# Patient Record
Sex: FEMALE | Race: WHITE | NOT HISPANIC OR LATINO | Employment: FULL TIME | URBAN - METROPOLITAN AREA
[De-identification: names, ages, dates, MRNs, and addresses within clinical notes are randomized per-mention and may not be internally consistent; named-entity substitution may affect disease eponyms.]

---

## 2023-10-03 ENCOUNTER — LAB (OUTPATIENT)
Dept: LAB | Facility: LAB | Age: 38
End: 2023-10-03
Payer: COMMERCIAL

## 2023-10-03 DIAGNOSIS — R63.5 ABNORMAL WEIGHT GAIN: Primary | ICD-10-CM

## 2023-10-03 LAB — TSH SERPL-ACNC: 0.63 MIU/L (ref 0.44–3.98)

## 2023-10-03 PROCEDURE — 36415 COLL VENOUS BLD VENIPUNCTURE: CPT

## 2023-10-12 ENCOUNTER — APPOINTMENT (OUTPATIENT)
Dept: PRIMARY CARE | Facility: CLINIC | Age: 38
End: 2023-10-12
Payer: COMMERCIAL

## 2023-10-19 ENCOUNTER — LAB REQUISITION (OUTPATIENT)
Dept: LAB | Facility: HOSPITAL | Age: 38
End: 2023-10-19
Payer: COMMERCIAL

## 2023-10-19 DIAGNOSIS — N89.8 OTHER SPECIFIED NONINFLAMMATORY DISORDERS OF VAGINA: ICD-10-CM

## 2023-10-19 DIAGNOSIS — R10.2 PELVIC AND PERINEAL PAIN: ICD-10-CM

## 2023-10-19 DIAGNOSIS — N93.0 POSTCOITAL AND CONTACT BLEEDING: ICD-10-CM

## 2023-10-19 PROCEDURE — 88305 TISSUE EXAM BY PATHOLOGIST: CPT | Performed by: PATHOLOGY

## 2023-10-19 PROCEDURE — 88305 TISSUE EXAM BY PATHOLOGIST: CPT

## 2023-10-20 LAB
LABORATORY COMMENT REPORT: NORMAL
PATH REPORT.FINAL DX SPEC: NORMAL
PATH REPORT.GROSS SPEC: NORMAL
PATH REPORT.RELEVANT HX SPEC: NORMAL
PATH REPORT.TOTAL CANCER: NORMAL

## 2024-03-01 ENCOUNTER — OFFICE VISIT (OUTPATIENT)
Dept: PRIMARY CARE | Facility: CLINIC | Age: 39
End: 2024-03-01
Payer: COMMERCIAL

## 2024-03-01 DIAGNOSIS — R41.3 MEMORY CHANGES: ICD-10-CM

## 2024-03-01 DIAGNOSIS — F90.2 ATTENTION DEFICIT HYPERACTIVITY DISORDER (ADHD), COMBINED TYPE: ICD-10-CM

## 2024-03-01 DIAGNOSIS — R53.83 FATIGUE, UNSPECIFIED TYPE: ICD-10-CM

## 2024-03-01 DIAGNOSIS — M25.59 PAIN IN OTHER JOINT: ICD-10-CM

## 2024-03-01 DIAGNOSIS — J30.1 NON-SEASONAL ALLERGIC RHINITIS DUE TO POLLEN: Primary | ICD-10-CM

## 2024-03-01 DIAGNOSIS — E03.9 ACQUIRED HYPOTHYROIDISM: ICD-10-CM

## 2024-03-01 DIAGNOSIS — M25.50 ARTHRALGIA, UNSPECIFIED JOINT: ICD-10-CM

## 2024-03-01 LAB
CRP SERPL-MCNC: 5.1 MG/DL (ref 0–2)
ERYTHROCYTE [SEDIMENTATION RATE] IN BLOOD BY WESTERGREN METHOD: 61 MM/H (ref 0–20)
RHEUMATOID FACT SER NEPH-ACNC: <10 IU/ML (ref 0–15)

## 2024-03-01 PROCEDURE — 86431 RHEUMATOID FACTOR QUANT: CPT | Mod: WESLAB | Performed by: NURSE PRACTITIONER

## 2024-03-01 PROCEDURE — 99214 OFFICE O/P EST MOD 30 MIN: CPT | Performed by: NURSE PRACTITIONER

## 2024-03-01 PROCEDURE — 36415 COLL VENOUS BLD VENIPUNCTURE: CPT | Performed by: NURSE PRACTITIONER

## 2024-03-01 PROCEDURE — 86140 C-REACTIVE PROTEIN: CPT | Performed by: NURSE PRACTITIONER

## 2024-03-01 PROCEDURE — 85652 RBC SED RATE AUTOMATED: CPT | Performed by: NURSE PRACTITIONER

## 2024-03-01 PROCEDURE — 1036F TOBACCO NON-USER: CPT | Performed by: NURSE PRACTITIONER

## 2024-03-01 PROCEDURE — 86038 ANTINUCLEAR ANTIBODIES: CPT | Mod: WESLAB | Performed by: NURSE PRACTITIONER

## 2024-03-01 RX ORDER — BISOPROLOL FUMARATE 5 MG/1
5 TABLET, FILM COATED ORAL DAILY
COMMUNITY
End: 2024-04-30 | Stop reason: SDUPTHER

## 2024-03-01 RX ORDER — OMEPRAZOLE 40 MG/1
40 CAPSULE, DELAYED RELEASE ORAL
COMMUNITY
Start: 2023-07-24 | End: 2024-03-15 | Stop reason: HOSPADM

## 2024-03-01 RX ORDER — LEVOCETIRIZINE DIHYDROCHLORIDE 5 MG/1
5 TABLET, FILM COATED ORAL NIGHTLY
COMMUNITY
Start: 2024-01-02

## 2024-03-01 ASSESSMENT — PATIENT HEALTH QUESTIONNAIRE - PHQ9
SUM OF ALL RESPONSES TO PHQ QUESTIONS 1-9: 14
7. TROUBLE CONCENTRATING ON THINGS, SUCH AS READING THE NEWSPAPER OR WATCHING TELEVISION: MORE THAN HALF THE DAYS
2. FEELING DOWN, DEPRESSED OR HOPELESS: NOT AT ALL
4. FEELING TIRED OR HAVING LITTLE ENERGY: NEARLY EVERY DAY
2. FEELING DOWN, DEPRESSED OR HOPELESS: MORE THAN HALF THE DAYS
9. THOUGHTS THAT YOU WOULD BE BETTER OFF DEAD, OR OF HURTING YOURSELF: NOT AT ALL
6. FEELING BAD ABOUT YOURSELF - OR THAT YOU ARE A FAILURE OR HAVE LET YOURSELF OR YOUR FAMILY DOWN: SEVERAL DAYS
10. IF YOU CHECKED OFF ANY PROBLEMS, HOW DIFFICULT HAVE THESE PROBLEMS MADE IT FOR YOU TO DO YOUR WORK, TAKE CARE OF THINGS AT HOME, OR GET ALONG WITH OTHER PEOPLE: SOMEWHAT DIFFICULT
5. POOR APPETITE OR OVEREATING: SEVERAL DAYS
1. LITTLE INTEREST OR PLEASURE IN DOING THINGS: NOT AT ALL
SUM OF ALL RESPONSES TO PHQ9 QUESTIONS 1 AND 2: 3
3. TROUBLE FALLING OR STAYING ASLEEP OR SLEEPING TOO MUCH: NEARLY EVERY DAY
1. LITTLE INTEREST OR PLEASURE IN DOING THINGS: SEVERAL DAYS
SUM OF ALL RESPONSES TO PHQ9 QUESTIONS 1 AND 2: 0
8. MOVING OR SPEAKING SO SLOWLY THAT OTHER PEOPLE COULD HAVE NOTICED. OR THE OPPOSITE, BEING SO FIGETY OR RESTLESS THAT YOU HAVE BEEN MOVING AROUND A LOT MORE THAN USUAL: SEVERAL DAYS

## 2024-03-01 ASSESSMENT — PAIN SCALES - GENERAL: PAINLEVEL: 0-NO PAIN

## 2024-03-01 NOTE — PROGRESS NOTES
Subjective   Patient ID: Dot Breen is a 38 y.o. female who presents for Annual Exam (Patient here for multi health issues).Having issues body isses and very frustrated, hives, sun allergy painful rash for 5-6 years, throat issues , mucus and hx GERD and gastric bypass, swollowing seen by ENT , Memory and brain fog, trouble finding words and fatigue recent , sleep apnea prior to gastric bypass, concern slow healing after hysterecomy and vaginal healing and bruising   Works as project   HPI tried xyzal and sed   Hx brenda esopogus and 2 EGD   Discussed ADD issues was screening previously     Review of Systems    Objective   There were no vitals taken for this visit.    Physical Exam  Constitutional:       General: She is not in acute distress.     Appearance: Normal appearance.   HENT:      Nose: Nose normal.      Mouth/Throat:      Mouth: Mucous membranes are moist.   Cardiovascular:      Rate and Rhythm: Normal rate and regular rhythm.      Heart sounds: No murmur heard.  Pulmonary:      Breath sounds: Normal breath sounds. No wheezing.   Abdominal:      Palpations: Abdomen is soft.   Musculoskeletal:         General: Normal range of motion.   Neurological:      General: No focal deficit present.      Mental Status: She is alert.         Assessment/Plan

## 2024-03-03 NOTE — RESULT ENCOUNTER NOTE
C Reactive protein and Sed rate elevated for inflammation Waiting on ADELINE results has referral to rheumatology from office visit

## 2024-03-04 LAB — ANA SER QL HEP2 SUBST: NEGATIVE

## 2024-03-12 ENCOUNTER — TELEPHONE (OUTPATIENT)
Dept: SURGERY | Facility: CLINIC | Age: 39
End: 2024-03-12
Payer: COMMERCIAL

## 2024-03-12 DIAGNOSIS — K21.9 GASTROESOPHAGEAL REFLUX DISEASE WITHOUT ESOPHAGITIS: Primary | ICD-10-CM

## 2024-03-12 RX ORDER — OMEPRAZOLE 40 MG/1
40 CAPSULE, DELAYED RELEASE ORAL DAILY
Qty: 90 CAPSULE | Refills: 0 | Status: SHIPPED | OUTPATIENT
Start: 2024-03-12 | End: 2024-03-15 | Stop reason: HOSPADM

## 2024-03-13 ENCOUNTER — APPOINTMENT (OUTPATIENT)
Dept: RADIOLOGY | Facility: HOSPITAL | Age: 39
DRG: 337 | End: 2024-03-13
Payer: COMMERCIAL

## 2024-03-13 ENCOUNTER — HOSPITAL ENCOUNTER (EMERGENCY)
Facility: HOSPITAL | Age: 39
Discharge: HOME | DRG: 337 | End: 2024-03-13
Attending: EMERGENCY MEDICINE
Payer: COMMERCIAL

## 2024-03-13 ENCOUNTER — HOSPITAL ENCOUNTER (INPATIENT)
Facility: HOSPITAL | Age: 39
LOS: 2 days | Discharge: HOME | DRG: 337 | End: 2024-03-15
Attending: EMERGENCY MEDICINE | Admitting: INTERNAL MEDICINE
Payer: COMMERCIAL

## 2024-03-13 VITALS
HEIGHT: 64 IN | SYSTOLIC BLOOD PRESSURE: 125 MMHG | DIASTOLIC BLOOD PRESSURE: 88 MMHG | HEART RATE: 85 BPM | TEMPERATURE: 96.8 F | BODY MASS INDEX: 29.88 KG/M2 | OXYGEN SATURATION: 99 % | RESPIRATION RATE: 12 BRPM | WEIGHT: 175 LBS

## 2024-03-13 DIAGNOSIS — K28.9 JEJUNAL ULCER: Primary | ICD-10-CM

## 2024-03-13 DIAGNOSIS — R10.84 GENERALIZED ABDOMINAL PAIN: ICD-10-CM

## 2024-03-13 DIAGNOSIS — K25.3 ACUTE GASTRIC ULCER WITHOUT HEMORRHAGE OR PERFORATION: ICD-10-CM

## 2024-03-13 DIAGNOSIS — K21.9 GASTROESOPHAGEAL REFLUX DISEASE WITHOUT ESOPHAGITIS: ICD-10-CM

## 2024-03-13 DIAGNOSIS — R10.10 PAIN OF UPPER ABDOMEN: ICD-10-CM

## 2024-03-13 DIAGNOSIS — K92.2 GI BLEED: ICD-10-CM

## 2024-03-13 DIAGNOSIS — K59.00 CONSTIPATION, UNSPECIFIED CONSTIPATION TYPE: Primary | ICD-10-CM

## 2024-03-13 DIAGNOSIS — K66.0 ABDOMINAL ADHESIONS: ICD-10-CM

## 2024-03-13 LAB
ALBUMIN SERPL-MCNC: 3.7 G/DL (ref 3.5–5)
ALP BLD-CCNC: 81 U/L (ref 35–125)
ALT SERPL-CCNC: 14 U/L (ref 5–40)
ANION GAP SERPL CALC-SCNC: 12 MMOL/L
APPEARANCE UR: CLEAR
AST SERPL-CCNC: 12 U/L (ref 5–40)
BACTERIA #/AREA URNS AUTO: ABNORMAL /HPF
BASOPHILS # BLD AUTO: 0.05 X10*3/UL (ref 0–0.1)
BASOPHILS # BLD AUTO: 0.06 X10*3/UL (ref 0–0.1)
BASOPHILS NFR BLD AUTO: 0.4 %
BASOPHILS NFR BLD AUTO: 0.5 %
BILIRUB SERPL-MCNC: <0.2 MG/DL (ref 0.1–1.2)
BILIRUB UR STRIP.AUTO-MCNC: NEGATIVE MG/DL
BUN SERPL-MCNC: 10 MG/DL (ref 8–25)
CALCIUM SERPL-MCNC: 8.9 MG/DL (ref 8.5–10.4)
CHLORIDE SERPL-SCNC: 103 MMOL/L (ref 97–107)
CO2 SERPL-SCNC: 24 MMOL/L (ref 24–31)
COLOR UR: ABNORMAL
CREAT SERPL-MCNC: 0.5 MG/DL (ref 0.4–1.6)
EGFRCR SERPLBLD CKD-EPI 2021: >90 ML/MIN/1.73M*2
EOSINOPHIL # BLD AUTO: 0.14 X10*3/UL (ref 0–0.7)
EOSINOPHIL # BLD AUTO: 0.19 X10*3/UL (ref 0–0.7)
EOSINOPHIL NFR BLD AUTO: 1.1 %
EOSINOPHIL NFR BLD AUTO: 1.7 %
ERYTHROCYTE [DISTWIDTH] IN BLOOD BY AUTOMATED COUNT: 11.6 % (ref 11.5–14.5)
ERYTHROCYTE [DISTWIDTH] IN BLOOD BY AUTOMATED COUNT: 11.7 % (ref 11.5–14.5)
GLUCOSE SERPL-MCNC: 93 MG/DL (ref 65–99)
GLUCOSE UR STRIP.AUTO-MCNC: NORMAL MG/DL
HCT VFR BLD AUTO: 35 % (ref 36–46)
HCT VFR BLD AUTO: 37.7 % (ref 36–46)
HGB BLD-MCNC: 11.2 G/DL (ref 12–16)
HGB BLD-MCNC: 12.3 G/DL (ref 12–16)
IMM GRANULOCYTES # BLD AUTO: 0.04 X10*3/UL (ref 0–0.7)
IMM GRANULOCYTES # BLD AUTO: 0.06 X10*3/UL (ref 0–0.7)
IMM GRANULOCYTES NFR BLD AUTO: 0.4 % (ref 0–0.9)
IMM GRANULOCYTES NFR BLD AUTO: 0.5 % (ref 0–0.9)
KETONES UR STRIP.AUTO-MCNC: NEGATIVE MG/DL
LEUKOCYTE ESTERASE UR QL STRIP.AUTO: NEGATIVE
LYMPHOCYTES # BLD AUTO: 2.51 X10*3/UL (ref 1.2–4.8)
LYMPHOCYTES # BLD AUTO: 2.9 X10*3/UL (ref 1.2–4.8)
LYMPHOCYTES NFR BLD AUTO: 19.5 %
LYMPHOCYTES NFR BLD AUTO: 26.3 %
MCH RBC QN AUTO: 28.4 PG (ref 26–34)
MCH RBC QN AUTO: 28.7 PG (ref 26–34)
MCHC RBC AUTO-ENTMCNC: 32 G/DL (ref 32–36)
MCHC RBC AUTO-ENTMCNC: 32.6 G/DL (ref 32–36)
MCV RBC AUTO: 88 FL (ref 80–100)
MCV RBC AUTO: 89 FL (ref 80–100)
MONOCYTES # BLD AUTO: 0.9 X10*3/UL (ref 0.1–1)
MONOCYTES # BLD AUTO: 0.91 X10*3/UL (ref 0.1–1)
MONOCYTES NFR BLD AUTO: 7.1 %
MONOCYTES NFR BLD AUTO: 8.2 %
MUCOUS THREADS #/AREA URNS AUTO: ABNORMAL /LPF
NEUTROPHILS # BLD AUTO: 6.95 X10*3/UL (ref 1.2–7.7)
NEUTROPHILS # BLD AUTO: 9.23 X10*3/UL (ref 1.2–7.7)
NEUTROPHILS NFR BLD AUTO: 62.9 %
NEUTROPHILS NFR BLD AUTO: 71.4 %
NITRITE UR QL STRIP.AUTO: NEGATIVE
NRBC BLD-RTO: 0 /100 WBCS (ref 0–0)
NRBC BLD-RTO: 0 /100 WBCS (ref 0–0)
PH UR STRIP.AUTO: 6 [PH]
PLATELET # BLD AUTO: 480 X10*3/UL (ref 150–450)
PLATELET # BLD AUTO: 517 X10*3/UL (ref 150–450)
POTASSIUM SERPL-SCNC: 4.1 MMOL/L (ref 3.4–5.1)
PROT SERPL-MCNC: 7.2 G/DL (ref 5.9–7.9)
PROT UR STRIP.AUTO-MCNC: ABNORMAL MG/DL
RBC # BLD AUTO: 3.94 X10*6/UL (ref 4–5.2)
RBC # BLD AUTO: 4.28 X10*6/UL (ref 4–5.2)
RBC # UR STRIP.AUTO: NEGATIVE /UL
RBC #/AREA URNS AUTO: ABNORMAL /HPF
SODIUM SERPL-SCNC: 139 MMOL/L (ref 133–145)
SP GR UR STRIP.AUTO: 1.02
SQUAMOUS #/AREA URNS AUTO: ABNORMAL /HPF
UROBILINOGEN UR STRIP.AUTO-MCNC: NORMAL MG/DL
WBC # BLD AUTO: 11 X10*3/UL (ref 4.4–11.3)
WBC # BLD AUTO: 12.9 X10*3/UL (ref 4.4–11.3)
WBC #/AREA URNS AUTO: ABNORMAL /HPF

## 2024-03-13 PROCEDURE — 99222 1ST HOSP IP/OBS MODERATE 55: CPT | Performed by: SURGERY

## 2024-03-13 PROCEDURE — 85025 COMPLETE CBC W/AUTO DIFF WBC: CPT | Performed by: EMERGENCY MEDICINE

## 2024-03-13 PROCEDURE — 96374 THER/PROPH/DIAG INJ IV PUSH: CPT

## 2024-03-13 PROCEDURE — 2550000001 HC RX 255 CONTRASTS: Performed by: EMERGENCY MEDICINE

## 2024-03-13 PROCEDURE — 81001 URINALYSIS AUTO W/SCOPE: CPT | Performed by: PHYSICIAN ASSISTANT

## 2024-03-13 PROCEDURE — 74177 CT ABD & PELVIS W/CONTRAST: CPT

## 2024-03-13 PROCEDURE — 2500000004 HC RX 250 GENERAL PHARMACY W/ HCPCS (ALT 636 FOR OP/ED): Performed by: PHYSICIAN ASSISTANT

## 2024-03-13 PROCEDURE — 85025 COMPLETE CBC W/AUTO DIFF WBC: CPT | Performed by: PHYSICIAN ASSISTANT

## 2024-03-13 PROCEDURE — 1210000001 HC SEMI-PRIVATE ROOM DAILY

## 2024-03-13 PROCEDURE — 96375 TX/PRO/DX INJ NEW DRUG ADDON: CPT

## 2024-03-13 PROCEDURE — 80053 COMPREHEN METABOLIC PANEL: CPT | Performed by: PHYSICIAN ASSISTANT

## 2024-03-13 PROCEDURE — 99284 EMERGENCY DEPT VISIT MOD MDM: CPT | Mod: 25

## 2024-03-13 PROCEDURE — 99285 EMERGENCY DEPT VISIT HI MDM: CPT | Mod: 25

## 2024-03-13 PROCEDURE — 36415 COLL VENOUS BLD VENIPUNCTURE: CPT | Performed by: EMERGENCY MEDICINE

## 2024-03-13 PROCEDURE — 2500000004 HC RX 250 GENERAL PHARMACY W/ HCPCS (ALT 636 FOR OP/ED): Performed by: EMERGENCY MEDICINE

## 2024-03-13 PROCEDURE — 36415 COLL VENOUS BLD VENIPUNCTURE: CPT | Performed by: PHYSICIAN ASSISTANT

## 2024-03-13 RX ORDER — POLYETHYLENE GLYCOL 3350 17 G/17G
17 POWDER, FOR SOLUTION ORAL 2 TIMES DAILY
Qty: 14 PACKET | Refills: 0 | Status: SHIPPED | OUTPATIENT
Start: 2024-03-13 | End: 2024-03-20

## 2024-03-13 RX ORDER — KETOROLAC TROMETHAMINE 30 MG/ML
15 INJECTION, SOLUTION INTRAMUSCULAR; INTRAVENOUS ONCE
Status: COMPLETED | OUTPATIENT
Start: 2024-03-13 | End: 2024-03-13

## 2024-03-13 RX ORDER — ONDANSETRON HYDROCHLORIDE 2 MG/ML
4 INJECTION, SOLUTION INTRAVENOUS ONCE
Status: COMPLETED | OUTPATIENT
Start: 2024-03-13 | End: 2024-03-13

## 2024-03-13 RX ORDER — MORPHINE SULFATE 4 MG/ML
4 INJECTION, SOLUTION INTRAMUSCULAR; INTRAVENOUS ONCE
Status: COMPLETED | OUTPATIENT
Start: 2024-03-13 | End: 2024-03-13

## 2024-03-13 RX ORDER — OMEPRAZOLE 40 MG/1
40 CAPSULE, DELAYED RELEASE ORAL DAILY
Qty: 30 CAPSULE | Refills: 0 | Status: SHIPPED | OUTPATIENT
Start: 2024-03-13 | End: 2024-03-15 | Stop reason: HOSPADM

## 2024-03-13 RX ADMIN — MORPHINE SULFATE 4 MG: 4 INJECTION, SOLUTION INTRAMUSCULAR; INTRAVENOUS at 22:38

## 2024-03-13 RX ADMIN — SODIUM CHLORIDE 1000 ML: 900 INJECTION, SOLUTION INTRAVENOUS at 13:10

## 2024-03-13 RX ADMIN — SODIUM CHLORIDE 1000 ML: 900 INJECTION, SOLUTION INTRAVENOUS at 22:33

## 2024-03-13 RX ADMIN — KETOROLAC TROMETHAMINE 15 MG: 30 INJECTION, SOLUTION INTRAMUSCULAR at 13:10

## 2024-03-13 RX ADMIN — IOHEXOL 75 ML: 350 INJECTION, SOLUTION INTRAVENOUS at 14:17

## 2024-03-13 RX ADMIN — ONDANSETRON 4 MG: 2 INJECTION INTRAMUSCULAR; INTRAVENOUS at 22:38

## 2024-03-13 ASSESSMENT — LIFESTYLE VARIABLES
HAVE PEOPLE ANNOYED YOU BY CRITICIZING YOUR DRINKING: NO
EVER HAD A DRINK FIRST THING IN THE MORNING TO STEADY YOUR NERVES TO GET RID OF A HANGOVER: NO
EVER FELT BAD OR GUILTY ABOUT YOUR DRINKING: NO
HAVE PEOPLE ANNOYED YOU BY CRITICIZING YOUR DRINKING: NO
EVER HAD A DRINK FIRST THING IN THE MORNING TO STEADY YOUR NERVES TO GET RID OF A HANGOVER: NO
HAVE YOU EVER FELT YOU SHOULD CUT DOWN ON YOUR DRINKING: NO
EVER FELT BAD OR GUILTY ABOUT YOUR DRINKING: NO
HAVE YOU EVER FELT YOU SHOULD CUT DOWN ON YOUR DRINKING: NO

## 2024-03-13 ASSESSMENT — PAIN DESCRIPTION - LOCATION: LOCATION: ABDOMEN

## 2024-03-13 ASSESSMENT — COLUMBIA-SUICIDE SEVERITY RATING SCALE - C-SSRS
1. IN THE PAST MONTH, HAVE YOU WISHED YOU WERE DEAD OR WISHED YOU COULD GO TO SLEEP AND NOT WAKE UP?: NO
6. HAVE YOU EVER DONE ANYTHING, STARTED TO DO ANYTHING, OR PREPARED TO DO ANYTHING TO END YOUR LIFE?: NO
2. HAVE YOU ACTUALLY HAD ANY THOUGHTS OF KILLING YOURSELF?: NO

## 2024-03-13 ASSESSMENT — PAIN DESCRIPTION - DESCRIPTORS: DESCRIPTORS: TEARING

## 2024-03-13 ASSESSMENT — PAIN DESCRIPTION - ONSET: ONSET: SUDDEN

## 2024-03-13 ASSESSMENT — PAIN DESCRIPTION - ORIENTATION: ORIENTATION: MID

## 2024-03-13 ASSESSMENT — PAIN DESCRIPTION - PROGRESSION: CLINICAL_PROGRESSION: NOT CHANGED

## 2024-03-13 ASSESSMENT — PAIN DESCRIPTION - FREQUENCY: FREQUENCY: CONSTANT/CONTINUOUS

## 2024-03-13 ASSESSMENT — PAIN SCALES - GENERAL: PAINLEVEL_OUTOF10: 4

## 2024-03-13 ASSESSMENT — PAIN DESCRIPTION - PAIN TYPE: TYPE: ACUTE PAIN

## 2024-03-13 ASSESSMENT — PAIN - FUNCTIONAL ASSESSMENT: PAIN_FUNCTIONAL_ASSESSMENT: 0-10

## 2024-03-13 NOTE — ED PROVIDER NOTES
HPI   Chief Complaint   Patient presents with    Abdominal Pain     For the past 12 days I have not had a bm I feel bloating and tearing pain globally i feel some nausea but no vomiting       38-year-old female presenting to emergency department with a chief complaint of lower abdominal pain on the left side in addition to not having a bowel movement in the last 11 days.  She is passing gas.  She is not vomiting.  She has prior history of cholecystectomy gastric bypass hysterectomy.  She has been using over-the-counter laxatives without success.  She denies lightheadedness dizziness numbness weakness.  Nontoxic-appearing.  No other complaint.                          Vandana Coma Scale Score: 15                     Patient History   Past Medical History:   Diagnosis Date    Other specified diabetes mellitus without complications (CMS/[facility])     Diabetes mellitus of other type without complication    Personal history of other diseases of the circulatory system     History of hypertension    Personal history of other mental and behavioral disorders 07/18/2017    History of anxiety disorder    Unspecified asthma, uncomplicated 01/04/2017    Asthmatic bronchitis     Past Surgical History:   Procedure Laterality Date    CHOLECYSTECTOMY  2023    HYSTERECTOMY  2023    OTHER SURGICAL HISTORY  06/09/2021    Gastric bypass surgery     Family History   Problem Relation Name Age of Onset    Heart disease Mother      Cancer Father       Social History     Tobacco Use    Smoking status: Never    Smokeless tobacco: Never   Vaping Use    Vaping Use: Never used   Substance Use Topics    Alcohol use: Never    Drug use: Never       Physical Exam   ED Triage Vitals [03/13/24 1246]   Temperature Heart Rate Respirations BP   36.9 °C (98.4 °F) 99 18 (!) 137/98      Pulse Ox Temp Source Heart Rate Source Patient Position   100 % Oral Monitor Sitting      BP Location FiO2 (%)     Right arm --       Physical Exam  Vitals and nursing note  reviewed.   Constitutional:       Appearance: She is well-developed.   HENT:      Head: Normocephalic.      Mouth/Throat:      Mouth: Mucous membranes are moist.   Cardiovascular:      Rate and Rhythm: Normal rate and regular rhythm.   Pulmonary:      Effort: Pulmonary effort is normal.   Abdominal:      General: Abdomen is flat. Bowel sounds are normal.      Palpations: Abdomen is soft.   Skin:     General: Skin is warm.   Neurological:      General: No focal deficit present.      Mental Status: She is alert and oriented to person, place, and time.   Psychiatric:         Mood and Affect: Mood normal.         ED Course & MDM   Diagnoses as of 03/13/24 1645   Constipation, unspecified constipation type   Generalized abdominal pain   Acute gastric ulcer without hemorrhage or perforation       Medical Decision Making  I have seen and evaluated this patient.  The attending physician has also seen and evaluated this patient.  Vital signs, laboratory testing and diagnostic images if applicable have been reviewed.  All laboratory and imaging is interpreted by myself unless otherwise stated.  Radiology studies are also formally interpreted by radiologist.      CBC without significant leukocytosis or anemia, metabolic panel without significant renal impairment or electrolyte abnormality.  CT scan with edema around gastric bypass site.  Surgery was consulted.  Gastric bypass surgeon Dr. Josep Devries evaluates patient at bedside.  Believe she is developing an ulcer recommends initiation of omeprazole and MiraLAX.  Does not believe this represents diverticulitis.  Released in good condition with outpatient follow-up.  Given GI referral    Labs Reviewed   CBC WITH AUTO DIFFERENTIAL - Abnormal       Result Value    WBC 11.0      nRBC 0.0      RBC 4.28      Hemoglobin 12.3      Hematocrit 37.7      MCV 88      MCH 28.7      MCHC 32.6      RDW 11.7      Platelets 517 (*)     Neutrophils % 62.9      Immature Granulocytes %, Automated  0.4      Lymphocytes % 26.3      Monocytes % 8.2      Eosinophils % 1.7      Basophils % 0.5      Neutrophils Absolute 6.95      Immature Granulocytes Absolute, Automated 0.04      Lymphocytes Absolute 2.90      Monocytes Absolute 0.90      Eosinophils Absolute 0.19      Basophils Absolute 0.06     URINALYSIS WITH REFLEX CULTURE AND MICROSCOPIC - Abnormal    Color, Urine Light-Yellow      Appearance, Urine Clear      Specific Gravity, Urine 1.025      pH, Urine 6.0      Protein, Urine 30 (1+) (*)     Glucose, Urine Normal      Blood, Urine NEGATIVE      Ketones, Urine NEGATIVE      Bilirubin, Urine NEGATIVE      Urobilinogen, Urine Normal      Nitrite, Urine NEGATIVE      Leukocyte Esterase, Urine NEGATIVE     URINALYSIS MICROSCOPIC WITH REFLEX CULTURE - Abnormal    WBC, Urine 1-5      RBC, Urine 3-5      Squamous Epithelial Cells, Urine 1-9 (SPARSE)      Bacteria, Urine 1+ (*)     Mucus, Urine 2+     COMPREHENSIVE METABOLIC PANEL - Normal    Glucose 93      Sodium 139      Potassium 4.1      Chloride 103      Bicarbonate 24      Urea Nitrogen 10      Creatinine 0.50      eGFR >90      Calcium 8.9      Albumin 3.7      Alkaline Phosphatase 81      Total Protein 7.2      AST 12      Bilirubin, Total <0.2      ALT 14      Anion Gap 12     URINALYSIS WITH REFLEX CULTURE AND MICROSCOPIC    Narrative:     The following orders were created for panel order Urinalysis with Reflex Culture and Microscopic.  Procedure                               Abnormality         Status                     ---------                               -----------         ------                     Urinalysis with Reflex C...[[id number]]  Abnormal            Final result               Extra Urine Gray Tube[[id number]]                                                         Please view results for these tests on the individual orders.   EXTRA URINE GRAY TUBE     CT abdomen pelvis w IV contrast   Final Result   Postsurgical changes of gastric bypass  are seen. No abnormal bowel   dilatation. There is mild twisting and swirling of the mesentery   adjacent to the gastric bypass in the left upper quadrant with   suggestion of mild inflammatory change however no definitive acute   abnormality is seen. Diverticulosis of the sigmoid colon. Adjacent to   the sigmoid colon is a small fluid collection extending into the   lower pelvis which may reflect mild pelvic ascites however early   abscess can not be entirely excluded. The fluid collection does not   appear to be associated with the diverticulum however acute   diverticulitis can not be excluded.        Signed by: Sedrick Peña 3/13/2024 2:43 PM   Dictation workstation:   [id number]        Medications   ketorolac (Toradol) injection 15 mg (15 mg intravenous Given 3/13/24 1310)   sodium chloride 0.9 % bolus 1,000 mL (0 mL intravenous Stopped 3/13/24 1340)   iohexol (OMNIPaque) 350 mg iodine/mL solution 75 mL (75 mL intravenous Given 3/13/24 1417)     New Prescriptions    OMEPRAZOLE (PRILOSEC) 40 MG DR CAPSULE    Take 1 capsule (40 mg) by mouth once daily. Do not crush or chew.    POLYETHYLENE GLYCOL (GLYCOLAX, MIRALAX) 17 GRAM PACKET    Take 17 g by mouth 2 times a day for 14 doses.       Procedure    Procedures     Yung Ramos PA-C  03/13/24 1640       Yung Ramos PA-C  03/13/24 1651

## 2024-03-13 NOTE — CONSULTS
Reason For Consult  Abdominal pain, abnormal CT scan    History Of Present Illness  Dot Breen is a 38 y.o. female that came to the emergency room today because she has not had a bowel movement in 11 days she had a Heriberto-en-Y gastric bypass in 2021.  She had a cholecystectomy for biliary dyskinesia in 2023.  At that time she had a diagnostic laparoscopy and had no internal hernias or adhesions.  She had a hysterectomy in 2023 and had a nonhealing vaginal cuff requiring reoperation as well as a generalized complaint of difficulty bearing down.  She has a history of Fofana's esophagus and was on a PPI daily.  She tells me she stopped taking her PPI but started developing a postprandial throat clearing and saliva production that is always felt better when she takes her PPI so she resumed it 2 months ago.  She consumes 4 cups of caffeinated fluid per day.  She has a full-time job during the week and works weekends she also has a family with children and is under a lot of stress.  She denies any change in eating habits, meal volume.  She denies fear of eating, nausea or vomiting.  She denies any recent illnesses or exposure.  She tells me she normally has a bowel movement every morning between 7 and 7:30 AM and did not have a bowel movement 11 days ago.  After couple of days she started taking MiraLAX every day.  When this did not result in a bowel movement she gave herself an enema 3 days in a row with only minimal results.  She came to the emergency room today because the instructions on the enemas state that if you do not have a bowel movement after several enemas you should seek medical care.  She had lab work and a CT scan without oral contrast in the ER.  The CT scan was abnormal prompting general surgery consultation.  They recommended consulting her bariatric surgeon.     Past Medical History  She has a past medical history of Other specified diabetes mellitus without complications (CMS/[facility]), Personal history  "of other diseases of the circulatory system, Personal history of other mental and behavioral disorders (07/18/2017), and Unspecified asthma, uncomplicated (01/04/2017).    Surgical History  She has a past surgical history that includes Other surgical history (06/09/2021); Hysterectomy (2023); and Cholecystectomy (2023).     Social History  She reports that she has never smoked. She has never used smokeless tobacco. She reports that she does not drink alcohol and does not use drugs.    Family History  Family History   Problem Relation Name Age of Onset    Heart disease Mother      Cancer Father          Allergies  Vilazodone and Trazodone    Review of Systems  Noncontributory see HPI     Physical Exam      General Examination:         GENERAL APPEARANCE: alert and oriented x 3, Pleasant and cooperative, No Acute Distress.          HEENT: PERRLA.          NECK: normal flexion, normal extension.          CHEST: normal shape and expansion.          ABDOMEN: Tender in upper abdomen, greatest subxiphoid and left upper abdomen no peritonitis, no hernias present, soft, no guarding, no CVA tenderness.          EXTREMITIES: No edema, no ulcerations.          SKIN: normal, no rash, multiple tattoos.          BACK: no CVA tenderness.          AFFECT: normal        Last Recorded Vitals  Blood pressure (!) 137/98, pulse 99, temperature 36.9 °C (98.4 °F), temperature source Oral, resp. rate 18, height 1.626 m (5' 4\"), weight 79.4 kg (175 lb), SpO2 100 %.    Relevant Results  I reviewed her CAT scan with Dr. Kemp.  He does not feel she has diverticulitis.  The inflammatory changes of the Heriberto limb mesentery are nonspecific but abnormal.  He did not appreciate the left lower quadrant fluid collection.      Study Result    Narrative & Impression   Interpreted By:  Sedrick Peña,   STUDY:  CT ABDOMEN PELVIS W IV CONTRAST; 3/13/2024 2:26 pm      INDICATION:  Left lower quadrant pain      COMPARISON:  None      ACCESSION " NUMBER(S):  [id number]      ORDERING CLINICIAN:  ANH PANIAGUA      TECHNIQUE:  Contiguous axial images of the abdomen/pelvis were performed with IV  contrast. 75 ml of Omnipaque 350 was utilized. Coronal and sagittal  reformatted images were also obtained. All CT examinations are  performed with 1 or more of the following dose reduction techniques:  Automated exposure control, adjustment of mA and/or kv according to  patient's size, or use of iterative reconstruction techniques.          FINDINGS:  The liver, common bile duct, pancreas, spleen, and adrenal glands are  unremarkable. Prior cholecystectomy with surgical clips in the  gallbladder fossa.      The kidneys enhance symmetrically. No urolithiasis is seen. No  hydroureteronephrosis is seen.      The visualized aorta is unremarkable.      Postsurgical changes of gastric bypass are seen. No abnormal bowel  dilatation. There is mild twisting and swirling of the mesentery  adjacent to the gastric bypass in the left upper quadrant suggestion  of mild inflammatory change however no definitive acute abnormality  is seen. Diverticulosis of the sigmoid colon. Adjacent to the sigmoid  colon is a small fluid collection extending into the lower pelvis  which may reflect mild pelvic ascites however early abscess can not  be entirely excluded. The fluid collection does not appear to be  associated with the diverticulum however acute diverticulitis can not  be excluded. Otherwise the colon shows no evidence for acute  inflammatory process.      Prior hysterectomy.      The bladder is well distended with no gross wall thickening.      The visualized osseous structures are intact.      Limited images of the lower thorax are unremarkable.      IMPRESSION:  Postsurgical changes of gastric bypass are seen. No abnormal bowel  dilatation. There is mild twisting and swirling of the mesentery  adjacent to the gastric bypass in the left upper quadrant with  suggestion of mild  inflammatory change however no definitive acute  abnormality is seen. Diverticulosis of the sigmoid colon. Adjacent to  the sigmoid colon is a small fluid collection extending into the  lower pelvis which may reflect mild pelvic ascites however early  abscess can not be entirely excluded. The fluid collection does not  appear to be associated with the diverticulum however acute  diverticulitis can not be excluded.      Signed by: Sedrick Peña 3/13/2024 2:43 PM  Dictation workstation:   [id number]        Assessment/Plan     I explained to Dot that I was concerned she had a gastrojejunal ulcer.  I recommended omeprazole 40 mg twice daily with follow-up with me next week.  I explained that she should feel better in 72 hours.  I offered her upper endoscopy to evaluate her gastrojejunostomy.  I explained that her subxiphoid discomfort and CT finding were unrelated to her constipation.    I spent 60 minutes in the professional and overall care of this patient.  10 minute chart review  10 minutes in radiology reviewing CT   25 minutes face to face  5 minutes discussing with ED physician  10 minutes documentation    Ken Kulkarni MD

## 2024-03-14 ENCOUNTER — ANESTHESIA EVENT (OUTPATIENT)
Dept: OPERATING ROOM | Facility: HOSPITAL | Age: 39
DRG: 337 | End: 2024-03-14
Payer: COMMERCIAL

## 2024-03-14 ENCOUNTER — ANESTHESIA (OUTPATIENT)
Dept: OPERATING ROOM | Facility: HOSPITAL | Age: 39
DRG: 337 | End: 2024-03-14
Payer: COMMERCIAL

## 2024-03-14 ENCOUNTER — APPOINTMENT (OUTPATIENT)
Dept: RADIOLOGY | Facility: HOSPITAL | Age: 39
DRG: 337 | End: 2024-03-14
Payer: COMMERCIAL

## 2024-03-14 PROBLEM — R10.10 PAIN OF UPPER ABDOMEN: Status: ACTIVE | Noted: 2024-03-13

## 2024-03-14 LAB
ALBUMIN SERPL-MCNC: 3.6 G/DL (ref 3.5–5)
ANION GAP SERPL CALC-SCNC: 10 MMOL/L
BASOPHILS # BLD AUTO: 0.05 X10*3/UL (ref 0–0.1)
BASOPHILS NFR BLD AUTO: 0.5 %
BUN SERPL-MCNC: 6 MG/DL (ref 8–25)
CALCIUM SERPL-MCNC: 8.6 MG/DL (ref 8.5–10.4)
CHLORIDE SERPL-SCNC: 104 MMOL/L (ref 97–107)
CO2 SERPL-SCNC: 25 MMOL/L (ref 24–31)
CREAT SERPL-MCNC: 0.6 MG/DL (ref 0.4–1.6)
EGFRCR SERPLBLD CKD-EPI 2021: >90 ML/MIN/1.73M*2
EOSINOPHIL # BLD AUTO: 0.12 X10*3/UL (ref 0–0.7)
EOSINOPHIL NFR BLD AUTO: 1.1 %
ERYTHROCYTE [DISTWIDTH] IN BLOOD BY AUTOMATED COUNT: 11.6 % (ref 11.5–14.5)
GLUCOSE SERPL-MCNC: 89 MG/DL (ref 65–99)
HCT VFR BLD AUTO: 33.4 % (ref 36–46)
HCT VFR BLD AUTO: 33.6 % (ref 36–46)
HGB BLD-MCNC: 10.8 G/DL (ref 12–16)
HGB BLD-MCNC: 10.9 G/DL (ref 12–16)
HOLD SPECIMEN: NORMAL
IMM GRANULOCYTES # BLD AUTO: 0.04 X10*3/UL (ref 0–0.7)
IMM GRANULOCYTES NFR BLD AUTO: 0.4 % (ref 0–0.9)
LYMPHOCYTES # BLD AUTO: 2.21 X10*3/UL (ref 1.2–4.8)
LYMPHOCYTES NFR BLD AUTO: 20.8 %
MCH RBC QN AUTO: 28.3 PG (ref 26–34)
MCHC RBC AUTO-ENTMCNC: 32.1 G/DL (ref 32–36)
MCV RBC AUTO: 88 FL (ref 80–100)
MONOCYTES # BLD AUTO: 0.66 X10*3/UL (ref 0.1–1)
MONOCYTES NFR BLD AUTO: 6.2 %
NEUTROPHILS # BLD AUTO: 7.52 X10*3/UL (ref 1.2–7.7)
NEUTROPHILS NFR BLD AUTO: 71 %
NRBC BLD-RTO: 0 /100 WBCS (ref 0–0)
PHOSPHATE SERPL-MCNC: 3.4 MG/DL (ref 2.5–4.5)
PLATELET # BLD AUTO: 408 X10*3/UL (ref 150–450)
POTASSIUM SERPL-SCNC: 3.9 MMOL/L (ref 3.4–5.1)
RBC # BLD AUTO: 3.81 X10*6/UL (ref 4–5.2)
SODIUM SERPL-SCNC: 139 MMOL/L (ref 133–145)
WBC # BLD AUTO: 10.6 X10*3/UL (ref 4.4–11.3)

## 2024-03-14 PROCEDURE — 2500000001 HC RX 250 WO HCPCS SELF ADMINISTERED DRUGS (ALT 637 FOR MEDICARE OP): Performed by: INTERNAL MEDICINE

## 2024-03-14 PROCEDURE — 2550000001 HC RX 255 CONTRASTS: Performed by: INTERNAL MEDICINE

## 2024-03-14 PROCEDURE — 2500000005 HC RX 250 GENERAL PHARMACY W/O HCPCS: Performed by: ANESTHESIOLOGIST ASSISTANT

## 2024-03-14 PROCEDURE — 3600000008 HC OR TIME - EACH INCREMENTAL 1 MINUTE - PROCEDURE LEVEL THREE: Performed by: SURGERY

## 2024-03-14 PROCEDURE — 0DNU4ZZ RELEASE OMENTUM, PERCUTANEOUS ENDOSCOPIC APPROACH: ICD-10-PCS | Performed by: SURGERY

## 2024-03-14 PROCEDURE — 36415 COLL VENOUS BLD VENIPUNCTURE: CPT | Performed by: NURSE PRACTITIONER

## 2024-03-14 PROCEDURE — A44180 PR LAP, SURG ENTEROLYSIS: Performed by: ANESTHESIOLOGY

## 2024-03-14 PROCEDURE — A44180 PR LAP, SURG ENTEROLYSIS: Performed by: ANESTHESIOLOGIST ASSISTANT

## 2024-03-14 PROCEDURE — A9698 NON-RAD CONTRAST MATERIALNOC: HCPCS | Performed by: INTERNAL MEDICINE

## 2024-03-14 PROCEDURE — 99222 1ST HOSP IP/OBS MODERATE 55: CPT | Performed by: SURGERY

## 2024-03-14 PROCEDURE — 2500000005 HC RX 250 GENERAL PHARMACY W/O HCPCS: Performed by: SURGERY

## 2024-03-14 PROCEDURE — 36415 COLL VENOUS BLD VENIPUNCTURE: CPT | Performed by: INTERNAL MEDICINE

## 2024-03-14 PROCEDURE — 74177 CT ABD & PELVIS W/CONTRAST: CPT

## 2024-03-14 PROCEDURE — 2500000004 HC RX 250 GENERAL PHARMACY W/ HCPCS (ALT 636 FOR OP/ED): Performed by: ANESTHESIOLOGY

## 2024-03-14 PROCEDURE — C9113 INJ PANTOPRAZOLE SODIUM, VIA: HCPCS | Performed by: SURGERY

## 2024-03-14 PROCEDURE — 7100000001 HC RECOVERY ROOM TIME - INITIAL BASE CHARGE: Performed by: SURGERY

## 2024-03-14 PROCEDURE — 2720000007 HC OR 272 NO HCPCS: Performed by: SURGERY

## 2024-03-14 PROCEDURE — 3700000002 HC GENERAL ANESTHESIA TIME - EACH INCREMENTAL 1 MINUTE: Performed by: SURGERY

## 2024-03-14 PROCEDURE — 85025 COMPLETE CBC W/AUTO DIFF WBC: CPT | Performed by: INTERNAL MEDICINE

## 2024-03-14 PROCEDURE — 80069 RENAL FUNCTION PANEL: CPT | Performed by: INTERNAL MEDICINE

## 2024-03-14 PROCEDURE — 2500000004 HC RX 250 GENERAL PHARMACY W/ HCPCS (ALT 636 FOR OP/ED): Performed by: SURGERY

## 2024-03-14 PROCEDURE — 2500000004 HC RX 250 GENERAL PHARMACY W/ HCPCS (ALT 636 FOR OP/ED): Performed by: INTERNAL MEDICINE

## 2024-03-14 PROCEDURE — 3600000003 HC OR TIME - INITIAL BASE CHARGE - PROCEDURE LEVEL THREE: Performed by: SURGERY

## 2024-03-14 PROCEDURE — A4217 STERILE WATER/SALINE, 500 ML: HCPCS | Performed by: SURGERY

## 2024-03-14 PROCEDURE — 44180 LAP ENTEROLYSIS: CPT | Performed by: SURGERY

## 2024-03-14 PROCEDURE — C9113 INJ PANTOPRAZOLE SODIUM, VIA: HCPCS | Performed by: INTERNAL MEDICINE

## 2024-03-14 PROCEDURE — 1210000001 HC SEMI-PRIVATE ROOM DAILY

## 2024-03-14 PROCEDURE — 85014 HEMATOCRIT: CPT | Performed by: NURSE PRACTITIONER

## 2024-03-14 PROCEDURE — 3700000001 HC GENERAL ANESTHESIA TIME - INITIAL BASE CHARGE: Performed by: SURGERY

## 2024-03-14 PROCEDURE — 2500000004 HC RX 250 GENERAL PHARMACY W/ HCPCS (ALT 636 FOR OP/ED): Performed by: ANESTHESIOLOGIST ASSISTANT

## 2024-03-14 PROCEDURE — 0DJ08ZZ INSPECTION OF UPPER INTESTINAL TRACT, VIA NATURAL OR ARTIFICIAL OPENING ENDOSCOPIC: ICD-10-PCS | Performed by: SURGERY

## 2024-03-14 PROCEDURE — 7100000002 HC RECOVERY ROOM TIME - EACH INCREMENTAL 1 MINUTE: Performed by: SURGERY

## 2024-03-14 RX ORDER — SODIUM CHLORIDE, SODIUM LACTATE, POTASSIUM CHLORIDE, CALCIUM CHLORIDE 600; 310; 30; 20 MG/100ML; MG/100ML; MG/100ML; MG/100ML
100 INJECTION, SOLUTION INTRAVENOUS CONTINUOUS
Status: DISCONTINUED | OUTPATIENT
Start: 2024-03-14 | End: 2024-03-14

## 2024-03-14 RX ORDER — ONDANSETRON HYDROCHLORIDE 2 MG/ML
4 INJECTION, SOLUTION INTRAVENOUS EVERY 8 HOURS PRN
Status: DISCONTINUED | OUTPATIENT
Start: 2024-03-14 | End: 2024-03-14

## 2024-03-14 RX ORDER — ROCURONIUM BROMIDE 10 MG/ML
INJECTION, SOLUTION INTRAVENOUS AS NEEDED
Status: DISCONTINUED | OUTPATIENT
Start: 2024-03-14 | End: 2024-03-14

## 2024-03-14 RX ORDER — POLYETHYLENE GLYCOL 3350 17 G/17G
17 POWDER, FOR SOLUTION ORAL 2 TIMES DAILY
Status: DISCONTINUED | OUTPATIENT
Start: 2024-03-14 | End: 2024-03-14

## 2024-03-14 RX ORDER — ONDANSETRON HYDROCHLORIDE 2 MG/ML
INJECTION, SOLUTION INTRAVENOUS AS NEEDED
Status: DISCONTINUED | OUTPATIENT
Start: 2024-03-14 | End: 2024-03-14

## 2024-03-14 RX ORDER — MIDAZOLAM HYDROCHLORIDE 1 MG/ML
INJECTION, SOLUTION INTRAMUSCULAR; INTRAVENOUS AS NEEDED
Status: DISCONTINUED | OUTPATIENT
Start: 2024-03-14 | End: 2024-03-14

## 2024-03-14 RX ORDER — LORATADINE 10 MG/1
10 TABLET ORAL DAILY
Status: DISCONTINUED | OUTPATIENT
Start: 2024-03-14 | End: 2024-03-14

## 2024-03-14 RX ORDER — SODIUM CHLORIDE 0.9 G/100ML
IRRIGANT IRRIGATION AS NEEDED
Status: DISCONTINUED | OUTPATIENT
Start: 2024-03-14 | End: 2024-03-14 | Stop reason: HOSPADM

## 2024-03-14 RX ORDER — ACETAMINOPHEN 10 MG/ML
1000 INJECTION, SOLUTION INTRAVENOUS EVERY 6 HOURS
Status: DISCONTINUED | OUTPATIENT
Start: 2024-03-14 | End: 2024-03-15 | Stop reason: HOSPADM

## 2024-03-14 RX ORDER — ONDANSETRON 4 MG/1
4 TABLET, ORALLY DISINTEGRATING ORAL EVERY 8 HOURS PRN
Status: DISCONTINUED | OUTPATIENT
Start: 2024-03-14 | End: 2024-03-14

## 2024-03-14 RX ORDER — BISOPROLOL FUMARATE 5 MG/1
2.5 TABLET, FILM COATED ORAL DAILY
Status: DISCONTINUED | OUTPATIENT
Start: 2024-03-14 | End: 2024-03-14

## 2024-03-14 RX ORDER — LORAZEPAM 2 MG/ML
0.5 INJECTION INTRAMUSCULAR EVERY 6 HOURS PRN
Status: DISCONTINUED | OUTPATIENT
Start: 2024-03-14 | End: 2024-03-15 | Stop reason: HOSPADM

## 2024-03-14 RX ORDER — LIDOCAINE HYDROCHLORIDE 10 MG/ML
0.1 INJECTION INFILTRATION; PERINEURAL ONCE
Status: DISCONTINUED | OUTPATIENT
Start: 2024-03-14 | End: 2024-03-14 | Stop reason: HOSPADM

## 2024-03-14 RX ORDER — MIDAZOLAM HYDROCHLORIDE 1 MG/ML
1 INJECTION, SOLUTION INTRAMUSCULAR; INTRAVENOUS ONCE AS NEEDED
Status: DISCONTINUED | OUTPATIENT
Start: 2024-03-14 | End: 2024-03-14 | Stop reason: HOSPADM

## 2024-03-14 RX ORDER — ACETAMINOPHEN 650 MG/1
650 SUPPOSITORY RECTAL EVERY 4 HOURS PRN
Status: DISCONTINUED | OUTPATIENT
Start: 2024-03-14 | End: 2024-03-14

## 2024-03-14 RX ORDER — NALOXONE HYDROCHLORIDE 0.4 MG/ML
0.2 INJECTION, SOLUTION INTRAMUSCULAR; INTRAVENOUS; SUBCUTANEOUS EVERY 5 MIN PRN
Status: DISCONTINUED | OUTPATIENT
Start: 2024-03-14 | End: 2024-03-15 | Stop reason: HOSPADM

## 2024-03-14 RX ORDER — BUPRENORPHINE HYDROCHLORIDE 0.32 MG/ML
0.1 INJECTION INTRAMUSCULAR; INTRAVENOUS EVERY 6 HOURS PRN
Status: DISCONTINUED | OUTPATIENT
Start: 2024-03-14 | End: 2024-03-15 | Stop reason: HOSPADM

## 2024-03-14 RX ORDER — ACETAMINOPHEN 160 MG/5ML
650 SOLUTION ORAL EVERY 4 HOURS PRN
Status: DISCONTINUED | OUTPATIENT
Start: 2024-03-14 | End: 2024-03-14

## 2024-03-14 RX ORDER — FENTANYL CITRATE 50 UG/ML
50 INJECTION, SOLUTION INTRAMUSCULAR; INTRAVENOUS ONCE
Status: DISCONTINUED | OUTPATIENT
Start: 2024-03-14 | End: 2024-03-14 | Stop reason: HOSPADM

## 2024-03-14 RX ORDER — SUCCINYLCHOLINE CHLORIDE 20 MG/ML
INJECTION INTRAMUSCULAR; INTRAVENOUS AS NEEDED
Status: DISCONTINUED | OUTPATIENT
Start: 2024-03-14 | End: 2024-03-14

## 2024-03-14 RX ORDER — LABETALOL HYDROCHLORIDE 5 MG/ML
INJECTION, SOLUTION INTRAVENOUS AS NEEDED
Status: DISCONTINUED | OUTPATIENT
Start: 2024-03-14 | End: 2024-03-14

## 2024-03-14 RX ORDER — ONDANSETRON HYDROCHLORIDE 2 MG/ML
4 INJECTION, SOLUTION INTRAVENOUS ONCE AS NEEDED
Status: DISCONTINUED | OUTPATIENT
Start: 2024-03-14 | End: 2024-03-14 | Stop reason: HOSPADM

## 2024-03-14 RX ORDER — HYDRALAZINE HYDROCHLORIDE 20 MG/ML
5 INJECTION INTRAMUSCULAR; INTRAVENOUS EVERY 30 MIN PRN
Status: DISCONTINUED | OUTPATIENT
Start: 2024-03-14 | End: 2024-03-14 | Stop reason: HOSPADM

## 2024-03-14 RX ORDER — FENTANYL CITRATE 50 UG/ML
50 INJECTION, SOLUTION INTRAMUSCULAR; INTRAVENOUS EVERY 5 MIN PRN
Status: DISCONTINUED | OUTPATIENT
Start: 2024-03-14 | End: 2024-03-14 | Stop reason: HOSPADM

## 2024-03-14 RX ORDER — SODIUM CHLORIDE, SODIUM LACTATE, POTASSIUM CHLORIDE, CALCIUM CHLORIDE 600; 310; 30; 20 MG/100ML; MG/100ML; MG/100ML; MG/100ML
100 INJECTION, SOLUTION INTRAVENOUS CONTINUOUS
Status: DISCONTINUED | OUTPATIENT
Start: 2024-03-14 | End: 2024-03-14 | Stop reason: HOSPADM

## 2024-03-14 RX ORDER — ONDANSETRON HYDROCHLORIDE 2 MG/ML
4 INJECTION, SOLUTION INTRAVENOUS EVERY 8 HOURS PRN
Status: DISCONTINUED | OUTPATIENT
Start: 2024-03-14 | End: 2024-03-15 | Stop reason: HOSPADM

## 2024-03-14 RX ORDER — HYDRALAZINE HYDROCHLORIDE 20 MG/ML
5 INJECTION INTRAMUSCULAR; INTRAVENOUS EVERY 4 HOURS PRN
Status: DISCONTINUED | OUTPATIENT
Start: 2024-03-14 | End: 2024-03-15 | Stop reason: HOSPADM

## 2024-03-14 RX ORDER — MORPHINE SULFATE 2 MG/ML
2 INJECTION, SOLUTION INTRAMUSCULAR; INTRAVENOUS EVERY 4 HOURS PRN
Status: DISCONTINUED | OUTPATIENT
Start: 2024-03-14 | End: 2024-03-14

## 2024-03-14 RX ORDER — HEPARIN SODIUM 5000 [USP'U]/ML
5000 INJECTION, SOLUTION INTRAVENOUS; SUBCUTANEOUS 2 TIMES DAILY
Status: DISCONTINUED | OUTPATIENT
Start: 2024-03-14 | End: 2024-03-14

## 2024-03-14 RX ORDER — PROCHLORPERAZINE EDISYLATE 5 MG/ML
10 INJECTION INTRAMUSCULAR; INTRAVENOUS EVERY 6 HOURS PRN
Status: DISCONTINUED | OUTPATIENT
Start: 2024-03-14 | End: 2024-03-15 | Stop reason: HOSPADM

## 2024-03-14 RX ORDER — PANTOPRAZOLE SODIUM 40 MG/10ML
40 INJECTION, POWDER, LYOPHILIZED, FOR SOLUTION INTRAVENOUS 2 TIMES DAILY
Status: DISCONTINUED | OUTPATIENT
Start: 2024-03-14 | End: 2024-03-15 | Stop reason: HOSPADM

## 2024-03-14 RX ORDER — PROPOFOL 10 MG/ML
INJECTION, EMULSION INTRAVENOUS AS NEEDED
Status: DISCONTINUED | OUTPATIENT
Start: 2024-03-14 | End: 2024-03-14

## 2024-03-14 RX ORDER — ALBUTEROL SULFATE 0.83 MG/ML
2.5 SOLUTION RESPIRATORY (INHALATION) ONCE AS NEEDED
Status: DISCONTINUED | OUTPATIENT
Start: 2024-03-14 | End: 2024-03-14 | Stop reason: HOSPADM

## 2024-03-14 RX ORDER — ACETAMINOPHEN 325 MG/1
650 TABLET ORAL EVERY 4 HOURS PRN
Status: DISCONTINUED | OUTPATIENT
Start: 2024-03-14 | End: 2024-03-14

## 2024-03-14 RX ORDER — CEFAZOLIN SODIUM 2 G/100ML
INJECTION, SOLUTION INTRAVENOUS AS NEEDED
Status: DISCONTINUED | OUTPATIENT
Start: 2024-03-14 | End: 2024-03-14

## 2024-03-14 RX ORDER — SODIUM CHLORIDE, SODIUM LACTATE, POTASSIUM CHLORIDE, CALCIUM CHLORIDE 600; 310; 30; 20 MG/100ML; MG/100ML; MG/100ML; MG/100ML
125 INJECTION, SOLUTION INTRAVENOUS CONTINUOUS
Status: DISCONTINUED | OUTPATIENT
Start: 2024-03-14 | End: 2024-03-15 | Stop reason: HOSPADM

## 2024-03-14 RX ORDER — FENTANYL CITRATE 50 UG/ML
INJECTION, SOLUTION INTRAMUSCULAR; INTRAVENOUS AS NEEDED
Status: DISCONTINUED | OUTPATIENT
Start: 2024-03-14 | End: 2024-03-14

## 2024-03-14 RX ORDER — PANTOPRAZOLE SODIUM 40 MG/10ML
40 INJECTION, POWDER, LYOPHILIZED, FOR SOLUTION INTRAVENOUS 2 TIMES DAILY
Status: DISCONTINUED | OUTPATIENT
Start: 2024-03-14 | End: 2024-03-14

## 2024-03-14 RX ORDER — LIDOCAINE HYDROCHLORIDE 10 MG/ML
INJECTION, SOLUTION EPIDURAL; INFILTRATION; INTRACAUDAL; PERINEURAL AS NEEDED
Status: DISCONTINUED | OUTPATIENT
Start: 2024-03-14 | End: 2024-03-14

## 2024-03-14 RX ADMIN — ROCURONIUM BROMIDE 10 MG: 10 INJECTION, SOLUTION INTRAVENOUS at 18:25

## 2024-03-14 RX ADMIN — Medication 2 L/MIN: at 21:15

## 2024-03-14 RX ADMIN — ONDANSETRON HYDROCHLORIDE 4 MG: 2 INJECTION INTRAMUSCULAR; INTRAVENOUS at 22:08

## 2024-03-14 RX ADMIN — ROCURONIUM BROMIDE 20 MG: 10 INJECTION, SOLUTION INTRAVENOUS at 17:28

## 2024-03-14 RX ADMIN — SODIUM CHLORIDE, SODIUM LACTATE, POTASSIUM CHLORIDE, AND CALCIUM CHLORIDE: 600; 310; 30; 20 INJECTION, SOLUTION INTRAVENOUS at 18:35

## 2024-03-14 RX ADMIN — PROPOFOL 30 MG: 10 INJECTION, EMULSION INTRAVENOUS at 17:57

## 2024-03-14 RX ADMIN — CEFAZOLIN SODIUM 2 G: 2 INJECTION, SOLUTION INTRAVENOUS at 17:20

## 2024-03-14 RX ADMIN — LABETALOL HYDROCHLORIDE 5 MG: 5 INJECTION, SOLUTION INTRAVENOUS at 17:49

## 2024-03-14 RX ADMIN — SODIUM CHLORIDE, SODIUM LACTATE, POTASSIUM CHLORIDE, AND CALCIUM CHLORIDE 500 ML: 600; 310; 30; 20 INJECTION, SOLUTION INTRAVENOUS at 06:43

## 2024-03-14 RX ADMIN — SODIUM CHLORIDE, SODIUM LACTATE, POTASSIUM CHLORIDE, AND CALCIUM CHLORIDE: 600; 310; 30; 20 INJECTION, SOLUTION INTRAVENOUS at 17:06

## 2024-03-14 RX ADMIN — ACETAMINOPHEN 650 MG: 325 TABLET ORAL at 09:18

## 2024-03-14 RX ADMIN — BISOPROLOL FUMARATE 2.5 MG: 5 TABLET, FILM COATED ORAL at 09:17

## 2024-03-14 RX ADMIN — IOHEXOL 350 ML: 12 SOLUTION ORAL at 10:04

## 2024-03-14 RX ADMIN — PANTOPRAZOLE SODIUM 40 MG: 40 INJECTION, POWDER, FOR SOLUTION INTRAVENOUS at 22:04

## 2024-03-14 RX ADMIN — ROCURONIUM BROMIDE 10 MG: 10 INJECTION, SOLUTION INTRAVENOUS at 18:00

## 2024-03-14 RX ADMIN — MEPERIDINE HYDROCHLORIDE 12.5 MG: 25 INJECTION, SOLUTION INTRAMUSCULAR; INTRAVENOUS; SUBCUTANEOUS at 19:05

## 2024-03-14 RX ADMIN — SUGAMMADEX 200 MG: 100 INJECTION, SOLUTION INTRAVENOUS at 18:54

## 2024-03-14 RX ADMIN — DEXAMETHASONE SODIUM PHOSPHATE 4 MG: 4 INJECTION, SOLUTION INTRA-ARTICULAR; INTRALESIONAL; INTRAMUSCULAR; INTRAVENOUS; SOFT TISSUE at 17:30

## 2024-03-14 RX ADMIN — SUCCINYLCHOLINE CHLORIDE 140 MG: 20 INJECTION, SOLUTION INTRAMUSCULAR; INTRAVENOUS at 17:14

## 2024-03-14 RX ADMIN — LORATADINE 10 MG: 10 TABLET ORAL at 09:17

## 2024-03-14 RX ADMIN — PROPOFOL 20 MG: 10 INJECTION, EMULSION INTRAVENOUS at 17:49

## 2024-03-14 RX ADMIN — LABETALOL HYDROCHLORIDE 5 MG: 5 INJECTION, SOLUTION INTRAVENOUS at 17:40

## 2024-03-14 RX ADMIN — IOHEXOL 75 ML: 350 INJECTION, SOLUTION INTRAVENOUS at 10:04

## 2024-03-14 RX ADMIN — FENTANYL CITRATE 50 MCG: 50 INJECTION, SOLUTION INTRAMUSCULAR; INTRAVENOUS at 17:14

## 2024-03-14 RX ADMIN — PROPOFOL 150 MG: 10 INJECTION, EMULSION INTRAVENOUS at 17:14

## 2024-03-14 RX ADMIN — ACETAMINOPHEN 1000 MG: 10 INJECTION INTRAVENOUS at 22:01

## 2024-03-14 RX ADMIN — BUPRENORPHINE HYDROCHLORIDE 0.1 MG: 0.32 INJECTION INTRAMUSCULAR; INTRAVENOUS at 23:13

## 2024-03-14 RX ADMIN — HYDROMORPHONE HYDROCHLORIDE 0.4 MG: 1 INJECTION, SOLUTION INTRAMUSCULAR; INTRAVENOUS; SUBCUTANEOUS at 19:13

## 2024-03-14 RX ADMIN — MIDAZOLAM 2 MG: 1 INJECTION INTRAMUSCULAR; INTRAVENOUS at 17:06

## 2024-03-14 RX ADMIN — PANTOPRAZOLE SODIUM 40 MG: 40 INJECTION, POWDER, FOR SOLUTION INTRAVENOUS at 09:17

## 2024-03-14 RX ADMIN — LORAZEPAM 0.5 MG: 2 INJECTION INTRAMUSCULAR; INTRAVENOUS at 22:32

## 2024-03-14 RX ADMIN — FENTANYL CITRATE 50 MCG: 50 INJECTION, SOLUTION INTRAMUSCULAR; INTRAVENOUS at 17:57

## 2024-03-14 RX ADMIN — POLYETHYLENE GLYCOL 3350 17 G: 17 POWDER, FOR SOLUTION ORAL at 11:12

## 2024-03-14 RX ADMIN — FENTANYL CITRATE 50 MCG: 50 INJECTION, SOLUTION INTRAMUSCULAR; INTRAVENOUS at 18:14

## 2024-03-14 RX ADMIN — ONDANSETRON HYDROCHLORIDE 4 MG: 2 INJECTION INTRAMUSCULAR; INTRAVENOUS at 18:53

## 2024-03-14 RX ADMIN — FENTANYL CITRATE 50 MCG: 50 INJECTION, SOLUTION INTRAMUSCULAR; INTRAVENOUS at 17:35

## 2024-03-14 RX ADMIN — MORPHINE SULFATE 2 MG: 2 INJECTION, SOLUTION INTRAMUSCULAR; INTRAVENOUS at 05:10

## 2024-03-14 RX ADMIN — FENTANYL CITRATE 50 MCG: 50 INJECTION INTRAMUSCULAR; INTRAVENOUS at 16:26

## 2024-03-14 RX ADMIN — PIPERACILLIN SODIUM AND TAZOBACTAM SODIUM 4.5 G: 4; .5 INJECTION, SOLUTION INTRAVENOUS at 23:08

## 2024-03-14 RX ADMIN — HEPARIN SODIUM 5000 UNITS: 5000 INJECTION, SOLUTION INTRAVENOUS; SUBCUTANEOUS at 13:24

## 2024-03-14 RX ADMIN — SODIUM CHLORIDE, SODIUM LACTATE, POTASSIUM CHLORIDE, AND CALCIUM CHLORIDE 100 ML/HR: 600; 310; 30; 20 INJECTION, SOLUTION INTRAVENOUS at 09:16

## 2024-03-14 RX ADMIN — SODIUM CHLORIDE, SODIUM LACTATE, POTASSIUM CHLORIDE, AND CALCIUM CHLORIDE 125 ML/HR: 600; 310; 30; 20 INJECTION, SOLUTION INTRAVENOUS at 21:29

## 2024-03-14 RX ADMIN — LIDOCAINE HYDROCHLORIDE 50 MG: 10 INJECTION, SOLUTION EPIDURAL; INFILTRATION; INTRACAUDAL; PERINEURAL at 17:14

## 2024-03-14 RX ADMIN — PANTOPRAZOLE SODIUM 40 MG: 40 INJECTION, POWDER, FOR SOLUTION INTRAVENOUS at 01:03

## 2024-03-14 SDOH — SOCIAL STABILITY: SOCIAL INSECURITY: DO YOU FEEL ANYONE HAS EXPLOITED OR TAKEN ADVANTAGE OF YOU FINANCIALLY OR OF YOUR PERSONAL PROPERTY?: NO

## 2024-03-14 SDOH — SOCIAL STABILITY: SOCIAL INSECURITY: HAVE YOU HAD THOUGHTS OF HARMING ANYONE ELSE?: NO

## 2024-03-14 SDOH — SOCIAL STABILITY: SOCIAL INSECURITY: DO YOU FEEL UNSAFE GOING BACK TO THE PLACE WHERE YOU ARE LIVING?: NO

## 2024-03-14 SDOH — SOCIAL STABILITY: SOCIAL INSECURITY: ARE THERE ANY APPARENT SIGNS OF INJURIES/BEHAVIORS THAT COULD BE RELATED TO ABUSE/NEGLECT?: NO

## 2024-03-14 SDOH — SOCIAL STABILITY: SOCIAL INSECURITY: ABUSE: ADULT

## 2024-03-14 SDOH — SOCIAL STABILITY: SOCIAL INSECURITY: DOES ANYONE TRY TO KEEP YOU FROM HAVING/CONTACTING OTHER FRIENDS OR DOING THINGS OUTSIDE YOUR HOME?: NO

## 2024-03-14 SDOH — SOCIAL STABILITY: SOCIAL INSECURITY: HAS ANYONE EVER THREATENED TO HURT YOUR FAMILY OR YOUR PETS?: NO

## 2024-03-14 SDOH — HEALTH STABILITY: MENTAL HEALTH: CURRENT SMOKER: 0

## 2024-03-14 SDOH — SOCIAL STABILITY: SOCIAL INSECURITY: ARE YOU OR HAVE YOU BEEN THREATENED OR ABUSED PHYSICALLY, EMOTIONALLY, OR SEXUALLY BY ANYONE?: NO

## 2024-03-14 SDOH — SOCIAL STABILITY: SOCIAL INSECURITY: WERE YOU ABLE TO COMPLETE ALL THE BEHAVIORAL HEALTH SCREENINGS?: YES

## 2024-03-14 ASSESSMENT — PAIN - FUNCTIONAL ASSESSMENT
PAIN_FUNCTIONAL_ASSESSMENT: 0-10
PAIN_FUNCTIONAL_ASSESSMENT: FLACC (FACE, LEGS, ACTIVITY, CRY, CONSOLABILITY)
PAIN_FUNCTIONAL_ASSESSMENT: 0-10
PAIN_FUNCTIONAL_ASSESSMENT: FLACC (FACE, LEGS, ACTIVITY, CRY, CONSOLABILITY)
PAIN_FUNCTIONAL_ASSESSMENT: 0-10
PAIN_FUNCTIONAL_ASSESSMENT: FLACC (FACE, LEGS, ACTIVITY, CRY, CONSOLABILITY)
PAIN_FUNCTIONAL_ASSESSMENT: FLACC (FACE, LEGS, ACTIVITY, CRY, CONSOLABILITY)
PAIN_FUNCTIONAL_ASSESSMENT: 0-10
PAIN_FUNCTIONAL_ASSESSMENT: 0-10

## 2024-03-14 ASSESSMENT — PATIENT HEALTH QUESTIONNAIRE - PHQ9
2. FEELING DOWN, DEPRESSED OR HOPELESS: NOT AT ALL
SUM OF ALL RESPONSES TO PHQ9 QUESTIONS 1 & 2: 0
1. LITTLE INTEREST OR PLEASURE IN DOING THINGS: NOT AT ALL

## 2024-03-14 ASSESSMENT — COGNITIVE AND FUNCTIONAL STATUS - GENERAL
MOBILITY SCORE: 24
DAILY ACTIVITIY SCORE: 24
PATIENT BASELINE BEDBOUND: NO
DAILY ACTIVITIY SCORE: 24
DAILY ACTIVITIY SCORE: 24

## 2024-03-14 ASSESSMENT — PAIN SCALES - GENERAL
PAINLEVEL_OUTOF10: 6
PAINLEVEL_OUTOF10: 4
PAINLEVEL_OUTOF10: 8
PAINLEVEL_OUTOF10: 5 - MODERATE PAIN
PAINLEVEL_OUTOF10: 5 - MODERATE PAIN
PAINLEVEL_OUTOF10: 3
PAINLEVEL_OUTOF10: 5 - MODERATE PAIN
PAINLEVEL_OUTOF10: 2
PAINLEVEL_OUTOF10: 5 - MODERATE PAIN
PAINLEVEL_OUTOF10: 2
PAINLEVEL_OUTOF10: 8
PAINLEVEL_OUTOF10: 0 - NO PAIN
PAINLEVEL_OUTOF10: 5 - MODERATE PAIN
PAINLEVEL_OUTOF10: 4
PAINLEVEL_OUTOF10: 5 - MODERATE PAIN
PAIN_LEVEL: 2
PAINLEVEL_OUTOF10: 5 - MODERATE PAIN
PAINLEVEL_OUTOF10: 6
PAINLEVEL_OUTOF10: 8
PAINLEVEL_OUTOF10: 2

## 2024-03-14 ASSESSMENT — PAIN DESCRIPTION - LOCATION
LOCATION: HEAD
LOCATION: ABDOMEN

## 2024-03-14 ASSESSMENT — LIFESTYLE VARIABLES
HOW MANY STANDARD DRINKS CONTAINING ALCOHOL DO YOU HAVE ON A TYPICAL DAY: PATIENT DOES NOT DRINK
HOW OFTEN DO YOU HAVE 6 OR MORE DRINKS ON ONE OCCASION: NEVER
AUDIT-C TOTAL SCORE: 0
AUDIT-C TOTAL SCORE: 0
SKIP TO QUESTIONS 9-10: 1
HOW OFTEN DO YOU HAVE A DRINK CONTAINING ALCOHOL: NEVER

## 2024-03-14 ASSESSMENT — ACTIVITIES OF DAILY LIVING (ADL)
LACK_OF_TRANSPORTATION: NO
BATHING: INDEPENDENT
TOILETING: INDEPENDENT
DRESSING YOURSELF: INDEPENDENT
FEEDING YOURSELF: INDEPENDENT
WALKS IN HOME: INDEPENDENT
JUDGMENT_ADEQUATE_SAFELY_COMPLETE_DAILY_ACTIVITIES: YES
ADEQUATE_TO_COMPLETE_ADL: YES
HEARING - RIGHT EAR: FUNCTIONAL
PATIENT'S MEMORY ADEQUATE TO SAFELY COMPLETE DAILY ACTIVITIES?: YES
HEARING - LEFT EAR: FUNCTIONAL
GROOMING: INDEPENDENT

## 2024-03-14 ASSESSMENT — PAIN DESCRIPTION - ONSET: ONSET: SUDDEN

## 2024-03-14 ASSESSMENT — PAIN DESCRIPTION - PROGRESSION: CLINICAL_PROGRESSION: GRADUALLY IMPROVING

## 2024-03-14 ASSESSMENT — PAIN DESCRIPTION - PAIN TYPE: TYPE: ACUTE PAIN

## 2024-03-14 ASSESSMENT — PAIN DESCRIPTION - ORIENTATION
ORIENTATION: MID
ORIENTATION: LEFT
ORIENTATION: RIGHT;LEFT;LOWER;MID

## 2024-03-14 ASSESSMENT — PAIN DESCRIPTION - DESCRIPTORS
DESCRIPTORS: OTHER (COMMENT)
DESCRIPTORS: SHARP
DESCRIPTORS: ACHING
DESCRIPTORS: ACHING;SORE

## 2024-03-14 ASSESSMENT — COLUMBIA-SUICIDE SEVERITY RATING SCALE - C-SSRS
2. HAVE YOU ACTUALLY HAD ANY THOUGHTS OF KILLING YOURSELF?: NO
6. HAVE YOU EVER DONE ANYTHING, STARTED TO DO ANYTHING, OR PREPARED TO DO ANYTHING TO END YOUR LIFE?: NO
1. IN THE PAST MONTH, HAVE YOU WISHED YOU WERE DEAD OR WISHED YOU COULD GO TO SLEEP AND NOT WAKE UP?: NO

## 2024-03-14 ASSESSMENT — PAIN DESCRIPTION - FREQUENCY: FREQUENCY: CONSTANT/CONTINUOUS

## 2024-03-14 NOTE — H&P
History Of Present Illness  Dot Breen is a 38 y.o. female presenting with constipation and maroon stool.  Patient has a history of a Heriberto-en-Y bypass 2021 with Dr. Kulkarni.  She states that she has not had a bowel movement in the last 11 days despite using MiraLAX every day, docusate, and enema.  She presented to the emergency department today, CT showed evidence of a possible gastrojejunal ulcer, and she was seen by bariatrics surgery, and was recommended to be discharged on twice a day PPI with close follow-up.  In the interim, she had another enema to try to get her constipation moving, and she felt a burning sensation, and noticed that her stool was all maroon blood and that she had a blood clot.  She now has left upper and lower quadrant abdominal pain, and she came back to the emergency department..     Past Medical History  She has a past medical history of Other specified diabetes mellitus without complications (CMS/[facility]), Personal history of other diseases of the circulatory system, Personal history of other mental and behavioral disorders (07/18/2017), and Unspecified asthma, uncomplicated (01/04/2017).    Surgical History  She has a past surgical history that includes Other surgical history (06/09/2021); Hysterectomy (2023); and Cholecystectomy (2023).     Social History  She reports that she has never smoked. She has never used smokeless tobacco. She reports that she does not drink alcohol and does not use drugs.    Family History  Family History   Problem Relation Name Age of Onset    Heart disease Mother      Cancer Father          Allergies  Vilazodone and Trazodone    Review of Systems   All other systems reviewed and are negative.       Physical Exam  HENT:      Right Ear: External ear normal.      Left Ear: External ear normal.   Eyes:      Extraocular Movements: Extraocular movements intact.   Cardiovascular:      Rate and Rhythm: Normal rate.      Heart sounds: Normal heart sounds.    Pulmonary:      Effort: Pulmonary effort is normal.      Breath sounds: Normal breath sounds.   Abdominal:      General: Bowel sounds are normal. There is no distension.      Palpations: Abdomen is soft. There is no mass.      Tenderness: There is no guarding.   Skin:     General: Skin is warm.   Neurological:      General: No focal deficit present.      Mental Status: She is alert.   Psychiatric:         Mood and Affect: Mood normal.          Last Recorded Vitals  /86 (BP Location: Left arm, Patient Position: Sitting)   Pulse (!) 116   Temp 36 °C (96.8 °F) (Temporal)   Resp 18   Wt 79.4 kg (175 lb)   SpO2 100%     Relevant Results        Results for orders placed or performed during the hospital encounter of 03/13/24 (from the past 24 hour(s))   CBC and Auto Differential   Result Value Ref Range    WBC 12.9 (H) 4.4 - 11.3 x10*3/uL    nRBC 0.0 0.0 - 0.0 /100 WBCs    RBC 3.94 (L) 4.00 - 5.20 x10*6/uL    Hemoglobin 11.2 (L) 12.0 - 16.0 g/dL    Hematocrit 35.0 (L) 36.0 - 46.0 %    MCV 89 80 - 100 fL    MCH 28.4 26.0 - 34.0 pg    MCHC 32.0 32.0 - 36.0 g/dL    RDW 11.6 11.5 - 14.5 %    Platelets 480 (H) 150 - 450 x10*3/uL    Neutrophils % 71.4 40.0 - 80.0 %    Immature Granulocytes %, Automated 0.5 0.0 - 0.9 %    Lymphocytes % 19.5 13.0 - 44.0 %    Monocytes % 7.1 2.0 - 10.0 %    Eosinophils % 1.1 0.0 - 6.0 %    Basophils % 0.4 0.0 - 2.0 %    Neutrophils Absolute 9.23 (H) 1.20 - 7.70 x10*3/uL    Immature Granulocytes Absolute, Automated 0.06 0.00 - 0.70 x10*3/uL    Lymphocytes Absolute 2.51 1.20 - 4.80 x10*3/uL    Monocytes Absolute 0.91 0.10 - 1.00 x10*3/uL    Eosinophils Absolute 0.14 0.00 - 0.70 x10*3/uL    Basophils Absolute 0.05 0.00 - 0.10 x10*3/uL         Assessment/Plan   Principal Problem:    GI bleed    GI bleed  -Suspected of per with CT findings, Heriberto-en-Y bypass, and maroon stools  -Some downtrend in hemoglobin, though no need for transfusion at this point  -Will start Protonix 40 mg IV twice a  day  -I have reviewed Dr. Kulkarni's previous note, and will consult him for possible EGD.    Constipation  -Abdominal exam is benign  -Will schedule MiraLAX twice a day, and I have ordered a soapsuds enema though the patient is somewhat hesitant tonight with her maroon stool    History of Heriberto-en-Y  -As above.             Glynn Pal, DO

## 2024-03-14 NOTE — ANESTHESIA POSTPROCEDURE EVALUATION
Patient: Dot Breen    Procedure Summary       Date: 03/14/24 Room / Location: [facility] OR 11 / Virtual [facility] OR    Anesthesia Start: 1706 Anesthesia Stop: 1906    Procedures:       Laparoscopic Lysis of Adhesions. (Abdomen)      Esophagogastroduodenoscopy (Esophagus) Diagnosis:       Pain of upper abdomen      Jejunal ulcer      Abdominal adhesions      (Pain of upper abdomen [R10.10])    Surgeons: Ken Kulkarni MD Responsible Provider: Jarrell Gamino MD    Anesthesia Type: general ASA Status: 2            Anesthesia Type: general    Vitals Value Taken Time   /70 03/14/24 1908   Temp 37 03/14/24 1908   Pulse 110 03/14/24 1908   Resp 16 03/14/24 1908   SpO2 100 03/14/24 1908       Anesthesia Post Evaluation    Patient location during evaluation: PACU  Patient participation: complete - patient participated  Level of consciousness: sleepy but conscious  Pain score: 2  Pain management: adequate  Multimodal analgesia pain management approach  Airway patency: patent  Cardiovascular status: acceptable  Respiratory status: acceptable  Hydration status: acceptable  Postoperative Nausea and Vomiting: none        There were no known notable events for this encounter.

## 2024-03-14 NOTE — PROGRESS NOTES
"Dot Breen is a 38 y.o. female on day 1 of admission presenting with GI bleed.    Subjective   Says feels very tired secondary to she did not sleep last night-other than feeling very tired due to not sleeping last night she feels the same as yesterday.  Abdominal pain is left upper quadrant, left lower quadrant, and epigastric that she says is the same as yesterday and currently rates a 4.  No nausea or vomiting.  Passing gas.  No further bowel movements since admitted.  No rectal bleeding in between her bowel movements.  No angina or shortness of breath.  When asked if feels dizzy or lightheaded she says a little bit but she thinks that that is because she is so tired because she did not sleep at all last night.  No chills.  Says that Dr Kulkarni already saw her this morning. She has not taken the ordered MiraLAX twice daily yet since she has been here.  She did not do the soapsuds enema ordered for yesterday because she was and is scared and too afraid to.     Objective     Vital signs in last 24 hours:  Temp:  [36 °C (96.8 °F)-37.3 °C (99.1 °F)] 37 °C (98.6 °F)  Heart Rate:  [] 85  Resp:  [12-18] 18  BP: (125-175)/(73-98) 141/73  Heart Rate:  []   Temp:  [36 °C (96.8 °F)-37.3 °C (99.1 °F)]   Resp:  [12-18]   BP: (125-175)/(73-98)   Height:  [162.6 cm (5' 4\")]   Weight:  [79.4 kg (175 lb)]   SpO2:  [98 %-100 %]      Intake/Output last 3 Shifts:  I/O last 3 completed shifts:  In: 1000 (12.6 mL/kg) [IV Piggyback:1000]  Out: - (0 mL/kg)   Weight: 79.4 kg       Physical Exam  No acute distress  Not septic appearing  Looks fine and comfortable  Alert and oriented  Heart regular  Lungs clear  No edema  Abdomen soft, positive bowel sounds, nondistended, tenderness:  Left lower 5-6  Left upper 5-6  Epigastric 5-6  Periumbilical 0  Suprapubic 0  Right lower 0  Right upper 0  No guarding/rebound  No obvious hernias  It does not cause the patient any abdominal pain when I shake her bed a little " bit    Significant other at bedside      Scheduled medications  bisoprolol, 2.5 mg, oral, Daily  loratadine, 10 mg, oral, Daily  pantoprazole, 40 mg, intravenous, BID  polyethylene glycol, 17 g, oral, BID      Continuous medications  lactated Ringer's, 100 mL/hr, Last Rate: 100 mL/hr (03/14/24 0916)      PRN medications  PRN medications: acetaminophen **OR** acetaminophen **OR** acetaminophen, morphine, ondansetron ODT **OR** ondansetron    Relevant Results  Results from last 7 days   Lab Units 03/14/24  0533 03/13/24  2233 03/13/24  1249   WBC AUTO x10*3/uL 10.6 12.9* 11.0   HEMOGLOBIN g/dL 10.8* 11.2* 12.3   HEMATOCRIT % 33.6* 35.0* 37.7   PLATELETS AUTO x10*3/uL 408 480* 517*      Results from last 7 days   Lab Units 03/14/24  0533 03/13/24  1249   SODIUM mmol/L 139 139   POTASSIUM mmol/L 3.9 4.1   CHLORIDE mmol/L 104 103   CO2 mmol/L 25 24   BUN mg/dL 6* 10   CREATININE mg/dL 0.60 0.50   GLUCOSE mg/dL 89 93   CALCIUM mg/dL 8.6 8.9       CT abdomen pelvis w IV contrast    Result Date: 3/13/2024  Interpreted By:  Sedrick Peña, STUDY: CT ABDOMEN PELVIS W IV CONTRAST; 3/13/2024 2:26 pm   INDICATION: Left lower quadrant pain   COMPARISON: None   ACCESSION NUMBER(S): [id number]   ORDERING CLINICIAN: ANH PANIAGUA   TECHNIQUE: Contiguous axial images of the abdomen/pelvis were performed with IV contrast. 75 ml of Omnipaque 350 was utilized. Coronal and sagittal reformatted images were also obtained. All CT examinations are performed with 1 or more of the following dose reduction techniques: Automated exposure control, adjustment of mA and/or kv according to patient's size, or use of iterative reconstruction techniques.     FINDINGS: The liver, common bile duct, pancreas, spleen, and adrenal glands are unremarkable. Prior cholecystectomy with surgical clips in the gallbladder fossa.   The kidneys enhance symmetrically. No urolithiasis is seen. No hydroureteronephrosis is seen.   The visualized aorta is  unremarkable.   Postsurgical changes of gastric bypass are seen. No abnormal bowel dilatation. There is mild twisting and swirling of the mesentery adjacent to the gastric bypass in the left upper quadrant suggestion of mild inflammatory change however no definitive acute abnormality is seen. Diverticulosis of the sigmoid colon. Adjacent to the sigmoid colon is a small fluid collection extending into the lower pelvis which may reflect mild pelvic ascites however early abscess can not be entirely excluded. The fluid collection does not appear to be associated with the diverticulum however acute diverticulitis can not be excluded. Otherwise the colon shows no evidence for acute inflammatory process.   Prior hysterectomy.   The bladder is well distended with no gross wall thickening.   The visualized osseous structures are intact.   Limited images of the lower thorax are unremarkable.       Postsurgical changes of gastric bypass are seen. No abnormal bowel dilatation. There is mild twisting and swirling of the mesentery adjacent to the gastric bypass in the left upper quadrant with suggestion of mild inflammatory change however no definitive acute abnormality is seen. Diverticulosis of the sigmoid colon. Adjacent to the sigmoid colon is a small fluid collection extending into the lower pelvis which may reflect mild pelvic ascites however early abscess can not be entirely excluded. The fluid collection does not appear to be associated with the diverticulum however acute diverticulitis can not be excluded.   Signed by: Sedrick Peña 3/13/2024 2:43 PM Dictation workstation:   [id number]      Assessment/Plan   Principal Problem:    GI bleed  History of Heriberto-en-Y gastric bypass in 2021  3/13 CT as above  Concern for gastrojejunal ulcer  VSS  3/13 at 1249 H&H 12.3 and 37.7  3/13 at 2233 H&H 11.2 and 35  3/14 at 0533 H&H 10.8 and 33.6, BUN 6 from 10  N.p.o.  Has LR at 100  On Protonix 40 mg IV twice daily  Repeat H&H 6  hours after today's labs were drawn-drawn at 0533-repeat H&H at 1130 today  Await results of today's CT       Florencia Villar, APRN-CNP

## 2024-03-14 NOTE — CONSULTS
Reason For Consult  GI bleed, abdominal pain    History Of Present Illness  Dot Breen is a 38 y.o. female that was seen in the emergency room yesterday afternoon for complaint of no bowel movement in 11 days without result despite taking miralax daily and giving herself three enemas.  She had a CT scan that was read as diverticulitis.  I was asked to see her because of history of Radha-en-Y gastric bypass 3 years ago.  I reviewed CT and her inflammation appeared to be in the mesentery of the radha limb.  She did not appear acutely ill.  She has a history of dysphagia, stopping her ppi, consuming 4 cups of caffeine daily and being under a lot of stress. I recommended bid ppi and follow up in one week.  She agreed to the plan and was discharged.  She tells me she went home and gave herself a fourth enema which resulted in a large volume bloody bowel movement.  This made her anxious so she returned to the emergency room.  She was admitted and had a CT scan this AM, this time with oral contrast. I reviewed today's CT with Dr. Ramires.  The inflammatory changes in the mesentery did not change appearance.  There was no evidence on either today's or yesterday's CT of sma or celiac thrombosis.  Dot is tired today from being up all night but has no nausea.  She has not had any bowel movements since coming to the emergency room.     Past Medical History  She has a past medical history of Other specified diabetes mellitus without complications (CMS/[facility]), Personal history of other diseases of the circulatory system, Personal history of other mental and behavioral disorders (07/18/2017), and Unspecified asthma, uncomplicated (01/04/2017).    Surgical History  She has a past surgical history that includes Other surgical history (06/09/2021); Hysterectomy (2023); and Cholecystectomy (2023).     Social History  She reports that she has never smoked. She has never used smokeless tobacco. She reports that she does not drink alcohol  "and does not use drugs.    Family History  Family History   Problem Relation Name Age of Onset    Heart disease Mother      Cancer Father          Allergies  Vilazodone and Trazodone    Review of Systems  See HPI     Physical Exam      General Examination:         GENERAL APPEARANCE: alert and oriented x 3, Pleasant and cooperative, No Acute Distress.          HEENT: PERRLA.          NECK: no lymphadenopathy.          HEART: regular rate and rhythm.          LUNGS: clear to auscultation bilaterally.          CHEST: normal shape and expansion.          ABDOMEN: tender subxiphoid and left side, no hernias present, soft, no guarding, no CVA tenderness.          EXTREMITIES: pulses 2 plus bilaterally, trace bilateral edema, no ulcerations.          SKIN: normal, tattoos.           BACK: no CVA tenderness.          Last Recorded Vitals  Blood pressure 141/73, pulse 85, temperature 37 °C (98.6 °F), temperature source Oral, resp. rate 18, height 1.626 m (5' 4\"), weight 79.4 kg (175 lb), SpO2 98 %.    Relevant Results   Latest Reference Range & Units 03/14/24 11:28   HEMOGLOBIN 12.0 - 16.0 g/dL 10.9 (L)   HEMATOCRIT 36.0 - 46.0 % 33.4 (L)   (L): Data is abnormally low      Latest Reference Range & Units 03/14/24 05:33   WBC 4.4 - 11.3 x10*3/uL 10.6   nRBC 0.0 - 0.0 /100 WBCs 0.0   RBC 4.00 - 5.20 x10*6/uL 3.81 (L)   HEMOGLOBIN 12.0 - 16.0 g/dL 10.8 (L)   HEMATOCRIT 36.0 - 46.0 % 33.6 (L)   MCV 80 - 100 fL 88   MCH 26.0 - 34.0 pg 28.3   MCHC 32.0 - 36.0 g/dL 32.1   RED CELL DISTRIBUTION WIDTH 11.5 - 14.5 % 11.6   Platelets 150 - 450 x10*3/uL 408   Neutrophils % 40.0 - 80.0 % 71.0   Immature Granulocytes %, Automated 0.0 - 0.9 % 0.4   Lymphocytes % 13.0 - 44.0 % 20.8   Monocytes % 2.0 - 10.0 % 6.2   Eosinophils % 0.0 - 6.0 % 1.1   Basophils % 0.0 - 2.0 % 0.5   Neutrophils Absolute 1.20 - 7.70 x10*3/uL 7.52   Immature Granulocytes Absolute, Automated 0.00 - 0.70 x10*3/uL 0.04   Lymphocytes Absolute 1.20 - 4.80 x10*3/uL 2.21 "   Monocytes Absolute 0.10 - 1.00 x10*3/uL 0.66   Eosinophils Absolute 0.00 - 0.70 x10*3/uL 0.12   Basophils Absolute 0.00 - 0.10 x10*3/uL 0.05   (L): Data is abnormally low   Latest Reference Range & Units 03/14/24 05:33   GLUCOSE 65 - 99 mg/dL 89   SODIUM 133 - 145 mmol/L 139   POTASSIUM 3.4 - 5.1 mmol/L 3.9   CHLORIDE 97 - 107 mmol/L 104   Bicarbonate 24 - 31 mmol/L 25   Anion Gap <=19 mmol/L 10   Blood Urea Nitrogen 8 - 25 mg/dL 6 (L)   Creatinine 0.40 - 1.60 mg/dL 0.60   EGFR >60 mL/min/1.73m*2 >90   Calcium 8.5 - 10.4 mg/dL 8.6   PHOSPHORUS 2.5 - 4.5 mg/dL 3.4   Albumin 3.5 - 5.0 g/dL 3.6   (L): Data is abnormally low    Assessment/Plan     Unclear etiology of small bowel mesenteric inflammation.  No evidence of bleeding while in hospital so unclear if drop in hematocrit was secondary to hydration and underlying iron deficiency anemia with hemoccult positive secondary to rectal instrumentation or if she had a GI bleed.  I would recommend NPO today with IV fluids, bid ppi IV, and consult Dr. Han for upper endoscopy as he performed EGD and colonoscopy on her last year.  I reviewed plan with her, her nurse and Dr. Han.    I spent 60 minutes in the professional and overall care of this patient.  Two encounters with patient each 10 minutes  Preconsult chart review 10 minutes  Phone call with Dr. Pal 5 minutes  Review CT with Dr. Ramires 10 minutes  Phone call with Dr. Han 5 minutes  10 minutes documentation    Ken Kulkarni MD

## 2024-03-14 NOTE — ANESTHESIA PROCEDURE NOTES
Airway  Date/Time: 3/14/2024 5:15 PM  Urgency: elective      Staffing  Performed: IMANI   Authorized by: Jarrell Gamino MD    Performed by: IMANI Recinos  Patient location during procedure: OR    Indications and Patient Condition  Indications for airway management: anesthesia  Preoxygenated: yes  Mask difficulty assessment: 1 - vent by mask    Final Airway Details  Final airway type: endotracheal airway      Successful airway: ETT  Cuffed: yes   Successful intubation technique: direct laryngoscopy  Facilitating devices/methods: cricoid pressure and intubating stylet  Endotracheal tube insertion site: oral  Blade: William  Blade size: #3  ETT size (mm): 7.5  Cormack-Lehane Classification: grade I - full view of glottis  Placement verified by: capnometry   Measured from: lips  ETT to lips (cm): 22  Number of attempts at approach: 1    Additional Comments  RSI

## 2024-03-14 NOTE — OP NOTE
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  30595 - TX LAPS ABD PRTM&OMENTUM DX W/WO SPEC BR/WA SPX    Esophagogastroduodenoscopy      Surgeons      * Ken Kulkarni - Primary    Resident/Fellow/Other Assistant:  Surgeon(s) and Role:    Procedure Summary  Anesthesia: General  ASA: II  Anesthesia Staff: Anesthesiologist: Jarrell Gamino MD  C-AA: IMANI Recinos  Estimated Blood Loss: <10mL  Intra-op Medications:   Administrations occurring from 1630 to 1805 on 24:   Medication Name Total Dose   BUPivacaine HCl (Marcaine) 0.5 % (5 mg/mL) 30 mL, lidocaine (Xylocaine) 30 mL in sodium chloride 0.9 % 60 mL irrigation 120 mL   sodium chloride 0.9 % irrigation solution 1,000 mL   bisoprolol (Zebeta) tablet 2.5 mg Cannot be calculated   heparin (porcine) injection 5,000 Units Cannot be calculated   lactated Ringer's infusion Cannot be calculated   loratadine (Claritin) tablet 10 mg Cannot be calculated   morphine injection 2 mg Cannot be calculated   pantoprazole (ProtoNix) injection 40 mg Cannot be calculated   polyethylene glycol (Glycolax, Miralax) packet 17 g Cannot be calculated              Anesthesia Record               Intraprocedure I/O Totals          Intake    ceFAZolin in dextrose (iso-os) 2 gram/100 mL 100.00 mL    Total Intake 100 mL       Output    Est. Blood Loss 10 mL    Total Output 10 mL       Net    Net Volume 90 mL          Specimen: No specimens collected     Staff:   Circulator: Radha Rubio RN; Ghada Henry RN; Alivia Del Rio RN  Scrub Person: Nunu Vidales; Karishma Hernandez  RNFA:  This is a chronic problem.  Diet and exercise discussed.   Haydee Rose           Findings: inflammation around radha limb at mesocolic closure walled off by omentum and biliary limb, no leak    Indications: Dot Breen is a 38-year-old woman with a history of a Radha-en-Y gastric bypass 3 years ago that had a cholecystectomy and hysterectomy approximately 1 year ago.  She was last seen by me 1 year ago.  She tells me that she had an upper endoscopy showing that her Fofana's esophagus had resolved and she stopped her proton pump inhibitor despite having reflux symptoms.  She denies smoking or use of aspirin or nonsteroidal anti-inflammatories.  She came to the emergency room approximately 28 hours ago complaining of inability to have a bowel movement for 11 days despite taking MiraLAX daily as well as is giving herself 3 enemas.  She had a normal lab profile and a CAT scan that was initially read as diverticulitis and general surgery was consulted.  They recommended treating her diverticulitis as an outpatient but suggested the emergency room call me as she has a history of a Radha-en-Y gastric bypass.  When I evaluated her she had subxiphoid and left upper quadrant discomfort but appeared in no distress.  I reviewed the CAT scan with the radiologist who felt her findings were fairly nonspecific and nonconcerning.  I recommended that Dot take a twice daily proton pump inhibitor and come see me in the office next week to see if her symptoms improved as she denied any change in meal volume or appetite.  Her only complaint was inability to move her bowels.  She tells me that she works 7 days a week and drinks 4 cups of caffeinated liquid per day.  She has been under a lot of stress.  She was agreeable to the plan and was discharged from the emergency room she came back to the emergency room several hours later as she reportedly gave herself an enema when she got home and had a bloody bowel movement.  The emergency room admitted her to the hospitalist service as there was  no change in her physical exam and she had a mildly elevated white count.  This morning her white count normalized.  She still had left upper quadrant and subxiphoid pain.  We ordered a repeat CAT scan which I reviewed with radiologist.  There was no change.  This time she was given oral contrast and there was no leak or obstruction.  The plan was to give her a twice daily PPI today keep her n.p.o. and have GI do an endoscopy tomorrow morning.  I received an urgent call from the radiologist performing the official read, not the radiologist at [facility] that reviewed the CT scan with me several hours later stating he was concerned she had an internal hernia with small bowel mesentery that was compromised.  Based on this I decided to perform diagnostic laparoscopy to rule out an internal hernia.  I explained this to Dot with an understanding that I may find nothing at laparoscopy or I may find the cause of her subxiphoid pain.  Dot was seen in the preoperative area. The risks, benefits, complications, treatment options, non-operative alternatives, expected recovery and outcomes were discussed with the patient. The possibilities of reaction to medication, pulmonary aspiration, injury to surrounding structures, bleeding, recurrent infection, the need for additional procedures, failure to diagnose a condition, and creating a complication requiring transfusion or operation were discussed with the patient. The patient concurred with the proposed plan, giving informed consent.  The site of surgery was properly noted/marked if necessary per policy. The patient has been actively warmed in preoperative area. Preoperative antibiotics have been ordered and given within 1 hours of incision. Venous thrombosis prophylaxis have been ordered including bilateral sequential compression devices and chemical prophylaxis    Procedure Details: Dot was given subcutaneous heparin and antibiotics.  She was brought to the operating room  where preoperative huddle was completed.  Sequential compression devices were placed.  She was given IV sedation and intubated.  Her abdomen is prepped and draped in a sterile fashion.  I entered the abdomen left upper quadrant with a 0 degree scope and a 12 mm nonbladed trocar.  I insufflated the abdomen and switched an angled scope.  I placed a supraumbilical 5 mm trocar and 2 additional 5 mm trocars 1 in the left midabdomen and 1 in the right upper abdomen.  She had adhesions of her liver to the transverse colon mesentery covering her Heriberto limb.  I freed these up with a combination of sharp and blunt dissection using the harmonic scalpel.  Her Heriberto limb looked inflamed just above the [place] colon.  I freed up the liver from the gastric pouch.  The gastric pouch and proximal Heriberto limb did not look inflamed.  The candycane Heriberto limb did not look inflamed.  It appeared that she was walling off the Heriberto limb just above the level of the [place] colon with dense adhesions on the left side.  I flatten the patient and attempted to paddled up the omentum.  There were adhesions in the pelvis preventing me from doing this.  I put the patient in Trendelenburg and inspected her lower abdomen.  She had omental adhesions to the abdominal wall at site of her hysterectomy that I took down with harmonic scalpel.  This allowed me to paddled up her omentum.  Her transverse colon was decompressed and looked completely uninflamed.  The mesocolon looked uninflamed.  I paddled up the mesocolon and identified the Heriberto limb coming through the mesocolic window as she had an antigastric retrocolic gastric bypass.  I ran the Heriberto limb to the enteroenterostomy.  There were no adhesions or internal hernias visible.  I then ran the biliary limb to the ligament of Treitz.  The biliary limb sidewall was adherent to the Heriberto limb and mesocolic closure posteriorly on the left side.  This was consistent with the point of inflammation seen from the view  from above.  I attempted to peel the biliary limb off of the transverse colon mesentery.  There was a lot of neovascularization and inflammation.  The biliary loop as well as the mesentery were quite inflamed.  I did not feel comfortable persisting as it felt that I would make an enterotomy.  There was no evidence of bowel obstruction or ischemia.  I had Dr. Saleem perform an upper endoscopy.  I put a bowel clamp on the Heriberto limb just distal to the [place] colon.  The gastric pouch appeared completely normal the gastrojejunostomy appeared completely normal.  The blind end of the candycane appeared completely normal.  On going down the Heriberto limb just at the level of the [place] colon laterally where the greatest inflammation was we could see an Ethibond suture and an ulcer.  He grabbed the suture and it pulled out without any difficulty.  There was no air bubbles or anything to make I suspect that she had a perforation.  He completed his endoscopy.  I took off the bowel clamp.  Again attempted to free up the Heriberto limb from the [place] colon.  I felt that persisting would perforate the Heriberto limb so I aborted the operation.  I irrigated the abdomen.  It irrigated clear.  There was no evidence of obstruction.  The bypassed stomach was nondistended.  No loops of bowel were distended.  There was no other visible pathology.  I performed left rectus tap block with dilute Marcaine.  I removed the trocars under direct vision.  I closed the anterior rectus sheath in the left upper quadrant with an 0 Vicryl.  I irrigated subcutaneous tissue.  I closed the skin with 4-0 undyed Monocryl.  I placed Dermabond.  The patient's anesthetic was reversed, she was extubated and brought to the recovery in stable condition.  I consulted Dr. Gonzalez from infectious disease for recommendations on antibiotics.  Complications:  None; patient tolerated the procedure well.    Disposition: PACU - hemodynamically stable.  Condition: stable       Ken  Cayla  Phone Number: [phone]

## 2024-03-14 NOTE — ED PROVIDER NOTES
HPI   Chief Complaint   Patient presents with    Black or Bloody Stool     Pt reports being discharged from the ED around 1600 with constipation. Around 2000 pt attempted an enema at home and was followed with bloody stool. PT denies any new symptoms.        HPI  38-year-old female presents with complaint of 1 episode of bloody bowel movement.  The patient was seen earlier in the day for constipation abdominal pain had extensive workup was evaluated by her surgeon she went home when she had an episode of dark red bowel movement.  She has not had any since then.  Still having abdominal pain.  No nausea or vomiting.  No fevers or chills.  No other complaints.                  [place] Coma Scale Score: 15                     Patient History   Past Medical History:   Diagnosis Date    Other specified diabetes mellitus without complications (CMS/[facility])     Diabetes mellitus of other type without complication    Personal history of other diseases of the circulatory system     History of hypertension    Personal history of other mental and behavioral disorders 07/18/2017    History of anxiety disorder    Unspecified asthma, uncomplicated 01/04/2017    Asthmatic bronchitis     Past Surgical History:   Procedure Laterality Date    CHOLECYSTECTOMY  2023    HYSTERECTOMY  2023    OTHER SURGICAL HISTORY  06/09/2021    Gastric bypass surgery     Family History   Problem Relation Name Age of Onset    Heart disease Mother      Cancer Father       Social History     Tobacco Use    Smoking status: Never    Smokeless tobacco: Never   Vaping Use    Vaping Use: Never used   Substance Use Topics    Alcohol use: Never    Drug use: Never       Physical Exam   ED Triage Vitals [03/13/24 2050]   Temperature Heart Rate Respirations BP   36 °C (96.8 °F) (!) 116 18 175/86      Pulse Ox Temp Source Heart Rate Source Patient Position   100 % Temporal Monitor Sitting      BP Location FiO2 (%)     Left arm --       Physical Exam  General:  Awake,  alert, no acute distress.  Head: Normocephalic, Atraumatic  Neck: Supple, trachea midline, no stridor  Skin: Warm and dry, no rashes   Lungs: Clear to auscultation bilaterally no acute respiratory distress, speaking in full sentences without difficulty  CV: Regular Rate Rhythm with no obvious murmurs gallops rubs noted, no jugular venous distention, no pedal edema   Abdomen: Soft, nontender, nondistended, positive bowel sounds, no peritoneal signs  Rectal: Patient's nurse at bedside as chaperone: No external hemorrhoids or fissures, good rectal tone, no stool in the rectum, Hemoccult positive  Neuro:  No gross focal neurologic deficits,   Musculoskeletal:  Full range of motion in all 4 extremities  Psychiatric:  Alert oriented x 3, Good insight into condition.  ED Course & MDM   Diagnoses as of 03/14/24 0038   GI bleed       Medical Decision Making  Patient treated IV fluids, morphine, Zofran.  Her hemoglobin dropped from 12.3-11.2.  I contacted her surgeon and discussed the case with him as he had seen earlier in the day.  He recommend admitting her to the hospitalist and he will see her in consult.  I contacted the hospitalist for admission.  The patient was in agreement for admission.    Procedure  Procedures     Scottie Arrington,   03/14/24 0039

## 2024-03-14 NOTE — PROGRESS NOTES
Dot Breen is a 38 y.o. female on day 1 of admission presenting with GI bleed.      Subjective   The patient reports she has had no recurrent episodes of maroon stools since admission.  She has mild epigastric discomfort.  She has been treated with laxatives with no bowel movement yet today.  She denies nausea or vomiting.       Objective     Last Recorded Vitals  /73 (BP Location: Left arm, Patient Position: Sitting)   Pulse 85   Temp 37 °C (98.6 °F) (Oral)   Resp 18   Wt 79.4 kg (175 lb)   SpO2 98%   Intake/Output last 3 Shifts:    Intake/Output Summary (Last 24 hours) at 3/14/2024 1539  Last data filed at 3/14/2024 1348  Gross per 24 hour   Intake 1953.33 ml   Output --   Net 1953.33 ml       Admission Weight  Weight: 79.4 kg (175 lb) (03/13/24 2050)    Daily Weight  03/13/24 : 79.4 kg (175 lb)    Image Results  CT abdomen pelvis w IV contrast  Narrative: Interpreted By:  Christopher Lemus,   STUDY:  CT ABDOMEN PELVIS W IV CONTRAST; 3/14/2024 10:03 am      INDICATION:  Signs/Symptoms:abd pain, ulcer.      COMPARISON:  03/13/2024      ACCESSION NUMBER(S):  [id number]      ORDERING CLINICIAN:  DOUG MEANS      TECHNIQUE:  The examination was performed without the use of positive oral  contrast material as was requested.      The examination was performed with the intravenous administration of  75 ml of non-ionic iodinated contrast material.      One or more of the following dose reduction techniques were used:  Automated exposure control Adjustment of the mA and/or kV according  to patient size, and/or use of iterative reconstruction technique.      FINDINGS:  ABDOMEN CT:  SOLID ORGAN ASSESSMENT:  The visualized portions of the pulmonary bases are clear.  The liver,  spleen, pancreas, kidneys and adrenal glands are normal in  appearance. The calyceal systems, renal pelvis and ureters are within  normal limits.  Incidental note is made of the presence of surgical  clips in the right upper quadrant  consistent with a previous  cholecystectomy.      RETROPERITONEUM:  AORTA:  There is no evidence for abdominal aortic aneurysm.      LYMPH NODES: There is no evidence for retroperitoneal lymphadenopathy.      BOWEL ASSESSMENT:  No intraperitoneal free air is identified.      *There is increased attenuation with soft tissue density with  ill-defined stranding margins identified within left upper quadrant  gather together posterior to the transverse colon, highly suspicious  for herniation through the defect within the transverse mesocolon  created for the Heriberto-en-Y procedure. It is unclear whether the  herniated material simply mesenteric fat which is incarcerated or  there is a small knuckle of small bowel also herniating through this  defect. No small bowel obstruction is appreciated. the stranding  density does extend to the inferior margin of the small bowel loop  which is superior to the transverse mesocolon defect. There also  appears to be some edema of this loop suggesting there may be some  compromise vascular supply or venous congestion involving the small  bowel loop which is connected to the gastroesophageal junction. * The  colon is nonspecific in appearance.          ABDOMINAL AND PELVIC WALLS: There is no evidence for a hernia. No  abdominal wall masses are identified.      OSSEOUS STRUCTURES: No lytic or blastic lesions are identified.      PELVIC CT:  There is a small amount of free fluid within the dependent portion of  the pelvis. Uterus is absent. Adnexal structures are otherwise  unremarkable.      Impression: 1. Epicenter of the pathology appears to be the left upper quadrant  at the level of the surgically created defect within the transverse  mesocolon associated with patient's Heriberto-en-Y gastric bypass. There  is an inflammatory response within this distribution which appears to  extend to the mesenteric wall of the small bowel utilized for the  Heriberto-en-Y gastric bypass, above the transverse  mesocolon. There is no  small bowel obstruction associated with these findings. There is an  ill-defined soft tissue density at the narrow waist of the mesocolon  defect near the findings raise the possibility of incarceration of  either herniating mesenteric fat with associated potential impact  upon the vascular supply to the small-bowel utilized for the  Heriberto-en-Y gastric bypass or perhaps a knuckle of small bowel  herniating through this defect which is somewhat difficult to  delineate due to the inflammatory change. In either case I am  concerned about the vascular supply to the small-bowel component of  the Heriberto-en-Y bypass. Findings may be related to venous congestion  due to compression of the vasculature traversing the surgical defect  of the transverse mesocolon. Immediate surgical consultation is  recommended for assessment of this patient relative to possible from  Heriberto-en-Y gastric bypass as above.  2. There is no evidence for intraperitoneal free air or bowel  obstruction at this time.  3. There is some fluid within the dependent portion of the pelvis  which could be related to the above findings but could also be  physiologic with ovarian tissue still present bilaterally in this  38-year-old patient is status post hysterectomy.          COMMUNICATION  The critical information above was relayed directly by me by  telephone to Ken Huertas MD on 03/14/2020 at 1249 hours.      Signed by: Christopher Lemus 3/14/2024 12:51 PM  Dictation workstation:   [id number]      Physical Exam    Relevant Results               Assessment/Plan                  Principal Problem:    GI bleed  Active Problems:    Pain of upper abdomen    She has no active GI bleeding since admission.  Hemoglobins are decreased from admission and will be monitored.  She is asymptomatic with normal hemodynamic status.  Proton pump inhibitors continued.  She reports she is scheduled for upper endoscopy tomorrow.  Plan for further evaluation  and treatment per general surgery and gastroenterology.              Dao Michael MD

## 2024-03-14 NOTE — PERIOPERATIVE NURSING NOTE
Pt awake and alert, rates pain 5/10 and tolerable, requesting ice chips. HOB elevated >30 degrees. Ice chips given. Pt tolerating.

## 2024-03-14 NOTE — PROGRESS NOTES
"Dot Breen is a 38 y.o. female on day 1 of admission presenting with GI bleed.    Subjective   No change       Objective     Physical Exam  Abdomen with  subxiphoid and left upper quadrant tenderness  Last Recorded Vitals  Blood pressure 141/73, pulse 85, temperature 37 °C (98.6 °F), temperature source Oral, resp. rate 18, height 1.626 m (5' 4\"), weight 79.4 kg (175 lb), SpO2 98 %.  Intake/Output last 3 Shifts:  I/O last 3 completed shifts:  In: 1000 (12.6 mL/kg) [IV Piggyback:1000]  Out: - (0 mL/kg)   Weight: 79.4 kg     Relevant Results     Study Result    Narrative & Impression   Interpreted By:  Christopher Lemus,   STUDY:  CT ABDOMEN PELVIS W IV CONTRAST; 3/14/2024 10:03 am      INDICATION:  Signs/Symptoms:abd pain, ulcer.      COMPARISON:  03/13/2024      ACCESSION NUMBER(S):  [id number]      ORDERING CLINICIAN:  DOUG MEANS      TECHNIQUE:  The examination was performed without the use of positive oral  contrast material as was requested.      The examination was performed with the intravenous administration of  75 ml of non-ionic iodinated contrast material.      One or more of the following dose reduction techniques were used:  Automated exposure control Adjustment of the mA and/or kV according  to patient size, and/or use of iterative reconstruction technique.      FINDINGS:  ABDOMEN CT:  SOLID ORGAN ASSESSMENT:  The visualized portions of the pulmonary bases are clear.  The liver,  spleen, pancreas, kidneys and adrenal glands are normal in  appearance. The calyceal systems, renal pelvis and ureters are within  normal limits.  Incidental note is made of the presence of surgical  clips in the right upper quadrant consistent with a previous  cholecystectomy.      RETROPERITONEUM:  AORTA:  There is no evidence for abdominal aortic aneurysm.      LYMPH NODES: There is no evidence for retroperitoneal lymphadenopathy.      BOWEL ASSESSMENT:  No intraperitoneal free air is identified.      *There is " increased attenuation with soft tissue density with  ill-defined stranding margins identified within left upper quadrant  gather together posterior to the transverse colon, highly suspicious  for herniation through the defect within the transverse mesocolon  created for the Heriberto-en-Y procedure. It is unclear whether the  herniated material simply mesenteric fat which is incarcerated or  there is a small knuckle of small bowel also herniating through this  defect. No small bowel obstruction is appreciated. the stranding  density does extend to the inferior margin of the small bowel loop  which is superior to the transverse mesocolon defect. There also  appears to be some edema of this loop suggesting there may be some  compromise vascular supply or venous congestion involving the small  bowel loop which is connected to the gastroesophageal junction. * The  colon is nonspecific in appearance.          ABDOMINAL AND PELVIC WALLS: There is no evidence for a hernia. No  abdominal wall masses are identified.      OSSEOUS STRUCTURES: No lytic or blastic lesions are identified.      PELVIC CT:  There is a small amount of free fluid within the dependent portion of  the pelvis. Uterus is absent. Adnexal structures are otherwise  unremarkable.      IMPRESSION:  1. Epicenter of the pathology appears to be the left upper quadrant  at the level of the surgically created defect within the transverse  mesocolon associated with patient's Heriberto-en-Y gastric bypass. There  is an inflammatory response within this distribution which appears to  extend to the mesenteric wall of the small bowel utilized for the  Heriberto-en-Y gastric bypass, above the transverse mesocolon. There is no  small bowel obstruction associated with these findings. There is an  ill-defined soft tissue density at the narrow waist of the mesocolon  defect near the findings raise the possibility of incarceration of  either herniating mesenteric fat with associated  potential impact  upon the vascular supply to the small-bowel utilized for the  Heriberto-en-Y gastric bypass or perhaps a knuckle of small bowel  herniating through this defect which is somewhat difficult to  delineate due to the inflammatory change. In either case I am  concerned about the vascular supply to the small-bowel component of  the Heriberto-en-Y bypass. Findings may be related to venous congestion  due to compression of the vasculature traversing the surgical defect  of the transverse mesocolon. Immediate surgical consultation is  recommended for assessment of this patient relative to possible from  Heriberto-en-Y gastric bypass as above.  2. There is no evidence for intraperitoneal free air or bowel  obstruction at this time.  3. There is some fluid within the dependent portion of the pelvis  which could be related to the above findings but could also be  physiologic with ovarian tissue still present bilaterally in this  38-year-old patient is status post hysterectomy.          COMMUNICATION  The critical information above was relayed directly by me by  telephone to Ken Huertas MD on 03/14/2020 at 1249 hours.      Signed by: Christopher Lemus 3/14/2024 12:51 PM  Dictation workstation:   [id number]                    Assessment/Plan   Principal Problem:    GI bleed  Active Problems:    Pain of upper abdomen    I reviewed Dot's CT with Dr. Lemus.  He is concerned that she has an internal hernia.  Will perform diagnostic laparoscopy.  I asked Dr. Saleem to perform EGD while she is under general anesthesia.         Ken Kulkarni MD

## 2024-03-14 NOTE — ED NOTES
Report received. This RN assumed care. Pt placed in a hospital bed for comfort.      Shahrzad Denton RN  03/14/24 0257

## 2024-03-14 NOTE — ANESTHESIA PREPROCEDURE EVALUATION
Patient: Dot Breen    Procedure Information       Date/Time: 03/14/24 1630    Procedure: Exploration Laparoscopy    Location: [facility] / [facility]    Surgeons: Ken Kulkarni MD            Relevant Problems   GI   (+) GI bleed   (+) Gastroesophageal reflux disease without esophagitis       Clinical information reviewed:   Tobacco  Allergies  Meds   Med Hx  Surg Hx   Fam Hx  Soc Hx        NPO Detail:  NPO/Void Status  Date of Last Liquid: 03/14/24  Time of Last Liquid: 1230  Date of Last Solid: 03/14/24  Time of Last Solid: 1230         Physical Exam    Airway  Mallampati: II  TM distance: >3 FB  Neck ROM: full     Cardiovascular - normal exam     Dental - normal exam     Pulmonary - normal exam     Abdominal - normal exam             Anesthesia Plan    History of general anesthesia?: yes  History of complications of general anesthesia?: no    ASA 2     general     The patient is not a current smoker.  Patient was not previously instructed to abstain from smoking on day of procedure.  Patient did not smoke on day of procedure.  Education provided regarding risk of obstructive sleep apnea.  intravenous induction   Postoperative administration of opioids is intended.  Trial extubation is planned.  Anesthetic plan and risks discussed with patient.  Use of blood products discussed with patient who consented to blood products.    Plan discussed with CAA, attending and CRNA.

## 2024-03-14 NOTE — NURSING NOTE
Assumed care of patient at this time, patient is resting in bed with brake in place and call light in reach, denies pain.

## 2024-03-15 ENCOUNTER — APPOINTMENT (OUTPATIENT)
Dept: GASTROENTEROLOGY | Facility: HOSPITAL | Age: 39
DRG: 337 | End: 2024-03-15
Payer: COMMERCIAL

## 2024-03-15 ENCOUNTER — APPOINTMENT (OUTPATIENT)
Dept: RADIOLOGY | Facility: HOSPITAL | Age: 39
DRG: 337 | End: 2024-03-15
Payer: COMMERCIAL

## 2024-03-15 ENCOUNTER — PHARMACY VISIT (OUTPATIENT)
Dept: PHARMACY | Facility: CLINIC | Age: 39
End: 2024-03-15
Payer: COMMERCIAL

## 2024-03-15 VITALS
DIASTOLIC BLOOD PRESSURE: 78 MMHG | TEMPERATURE: 98.2 F | SYSTOLIC BLOOD PRESSURE: 140 MMHG | RESPIRATION RATE: 18 BRPM | HEART RATE: 96 BPM | WEIGHT: 175 LBS | BODY MASS INDEX: 29.88 KG/M2 | HEIGHT: 64 IN | OXYGEN SATURATION: 99 %

## 2024-03-15 LAB
ALBUMIN SERPL-MCNC: 3.1 G/DL (ref 3.5–5)
ALP BLD-CCNC: 61 U/L (ref 35–125)
ALT SERPL-CCNC: 21 U/L (ref 5–40)
ANION GAP SERPL CALC-SCNC: 12 MMOL/L
AST SERPL-CCNC: 23 U/L (ref 5–40)
BASOPHILS # BLD AUTO: 0.02 X10*3/UL (ref 0–0.1)
BASOPHILS NFR BLD AUTO: 0.2 %
BILIRUB SERPL-MCNC: <0.2 MG/DL (ref 0.1–1.2)
BUN SERPL-MCNC: 5 MG/DL (ref 8–25)
CALCIUM SERPL-MCNC: 8.5 MG/DL (ref 8.5–10.4)
CHLORIDE SERPL-SCNC: 105 MMOL/L (ref 97–107)
CO2 SERPL-SCNC: 24 MMOL/L (ref 24–31)
CREAT SERPL-MCNC: 0.6 MG/DL (ref 0.4–1.6)
EGFRCR SERPLBLD CKD-EPI 2021: >90 ML/MIN/1.73M*2
EOSINOPHIL # BLD AUTO: 0 X10*3/UL (ref 0–0.7)
EOSINOPHIL NFR BLD AUTO: 0 %
ERYTHROCYTE [DISTWIDTH] IN BLOOD BY AUTOMATED COUNT: 11.8 % (ref 11.5–14.5)
GLUCOSE SERPL-MCNC: 107 MG/DL (ref 65–99)
HCT VFR BLD AUTO: 31.2 % (ref 36–46)
HGB BLD-MCNC: 9.9 G/DL (ref 12–16)
IMM GRANULOCYTES # BLD AUTO: 0.04 X10*3/UL (ref 0–0.7)
IMM GRANULOCYTES NFR BLD AUTO: 0.3 % (ref 0–0.9)
LYMPHOCYTES # BLD AUTO: 1.84 X10*3/UL (ref 1.2–4.8)
LYMPHOCYTES NFR BLD AUTO: 15.4 %
MCH RBC QN AUTO: 28 PG (ref 26–34)
MCHC RBC AUTO-ENTMCNC: 31.7 G/DL (ref 32–36)
MCV RBC AUTO: 88 FL (ref 80–100)
MONOCYTES # BLD AUTO: 0.61 X10*3/UL (ref 0.1–1)
MONOCYTES NFR BLD AUTO: 5.1 %
NEUTROPHILS # BLD AUTO: 9.42 X10*3/UL (ref 1.2–7.7)
NEUTROPHILS NFR BLD AUTO: 79 %
NRBC BLD-RTO: 0 /100 WBCS (ref 0–0)
PLATELET # BLD AUTO: 397 X10*3/UL (ref 150–450)
POTASSIUM SERPL-SCNC: 4.2 MMOL/L (ref 3.4–5.1)
PROT SERPL-MCNC: 5.5 G/DL (ref 5.9–7.9)
RBC # BLD AUTO: 3.53 X10*6/UL (ref 4–5.2)
SODIUM SERPL-SCNC: 141 MMOL/L (ref 133–145)
WBC # BLD AUTO: 11.9 X10*3/UL (ref 4.4–11.3)

## 2024-03-15 PROCEDURE — 36415 COLL VENOUS BLD VENIPUNCTURE: CPT | Performed by: SURGERY

## 2024-03-15 PROCEDURE — 74240 X-RAY XM UPR GI TRC 1CNTRST: CPT

## 2024-03-15 PROCEDURE — 80053 COMPREHEN METABOLIC PANEL: CPT | Performed by: SURGERY

## 2024-03-15 PROCEDURE — RXMED WILLOW AMBULATORY MEDICATION CHARGE

## 2024-03-15 PROCEDURE — 2500000005 HC RX 250 GENERAL PHARMACY W/O HCPCS: Performed by: SURGERY

## 2024-03-15 PROCEDURE — 2500000001 HC RX 250 WO HCPCS SELF ADMINISTERED DRUGS (ALT 637 FOR MEDICARE OP): Performed by: SURGERY

## 2024-03-15 PROCEDURE — C9113 INJ PANTOPRAZOLE SODIUM, VIA: HCPCS | Performed by: SURGERY

## 2024-03-15 PROCEDURE — 2550000001 HC RX 255 CONTRASTS: Performed by: INTERNAL MEDICINE

## 2024-03-15 PROCEDURE — 85025 COMPLETE CBC W/AUTO DIFF WBC: CPT | Performed by: SURGERY

## 2024-03-15 PROCEDURE — 2500000004 HC RX 250 GENERAL PHARMACY W/ HCPCS (ALT 636 FOR OP/ED): Performed by: SURGERY

## 2024-03-15 PROCEDURE — 2500000001 HC RX 250 WO HCPCS SELF ADMINISTERED DRUGS (ALT 637 FOR MEDICARE OP): Performed by: NURSE PRACTITIONER

## 2024-03-15 PROCEDURE — 2500000004 HC RX 250 GENERAL PHARMACY W/ HCPCS (ALT 636 FOR OP/ED): Performed by: NURSE PRACTITIONER

## 2024-03-15 RX ORDER — BISOPROLOL FUMARATE 5 MG/1
2.5 TABLET, FILM COATED ORAL DAILY
Status: DISCONTINUED | OUTPATIENT
Start: 2024-03-15 | End: 2024-03-15 | Stop reason: HOSPADM

## 2024-03-15 RX ORDER — OMEPRAZOLE 20 MG/1
20 CAPSULE, DELAYED RELEASE ORAL EVERY 12 HOURS
Qty: 60 CAPSULE | Refills: 5 | Status: SHIPPED | OUTPATIENT
Start: 2024-03-15 | End: 2024-03-22

## 2024-03-15 RX ORDER — ONDANSETRON 4 MG/1
4 TABLET, FILM COATED ORAL EVERY 8 HOURS PRN
Qty: 20 TABLET | Refills: 0 | Status: SHIPPED | OUTPATIENT
Start: 2024-03-15 | End: 2024-05-23 | Stop reason: WASHOUT

## 2024-03-15 RX ORDER — AMOXICILLIN AND CLAVULANATE POTASSIUM 400; 57 MG/5ML; MG/5ML
875 POWDER, FOR SUSPENSION ORAL EVERY 12 HOURS SCHEDULED
Qty: 300 ML | Refills: 0 | Status: SHIPPED | OUTPATIENT
Start: 2024-03-15 | End: 2024-03-29

## 2024-03-15 RX ORDER — LORATADINE 10 MG/1
10 TABLET ORAL DAILY
Status: DISCONTINUED | OUTPATIENT
Start: 2024-03-15 | End: 2024-03-15 | Stop reason: HOSPADM

## 2024-03-15 RX ORDER — OXYCODONE AND ACETAMINOPHEN 5; 325 MG/1; MG/1
1 TABLET ORAL EVERY 6 HOURS PRN
Qty: 6 TABLET | Refills: 0 | Status: SHIPPED | OUTPATIENT
Start: 2024-03-15 | End: 2024-03-18

## 2024-03-15 RX ADMIN — ONDANSETRON HYDROCHLORIDE 4 MG: 2 INJECTION INTRAMUSCULAR; INTRAVENOUS at 12:03

## 2024-03-15 RX ADMIN — ACETAMINOPHEN 1000 MG: 10 INJECTION INTRAVENOUS at 09:07

## 2024-03-15 RX ADMIN — ACETAMINOPHEN 1000 MG: 10 INJECTION INTRAVENOUS at 03:57

## 2024-03-15 RX ADMIN — PANTOPRAZOLE SODIUM 40 MG: 40 INJECTION, POWDER, FOR SOLUTION INTRAVENOUS at 09:07

## 2024-03-15 RX ADMIN — BUPRENORPHINE HYDROCHLORIDE 0.1 MG: 0.32 INJECTION INTRAMUSCULAR; INTRAVENOUS at 13:39

## 2024-03-15 RX ADMIN — Medication 1 TABLET: at 12:05

## 2024-03-15 RX ADMIN — BUPRENORPHINE HYDROCHLORIDE 0.1 MG: 0.32 INJECTION INTRAMUSCULAR; INTRAVENOUS at 06:39

## 2024-03-15 RX ADMIN — PIPERACILLIN SODIUM AND TAZOBACTAM SODIUM 4.5 G: 4; .5 INJECTION, SOLUTION INTRAVENOUS at 05:28

## 2024-03-15 RX ADMIN — LORATADINE 10 MG: 10 TABLET ORAL at 12:05

## 2024-03-15 RX ADMIN — IOHEXOL 22.5 ML: 350 INJECTION, SOLUTION INTRAVENOUS at 08:40

## 2024-03-15 RX ADMIN — PROCHLORPERAZINE EDISYLATE 10 MG: 5 INJECTION INTRAMUSCULAR; INTRAVENOUS at 14:52

## 2024-03-15 RX ADMIN — Medication 2 L/MIN: at 08:00

## 2024-03-15 RX ADMIN — ACETAMINOPHEN 1000 MG: 10 INJECTION INTRAVENOUS at 15:00

## 2024-03-15 RX ADMIN — SODIUM CHLORIDE, SODIUM LACTATE, POTASSIUM CHLORIDE, AND CALCIUM CHLORIDE 125 ML/HR: 600; 310; 30; 20 INJECTION, SOLUTION INTRAVENOUS at 07:24

## 2024-03-15 RX ADMIN — PIPERACILLIN SODIUM AND TAZOBACTAM SODIUM 4.5 G: 4; .5 INJECTION, SOLUTION INTRAVENOUS at 13:46

## 2024-03-15 RX ADMIN — BISOPROLOL FUMARATE 2.5 MG: 5 TABLET, FILM COATED ORAL at 12:05

## 2024-03-15 ASSESSMENT — COGNITIVE AND FUNCTIONAL STATUS - GENERAL
DAILY ACTIVITIY SCORE: 24
MOBILITY SCORE: 24

## 2024-03-15 ASSESSMENT — PAIN SCALES - GENERAL
PAINLEVEL_OUTOF10: 3
PAINLEVEL_OUTOF10: 3
PAINLEVEL_OUTOF10: 5 - MODERATE PAIN
PAINLEVEL_OUTOF10: 4
PAINLEVEL_OUTOF10: 5 - MODERATE PAIN

## 2024-03-15 ASSESSMENT — PAIN - FUNCTIONAL ASSESSMENT
PAIN_FUNCTIONAL_ASSESSMENT: 0-10

## 2024-03-15 ASSESSMENT — ENCOUNTER SYMPTOMS
NAUSEA: 1
FATIGUE: 0
CHILLS: 0

## 2024-03-15 ASSESSMENT — PAIN DESCRIPTION - LOCATION
LOCATION: ABDOMEN
LOCATION: ABDOMEN

## 2024-03-15 NOTE — NURSING NOTE
Assumed care of patient at this time, patient is resting in bed with brake in place and call light in reach denies any needs. NPO status in place, patient aware

## 2024-03-15 NOTE — CARE PLAN
The patient's goals for the shift include      The clinical goals for the shift include Adequate rest, pain control    Over the shift, the patient did not make progress toward the following goals. Barriers to progression include . Recommendations to address these barriers include .      Problem: Pain - Adult  Goal: Verbalizes/displays adequate comfort level or baseline comfort level  3/15/2024 0649 by Dex Dawson RN  Outcome: Progressing  3/15/2024 0200 by Dex Dawson RN  Flowsheets (Taken 3/15/2024 0200)  Verbalizes/displays adequate comfort level or baseline comfort level:   Encourage patient to monitor pain and request assistance   Administer analgesics based on type and severity of pain and evaluate response   Consider cultural and social influences on pain and pain management   Assess pain using appropriate pain scale   Implement non-pharmacological measures as appropriate and evaluate response   Notify Licensed Independent Practitioner if interventions unsuccessful or patient reports new pain     Problem: Safety - Adult  Goal: Free from fall injury  3/15/2024 0649 by Dex Dawson RN  Outcome: Progressing  3/15/2024 0200 by Dex Dawson RN  Flowsheets (Taken 3/15/2024 0200)  Free from fall injury:   Instruct family/caregiver on patient safety   Based on caregiver fall risk screen, instruct family/caregiver to ask for assistance with transferring infant if caregiver noted to have fall risk factors     Problem: Discharge Planning  Goal: Discharge to home or other facility with appropriate resources  3/15/2024 0649 by Dex Dawson RN  Outcome: Progressing  3/15/2024 0200 by Dex Dawson RN  Flowsheets (Taken 3/15/2024 0200)  Discharge to home or other facility with appropriate resources:   Identify barriers to discharge with patient and caregiver   Identify discharge learning needs (meds, wound care, etc)   Refer to discharge planning if patient needs post-hospital services based on physician order or complex needs  related to functional status, cognitive ability or social support system   Arrange for needed discharge resources and transportation as appropriate   Arrange for interpreters to assist at discharge as needed     Problem: Chronic Conditions and Co-morbidities  Goal: Patient's chronic conditions and co-morbidity symptoms are monitored and maintained or improved  3/15/2024 0649 by Dex Dawson RN  Outcome: Progressing  3/15/2024 0200 by Dex Dawson RN  Flowsheets (Taken 3/15/2024 0200)  Care Plan - Patient's Chronic Conditions and Co-Morbidity Symptoms are Monitored and Maintained or Improved:   Monitor and assess patient's chronic conditions and comorbid symptoms for stability, deterioration, or improvement   Collaborate with multidisciplinary team to address chronic and comorbid conditions and prevent exacerbation or deterioration   Update acute care plan with appropriate goals if chronic or comorbid symptoms are exacerbated and prevent overall improvement and discharge     Problem: Diabetes  Goal: Increase stability of blood glucose readings by end of shift  3/15/2024 0649 by Dex Dawson RN  Outcome: Progressing  3/15/2024 0200 by Dex Dawson RN  Flowsheets (Taken 3/15/2024 0200)  Increase stability of blood glucose readings by end of shift: Med administration/monitoring of effect  Goal: Decrease in ketones present in urine by end of shift  3/15/2024 0649 by Dex Dawson RN  Outcome: Progressing  3/15/2024 0200 by Dex Dawson RN  Flowsheets (Taken 3/15/2024 0200)  Decrease in ketones present in urine by end of shift:   Med administration/monitoring of effect   Monitor urine output  Goal: Learn about and adhere to nutrition recommendations by end of shift  3/15/2024 0649 by Dex Dawson RN  Outcome: Progressing  3/15/2024 0200 by Dex Dawson RN  Flowsheets (Taken 3/15/2024 0200)  Learn about and adhere to nutrition recommendations by end of shift: Ensure/encourage compliance with appropriate diet  Goal: Receive DSME  education by end of shift  3/15/2024 0649 by Dex Dawson RN  Outcome: Progressing  3/15/2024 0200 by Dex Dawson RN  Flowsheets (Taken 3/15/2024 0200)  Receive DSME education by end of shift: Provide patient centered education on Diabetic Self Management Education     Problem: Pain  Goal: Participates in PT with improved pain control throughout the shift  Outcome: Progressing  Goal: Free from opioid side effects throughout the shift  Outcome: Progressing  Goal: Free from acute confusion related to pain meds throughout the shift  Outcome: Progressing     Order review at end of shift completed. Patient on bed awake, no distress noted.  Expresses no other needs at the present.  Safety measures in place, call light within reach.  Plan of care ongoing.

## 2024-03-15 NOTE — PROGRESS NOTES
"Dot Breen is a 38 y.o. female on day 2 of admission presenting with GI bleed.    Subjective   Feeling ok. Some nausea today. No fevers or chills. Tolerating clears.        Objective     Physical Exam  Constitutional:       Appearance: Normal appearance.   HENT:      Head: Normocephalic and atraumatic.      Right Ear: Tympanic membrane normal.      Nose: Nose normal.      Mouth/Throat:      Mouth: Mucous membranes are moist.   Eyes:      Pupils: Pupils are equal, round, and reactive to light.   Cardiovascular:      Rate and Rhythm: Normal rate and regular rhythm.      Pulses: Normal pulses.   Pulmonary:      Effort: Pulmonary effort is normal.      Breath sounds: Normal breath sounds.   Abdominal:      General: Bowel sounds are normal. There is no distension.      Tenderness: There is no abdominal tenderness. There is no guarding.      Hernia: No hernia is present.      Comments: ABD lap sites are CDI   Genitourinary:     Rectum: Normal.   Musculoskeletal:         General: Normal range of motion.   Skin:     General: Skin is warm and dry.   Neurological:      General: No focal deficit present.      Mental Status: She is alert.   Psychiatric:         Mood and Affect: Mood normal.         Behavior: Behavior normal.         Last Recorded Vitals  Blood pressure 140/78, pulse 96, temperature 36.8 °C (98.2 °F), temperature source Oral, resp. rate 18, height 1.626 m (5' 4\"), weight 79.4 kg (175 lb), SpO2 99 %.  Intake/Output last 3 Shifts:  I/O last 3 completed shifts:  In: 4920 (62 mL/kg) [I.V.:3170 (39.9 mL/kg); IV Piggyback:1750]  Out: 10 (0.1 mL/kg) [Blood:10]  Weight: 79.4 kg     Relevant Results                             Assessment/Plan   Principal Problem:    GI bleed  Active Problems:    Pain of upper abdomen    3/15/24 POD 1 lap SRINIVASAN. ADAT. Ambulate. DVT prophylaxis. Gi prophylaxis.        I spent 15 minutes in the professional and overall care of this patient.      Shu Miles, MARLON-CNP    I saw the " patient after Shu.  The nurse, Shu Clancy, and the patient attributed the nausea to the buprenex.  Dot did well with liquids after the nausea and requested discharge.  She assured me she does not get nauseated with percocet or tylenol.

## 2024-03-15 NOTE — CONSULTS
Consults      Primary MD: Bethany Frias, APRN-CNP    Reason For Consult  Antibiotic management    History Of Present Illness  Dot Breen is a 38 y.o. female presenting with constipation and maroon stool.  She has a history of Heriberto-en-Y bypass in 2021.  She has not had a bowel movement for the last 11 days despite using laxatives.  She has a CT that was remarkable for possible gastrojejunal ulcer.  She had left upper and lower quadrant abdominal pain, leading to her admission.  She underwent laparoscopic lysis of adhesions on 3/14/2024.  Had interbowel discussion with bariatric surgeon and advised IV Zosyn as empiric antibiotic therapy because of concern of contained perforation.  She reports mild nausea.  She denies any abdominal pain.  She denies any fever or chills.  She wants to go home       Past Medical History  She has a past medical history of Other specified diabetes mellitus without complications (CMS/[facility]), Personal history of other diseases of the circulatory system, Personal history of other mental and behavioral disorders (07/18/2017), and Unspecified asthma, uncomplicated (01/04/2017).    Surgical History  She has a past surgical history that includes Other surgical history (06/09/2021); Hysterectomy (2023); and Cholecystectomy (2023).     Social History     Occupational History    Not on file   Tobacco Use    Smoking status: Never    Smokeless tobacco: Never   Vaping Use    Vaping Use: Never used   Substance and Sexual Activity    Alcohol use: Never    Drug use: Never    Sexual activity: Not on file     Travel History   Travel since 02/15/24    No documented travel since 02/15/24           Family History  Family History   Problem Relation Name Age of Onset    Heart disease Mother      Cancer Father       Allergies  Vilazodone and Trazodone     Immunization History   Administered Date(s) Administered    Moderna SARS-CoV-2 Vaccination 08/05/2021, 12/12/2021    Pfizer COVID-19 vaccine, Fall 2023,  12 years and older, (30mcg/0.3mL) 12/20/2023    Pfizer COVID-19 vaccine, bivalent, age 12 years and older (30 mcg/0.3 mL) 11/11/2022     Medications  Home medications:  Medications Prior to Admission   Medication Sig Dispense Refill Last Dose    bisoprolol (Zebeta) 5 mg tablet Take 1 tablet (5 mg) by mouth once daily. Takes 1/2 daily   3/14/2024    levocetirizine (Xyzal) 5 mg tablet Take 1 tablet (5 mg) by mouth once daily at bedtime.   3/14/2024    omeprazole (PriLOSEC) 40 mg DR capsule Take 1 capsule (40 mg) by mouth once daily in the morning. Take before meals.       omeprazole (PriLOSEC) 40 mg DR capsule Take 1 capsule (40 mg) by mouth once daily. 90 capsule 0 3/14/2024    omeprazole (PriLOSEC) 40 mg DR capsule Take 1 capsule (40 mg) by mouth once daily. Do not crush or chew. 30 capsule 0 3/14/2024    polyethylene glycol (Glycolax, Miralax) 17 gram packet Take 17 g by mouth 2 times a day for 14 doses. 14 packet 0 Unknown     Current medications:  Scheduled medications  acetaminophen, 1,000 mg, intravenous, q6h  bisoprolol, 2.5 mg, oral, Daily  loratadine, 10 mg, oral, Daily  multivitamins with iron, 1 tablet, oral, Daily  oxygen, 2 L/min, inhalation, Continuous - 02/gases  pantoprazole, 40 mg, intravenous, BID  piperacillin-tazobactam, 4.5 g, intravenous, q8h      Continuous medications  lactated Ringer's, 125 mL/hr, Last Rate: 125 mL/hr (03/15/24 1305)      PRN medications  PRN medications: buprenorphine, hydrALAZINE, LORazepam, naloxone, ondansetron, prochlorperazine    Review of Systems   Constitutional:  Negative for chills and fatigue.   Gastrointestinal:  Positive for nausea.   All other systems reviewed and are negative.       Objective  Range of Vitals (last 24 hours)  Heart Rate:  []   Temp:  [36 °C (96.8 °F)-37 °C (98.6 °F)]   Resp:  [9-21]   BP: (128-153)/()   SpO2:  [93 %-100 %]   Daily Weight  03/13/24 : 79.4 kg (175 lb)    Body mass index is 30.04 kg/m².     Physical  Exam  Constitutional:       Appearance: Normal appearance.   HENT:      Head: Normocephalic and atraumatic.      Nose: Nose normal.   Eyes:      Extraocular Movements: Extraocular movements intact.      Conjunctiva/sclera: Conjunctivae normal.   Cardiovascular:      Rate and Rhythm: Normal rate and regular rhythm.      Heart sounds: Normal heart sounds.   Pulmonary:      Breath sounds: Normal breath sounds.   Abdominal:      General: Bowel sounds are normal.      Palpations: Abdomen is soft.   Musculoskeletal:      Cervical back: Normal range of motion and neck supple.   Skin:     General: Skin is warm and dry.   Neurological:      Mental Status: She is alert.   Psychiatric:         Mood and Affect: Mood normal.         Behavior: Behavior normal.          Relevant Results  Outside Hospital Results    Labs  Results from last 72 hours   Lab Units 03/15/24  0508 03/14/24  1128 03/14/24  0533 03/13/24  2233   WBC AUTO x10*3/uL 11.9*  --  10.6 12.9*   HEMOGLOBIN g/dL 9.9* 10.9* 10.8* 11.2*   HEMATOCRIT % 31.2* 33.4* 33.6* 35.0*   PLATELETS AUTO x10*3/uL 397  --  408 480*   NEUTROS PCT AUTO % 79.0  --  71.0 71.4   LYMPHS PCT AUTO % 15.4  --  20.8 19.5   MONOS PCT AUTO % 5.1  --  6.2 7.1   EOS PCT AUTO % 0.0  --  1.1 1.1     Results from last 72 hours   Lab Units 03/15/24  0508 03/14/24  0533 03/13/24  1249   SODIUM mmol/L 141 139 139   POTASSIUM mmol/L 4.2 3.9 4.1   CHLORIDE mmol/L 105 104 103   CO2 mmol/L 24 25 24   BUN mg/dL 5* 6* 10   CREATININE mg/dL 0.60 0.60 0.50   GLUCOSE mg/dL 107* 89 93   CALCIUM mg/dL 8.5 8.6 8.9   ANION GAP mmol/L 12 10 12   EGFR mL/min/1.73m*2 >90 >90 >90   PHOSPHORUS mg/dL  --  3.4  --      Results from last 72 hours   Lab Units 03/15/24  0508 03/14/24  0533 03/13/24  1249   ALK PHOS U/L 61  --  81   BILIRUBIN TOTAL mg/dL <0.2  --  <0.2   PROTEIN TOTAL g/dL 5.5*  --  7.2   ALT U/L 21  --  14   AST U/L 23  --  12   ALBUMIN g/dL 3.1* 3.6 3.7     Estimated Creatinine Clearance: 125 mL/min (by  "C-G formula based on SCr of 0.6 mg/dL).  C-Reactive Protein   Date Value Ref Range Status   03/01/2024 5.10 (H) 0.00 - 2.00 mg/dL Final     Sedimentation Rate   Date Value Ref Range Status   03/01/2024 61 (H) 0 - 20 mm/h Final     HIV 1 and 2 Screen   Date Value Ref Range Status   09/14/2019 Non Reactive  Reference range: NONREACTIVE    Final     HIV 1/2 Antibody Confirmation   Date Value Ref Range Status   09/14/2019 Test Not Indicated  Final     No results found for: \"HEPCABINIT\", \"HEPCAB\", \"HCVPCRQUANT\"  Microbiology  Reviewed  Imaging  FL upper GI w KUB    Result Date: 3/15/2024  Interpreted By:  Isabel Ramires, STUDY: FL UPPER GI W KUB 3/15/2024 8:48 am   INDICATION: Signs/Symptoms:postop with laparoscopic lysis of adhesions and with visualization ulcer along the distal aspect of the Heriberto limb.   COMPARISON: 05/13/2021   ACCESSION NUMBER(S): [id number]   ORDERING CLINICIAN: KE NEWMAN   TECHNIQUE: Erect frontal and lateral views of the upper abdomen are performed with multiple surgical clips identified within the left side of the abdomen and with additional surgical clips in right upper quadrant from prior cholecystectomy. There are small air-fluid levels identified within the bowel located within right upper quadrant.   Water-soluble contrast was ingested by the patient with upper gastrointestinal examination performed. The distal thoracic esophagus is unremarkable in appearance as is the esophagogastric junction. Gastric pouch is normally visualized. The proximal gastrojejunal anastomosis is unchanged in appearance since 05/13/2021. There is no leak or extravasation of contrast at this site. The Heriberto limb is normal caliber without obstruction..   Fluoroscopic time: 1 minute and 38 seconds   Air kerma: 222.4 mGy   FINDINGS: No leak demonstrated on the water-soluble upper gastrointestinal examination in this patient who is status post prior gastric bypass surgery and laparoscopic lysis of adhesions yesterday " with laparoscopic surgery and upper endoscopy. No obstruction on upper gastrointestinal examination.       Intraoperative fluoroscopic guidance provided for pain management service.   Signed by: Isabel Ramires 3/15/2024 9:37 AM Dictation workstation:   [id number]    CT abdomen pelvis w IV contrast    Result Date: 3/14/2024  Interpreted By:  Christopher Lemus, STUDY: CT ABDOMEN PELVIS W IV CONTRAST; 3/14/2024 10:03 am   INDICATION: Signs/Symptoms:abd pain, ulcer.   COMPARISON: 03/13/2024   ACCESSION NUMBER(S): [id number]   ORDERING CLINICIAN: DOUG MEANS   TECHNIQUE: The examination was performed without the use of positive oral contrast material as was requested.   The examination was performed with the intravenous administration of 75 ml of non-ionic iodinated contrast material.   One or more of the following dose reduction techniques were used: Automated exposure control Adjustment of the mA and/or kV according to patient size, and/or use of iterative reconstruction technique.   FINDINGS: ABDOMEN CT: SOLID ORGAN ASSESSMENT: The visualized portions of the pulmonary bases are clear.  The liver, spleen, pancreas, kidneys and adrenal glands are normal in appearance. The calyceal systems, renal pelvis and ureters are within normal limits.  Incidental note is made of the presence of surgical clips in the right upper quadrant consistent with a previous cholecystectomy.   RETROPERITONEUM: AORTA:  There is no evidence for abdominal aortic aneurysm.   LYMPH NODES: There is no evidence for retroperitoneal lymphadenopathy.   BOWEL ASSESSMENT: No intraperitoneal free air is identified.   *There is increased attenuation with soft tissue density with ill-defined stranding margins identified within left upper quadrant gather together posterior to the transverse colon, highly suspicious for herniation through the defect within the transverse mesocolon created for the Heriberto-en-Y procedure. It is unclear whether the herniated  material simply mesenteric fat which is incarcerated or there is a small knuckle of small bowel also herniating through this defect. No small bowel obstruction is appreciated. the stranding density does extend to the inferior margin of the small bowel loop which is superior to the transverse mesocolon defect. There also appears to be some edema of this loop suggesting there may be some compromise vascular supply or venous congestion involving the small bowel loop which is connected to the gastroesophageal junction. * The colon is nonspecific in appearance.     ABDOMINAL AND PELVIC WALLS: There is no evidence for a hernia. No abdominal wall masses are identified.   OSSEOUS STRUCTURES: No lytic or blastic lesions are identified.   PELVIC CT: There is a small amount of free fluid within the dependent portion of the pelvis. Uterus is absent. Adnexal structures are otherwise unremarkable.       1. Epicenter of the pathology appears to be the left upper quadrant at the level of the surgically created defect within the transverse mesocolon associated with patient's Heriberto-en-Y gastric bypass. There is an inflammatory response within this distribution which appears to extend to the mesenteric wall of the small bowel utilized for the Heriberto-en-Y gastric bypass, above the transverse mesocolon. There is no small bowel obstruction associated with these findings. There is an ill-defined soft tissue density at the narrow waist of the mesocolon defect near the findings raise the possibility of incarceration of either herniating mesenteric fat with associated potential impact upon the vascular supply to the small-bowel utilized for the Heriberto-en-Y gastric bypass or perhaps a knuckle of small bowel herniating through this defect which is somewhat difficult to delineate due to the inflammatory change. In either case I am concerned about the vascular supply to the small-bowel component of the Heriberto-en-Y bypass. Findings may be related to  venous congestion due to compression of the vasculature traversing the surgical defect of the transverse mesocolon. Immediate surgical consultation is recommended for assessment of this patient relative to possible from Heriberto-en-Y gastric bypass as above. 2. There is no evidence for intraperitoneal free air or bowel obstruction at this time. 3. There is some fluid within the dependent portion of the pelvis which could be related to the above findings but could also be physiologic with ovarian tissue still present bilaterally in this 38-year-old patient is status post hysterectomy.     COMMUNICATION The critical information above was relayed directly by me by telephone to Ken Huertas MD on 03/14/2020 at 1249 hours.   Signed by: Christopher Lemus 3/14/2024 12:51 PM Dictation workstation:   [id number]    CT abdomen pelvis w IV contrast    Result Date: 3/13/2024  Interpreted By:  Sedrick Peña, STUDY: CT ABDOMEN PELVIS W IV CONTRAST; 3/13/2024 2:26 pm   INDICATION: Left lower quadrant pain   COMPARISON: None   ACCESSION NUMBER(S): [id number]   ORDERING CLINICIAN: ANH PANIAGUA   TECHNIQUE: Contiguous axial images of the abdomen/pelvis were performed with IV contrast. 75 ml of Omnipaque 350 was utilized. Coronal and sagittal reformatted images were also obtained. All CT examinations are performed with 1 or more of the following dose reduction techniques: Automated exposure control, adjustment of mA and/or kv according to patient's size, or use of iterative reconstruction techniques.     FINDINGS: The liver, common bile duct, pancreas, spleen, and adrenal glands are unremarkable. Prior cholecystectomy with surgical clips in the gallbladder fossa.   The kidneys enhance symmetrically. No urolithiasis is seen. No hydroureteronephrosis is seen.   The visualized aorta is unremarkable.   Postsurgical changes of gastric bypass are seen. No abnormal bowel dilatation. There is mild twisting and swirling of the mesentery  adjacent to the gastric bypass in the left upper quadrant suggestion of mild inflammatory change however no definitive acute abnormality is seen. Diverticulosis of the sigmoid colon. Adjacent to the sigmoid colon is a small fluid collection extending into the lower pelvis which may reflect mild pelvic ascites however early abscess can not be entirely excluded. The fluid collection does not appear to be associated with the diverticulum however acute diverticulitis can not be excluded. Otherwise the colon shows no evidence for acute inflammatory process.   Prior hysterectomy.   The bladder is well distended with no gross wall thickening.   The visualized osseous structures are intact.   Limited images of the lower thorax are unremarkable.       Postsurgical changes of gastric bypass are seen. No abnormal bowel dilatation. There is mild twisting and swirling of the mesentery adjacent to the gastric bypass in the left upper quadrant with suggestion of mild inflammatory change however no definitive acute abnormality is seen. Diverticulosis of the sigmoid colon. Adjacent to the sigmoid colon is a small fluid collection extending into the lower pelvis which may reflect mild pelvic ascites however early abscess can not be entirely excluded. The fluid collection does not appear to be associated with the diverticulum however acute diverticulitis can not be excluded.   Signed by: Sedrick Peña 3/13/2024 2:43 PM Dictation workstation:   [id number]     Assessment/Plan   GI bleed  Marginal leukocytosis  Suspected perforation    Continue Zosyn while inpatient  Long-term plan is to discharge on liquid Augmentin to complete total of 14 days  Monitor temperature and WBC  Discharge planning      Jeff Gonzalez MD

## 2024-03-15 NOTE — DISCHARGE INSTRUCTIONS
--  Take the Omeprazole daily as prescribed    --  Start the oral antibiotics by tomorrow AM and finish the 14 days course as directed    --  Stay on a liquid diet all weekend and slowly advance to solids on Monday    --  Face away from the shower head and avoid submerging incisions, avoid lifting, bending, pushing, pulling or twisting    --   Keep you upcoming appointment with Dr Wallace    -- If you develop worsening pain, fevers, chills, or are unable to keep down food/fluids please return to the ER

## 2024-03-15 NOTE — PROGRESS NOTES
Dot Breen is a 38 y.o. female on day 2 of admission presenting with GI bleed.      Subjective   Patient seen on rounds.  She is supine in bed, NAD.  Reports she is feeling good and wants to go home today.         Objective     Last Recorded Vitals  /66 (BP Location: Right arm, Patient Position: Lying)   Pulse 95   Temp 37 °C (98.6 °F) (Oral)   Resp 18   Wt 79.4 kg (175 lb)   SpO2 98%   Intake/Output last 3 Shifts:    Intake/Output Summary (Last 24 hours) at 3/15/2024 1443  Last data filed at 3/15/2024 1305  Gross per 24 hour   Intake 3450 ml   Output 10 ml   Net 3440 ml       Admission Weight  Weight: 79.4 kg (175 lb) (03/13/24 2050)    Daily Weight  03/13/24 : 79.4 kg (175 lb)    Image Results  FL upper GI w KUB  Narrative: Interpreted By:  Isabel Ramires,   STUDY:  FL UPPER GI W KUB 3/15/2024 8:48 am      INDICATION:  Signs/Symptoms:postop with laparoscopic lysis of adhesions and with  visualization ulcer along the distal aspect of the Heriberto limb.      COMPARISON:  05/13/2021      ACCESSION NUMBER(S):  [id number]      ORDERING CLINICIAN:  KE NEWMAN      TECHNIQUE:  Erect frontal and lateral views of the upper abdomen are performed  with multiple surgical clips identified within the left side of the  abdomen and with additional surgical clips in right upper quadrant  from prior cholecystectomy. There are small air-fluid levels  identified within the bowel located within right upper quadrant.      Water-soluble contrast was ingested by the patient with upper  gastrointestinal examination performed. The distal thoracic esophagus  is unremarkable in appearance as is the esophagogastric junction.  Gastric pouch is normally visualized. The proximal gastrojejunal  anastomosis is unchanged in appearance since 05/13/2021. There is no  leak or extravasation of contrast at this site. The Heriberto limb is  normal caliber without obstruction..      Fluoroscopic time: 1 minute and 38 seconds      Air kerma: 222.4  mGy      FINDINGS:  No leak demonstrated on the water-soluble upper gastrointestinal  examination in this patient who is status post prior gastric bypass  surgery and laparoscopic lysis of adhesions yesterday with  laparoscopic surgery and upper endoscopy. No obstruction on upper  gastrointestinal examination.      Impression: Intraoperative fluoroscopic guidance provided for pain management  service.      Signed by: Isabel Ramires 3/15/2024 9:37 AM  Dictation workstation:   [id number]      Physical Exam  Vitals and nursing note reviewed.   Constitutional:       General: She is not in acute distress.     Appearance: She is normal weight. She is not toxic-appearing.   HENT:      Head: Normocephalic and atraumatic.   Eyes:      Extraocular Movements: Extraocular movements intact.      Pupils: Pupils are equal, round, and reactive to light.   Cardiovascular:      Rate and Rhythm: Normal rate and regular rhythm.      Heart sounds: No murmur heard.     No gallop.   Pulmonary:      Effort: Pulmonary effort is normal.      Breath sounds: Normal breath sounds.   Abdominal:      General: There is no distension.      Palpations: Abdomen is soft.   Musculoskeletal:      Cervical back: Normal range of motion and neck supple.   Skin:     General: Skin is warm and dry.   Neurological:      General: No focal deficit present.      Mental Status: She is alert and oriented to person, place, and time.   Psychiatric:         Mood and Affect: Mood normal.         Behavior: Behavior normal.       Medications Discontinued During This Encounter   Medication Reason    lidocaine (Xylocaine) 10 mg/mL (1 %) injection 1 mg Patient Transfer    lactated Ringer's infusion Patient Transfer    fentaNYL PF (Sublimaze) injection 50 mcg Patient Transfer    HYDROmorphone (Dilaudid) injection 0.4 mg Patient Transfer    midazolam (Versed) injection 1 mg Patient Transfer    ondansetron (Zofran) injection 4 mg Patient Transfer    promethazine (Phenergan) 6.25  mg in sodium chloride 0.9% 50 mL IV Patient Transfer    hydrALAZINE (Apresoline) injection 5 mg Patient Transfer    meperidine (PF) (Demerol) injection 12.5 mg Patient Transfer    albuterol 2.5 mg /3 mL (0.083 %) nebulizer solution 2.5 mg Patient Transfer    sodium chloride 0.9 % irrigation solution Patient Discharge    BUPivacaine HCl (Marcaine) 0.5 % (5 mg/mL) 30 mL, lidocaine (Xylocaine) 30 mL in sodium chloride 0.9 % 60 mL irrigation Patient Discharge    lidocaine (Xylocaine) 10 mg/mL (1 %) injection 1 mg Patient Transfer    lactated Ringer's infusion Patient Transfer    fentaNYL PF (Sublimaze) injection 50 mcg Patient Transfer    HYDROmorphone (Dilaudid) injection 0.4 mg Patient Transfer    midazolam (Versed) injection 1 mg Patient Transfer    ondansetron (Zofran) injection 4 mg Patient Transfer    promethazine (Phenergan) 6.25 mg in sodium chloride 0.9% 50 mL IV Patient Transfer    hydrALAZINE (Apresoline) injection 5 mg Patient Transfer    meperidine (PF) (Demerol) injection 12.5 mg Patient Transfer    albuterol 2.5 mg /3 mL (0.083 %) nebulizer solution 2.5 mg Patient Transfer    fentaNYL PF (Sublimaze) injection 50 mcg Patient Transfer    acetaminophen (Tylenol) tablet 650 mg     acetaminophen (Tylenol) oral liquid 650 mg     acetaminophen (Tylenol) suppository 650 mg     ondansetron ODT (Zofran-ODT) disintegrating tablet 4 mg     ondansetron (Zofran) injection 4 mg     polyethylene glycol (Glycolax, Miralax) packet 17 g     morphine injection 2 mg     pantoprazole (ProtoNix) injection 40 mg     bisoprolol (Zebeta) tablet 2.5 mg     loratadine (Claritin) tablet 10 mg     lactated Ringer's infusion     heparin (porcine) injection 5,000 Units        Results for orders placed or performed during the hospital encounter of 03/13/24 (from the past 24 hour(s))   Comprehensive metabolic panel   Result Value Ref Range    Glucose 107 (H) 65 - 99 mg/dL    Sodium 141 133 - 145 mmol/L    Potassium 4.2 3.4 - 5.1 mmol/L     Chloride 105 97 - 107 mmol/L    Bicarbonate 24 24 - 31 mmol/L    Urea Nitrogen 5 (L) 8 - 25 mg/dL    Creatinine 0.60 0.40 - 1.60 mg/dL    eGFR >90 >60 mL/min/1.73m*2    Calcium 8.5 8.5 - 10.4 mg/dL    Albumin 3.1 (L) 3.5 - 5.0 g/dL    Alkaline Phosphatase 61 35 - 125 U/L    Total Protein 5.5 (L) 5.9 - 7.9 g/dL    AST 23 5 - 40 U/L    Bilirubin, Total <0.2 0.1 - 1.2 mg/dL    ALT 21 5 - 40 U/L    Anion Gap 12 <=19 mmol/L   CBC and Auto Differential   Result Value Ref Range    WBC 11.9 (H) 4.4 - 11.3 x10*3/uL    nRBC 0.0 0.0 - 0.0 /100 WBCs    RBC 3.53 (L) 4.00 - 5.20 x10*6/uL    Hemoglobin 9.9 (L) 12.0 - 16.0 g/dL    Hematocrit 31.2 (L) 36.0 - 46.0 %    MCV 88 80 - 100 fL    MCH 28.0 26.0 - 34.0 pg    MCHC 31.7 (L) 32.0 - 36.0 g/dL    RDW 11.8 11.5 - 14.5 %    Platelets 397 150 - 450 x10*3/uL    Neutrophils % 79.0 40.0 - 80.0 %    Immature Granulocytes %, Automated 0.3 0.0 - 0.9 %    Lymphocytes % 15.4 13.0 - 44.0 %    Monocytes % 5.1 2.0 - 10.0 %    Eosinophils % 0.0 0.0 - 6.0 %    Basophils % 0.2 0.0 - 2.0 %    Neutrophils Absolute 9.42 (H) 1.20 - 7.70 x10*3/uL    Immature Granulocytes Absolute, Automated 0.04 0.00 - 0.70 x10*3/uL    Lymphocytes Absolute 1.84 1.20 - 4.80 x10*3/uL    Monocytes Absolute 0.61 0.10 - 1.00 x10*3/uL    Eosinophils Absolute 0.00 0.00 - 0.70 x10*3/uL    Basophils Absolute 0.02 0.00 - 0.10 x10*3/uL         Relevant Results               Assessment/Plan        Principal Problem:    GI bleed  Active Problems:    Pain of upper abdomen    --  continue plan of care per Surgery  --  ID to see, will await recs for homegoing antibx  --  possible DC later today                Xi Rebollar, APRN-CNP

## 2024-03-15 NOTE — NURSING NOTE
Received report from Miri PACU Nurse.    2013H:  Patient arrived on the floor per stretcher from ED.  No distress or discomfort noted.  Oriented to room.  All needs attended.  Safety measures ensured and call light in reach.    Noted surgical incisions dry, no redness, no discharges and intact.  Incisions sites are open to air.    2130H:  Noted patient having anxiety.  Patient is complaining of tingling sensations on her left finger tips, and not having abdominal pain from surgery.  This Nurse explained the possible reasons for her symptoms but wants to talk to a doctor.  Spoke with Dr. Pal, no orders made.    2149H:  Secured chat Dr. Kulkarni about the patient's complaints.  Dr. Kulkarni spoke with the patient through the patients personal phone number.  Patient eventually calmed down.  Dr. Kulkarni ordered  for anxiolytics.    2155H:  Secured chat Dr. Pal to place an order for anxiolytics per Dr. Kulkarni's order. Orders johnson.      03/15/2024  0640H:  ROXANE hose application explained and applied, incentive spirometry instructions given.

## 2024-03-15 NOTE — PROGRESS NOTES
Subjective   Patient ID: Dot Breen is a 38 y.o. female who presents for No chief complaint on file..  [facility] FUV/ EGD SCHEDULED 05/02/24   START WT:  203     IDEAL WT: 140        START EXCESS:63  TOTAL WT LOSS:   32        EWL:    51 %   Review of Systems  Bariatric Surgery F/U:          Seen at ER after surgery? No.  Hospital admission? No.  Bariatric reoperation? No.  Bariatric intervention? No.  Was anticoagulation initiated for presumed/confirmed Vein Thrombosis/PE? No.  Was an incisional hernia noted on exam? No.  Sleep Apnea? No.  Still using C-PAP? No.  GERD req. meds? No.  Hyperlipidemia? No.  Still taking Cholesterol med? No.  Hypertension? YES.  Still taking HTN med?YES  Number of Anti-hypertensive Medications:1/2  Diabetes? No.  Still taking DM med? No.  Current DM medication type: _____.         CONSTITUTIONAL:          Chills No.  Fatigue No.  Fever No.         CARDIOLOGY:          Negative for dizziness, chest pain, palpitations, shortness of breath.         RESPIRATORY:          Negative for chest congestion, cough, wheezing.         GASTROENTEROLOGY:          Food Intolerance No.  Acid reflux No.  Abdominal pain No.  Black stools No.  Constipation No.  Diarrhea No.  Loss of appetite no.  Nausea No.  Vomiting No.         UROLOGY:          Negative for dysuria, urinary urgency, kidney stones.         PSYCHOLOGY:          Negative for depression, anxiety, high stress level.   Objective   Physical Exam    Assessment/Plan            Radha Quiroga LPN 03/15/24 8:36 AM

## 2024-03-15 NOTE — NURSING NOTE
REVIEWED DISCHARGE WITH PATIENT AT FAMILY, PATIENT HAS MEDICATION AT BEDSIDE, AWARE OF ALL INSTRUCTIONS. AND FOLLOW UPS

## 2024-03-15 NOTE — PROGRESS NOTES
"Dot Breen is a 38 y.o. female on day 2 of admission presenting with GI bleed.    Subjective   Feels great.  Has no pain with oral intake.  She would like to go home today.       Objective     Physical Exam  Abdomen soft, incisions dry without tenderness  Last Recorded Vitals  Blood pressure 140/78, pulse 96, temperature 36.8 °C (98.2 °F), temperature source Oral, resp. rate 18, height 1.626 m (5' 4\"), weight 79.4 kg (175 lb), SpO2 99 %.  Intake/Output last 3 Shifts:  I/O last 3 completed shifts:  In: 4920 (62 mL/kg) [I.V.:3170 (39.9 mL/kg); IV Piggyback:1750]  Out: 10 (0.1 mL/kg) [Blood:10]  Weight: 79.4 kg     Relevant Results        Latest Reference Range & Units 03/15/24 05:08   GLUCOSE 65 - 99 mg/dL 107 (H)   SODIUM 133 - 145 mmol/L 141   POTASSIUM 3.4 - 5.1 mmol/L 4.2   CHLORIDE 97 - 107 mmol/L 105   Bicarbonate 24 - 31 mmol/L 24   Anion Gap <=19 mmol/L 12   Blood Urea Nitrogen 8 - 25 mg/dL 5 (L)   Creatinine 0.40 - 1.60 mg/dL 0.60   EGFR >60 mL/min/1.73m*2 >90   Calcium 8.5 - 10.4 mg/dL 8.5   Albumin 3.5 - 5.0 g/dL 3.1 (L)   Alkaline Phosphatase 35 - 125 U/L 61   ALT 5 - 40 U/L 21   AST 5 - 40 U/L 23   Bilirubin Total 0.1 - 1.2 mg/dL <0.2   Total Protein 5.9 - 7.9 g/dL 5.5 (L)   WBC 4.4 - 11.3 x10*3/uL 11.9 (H)   nRBC 0.0 - 0.0 /100 WBCs 0.0   RBC 4.00 - 5.20 x10*6/uL 3.53 (L)   HEMOGLOBIN 12.0 - 16.0 g/dL 9.9 (L)   HEMATOCRIT 36.0 - 46.0 % 31.2 (L)   MCV 80 - 100 fL 88   MCH 26.0 - 34.0 pg 28.0   MCHC 32.0 - 36.0 g/dL 31.7 (L)   RED CELL DISTRIBUTION WIDTH 11.5 - 14.5 % 11.8   Platelets 150 - 450 x10*3/uL 397   Neutrophils % 40.0 - 80.0 % 79.0   Immature Granulocytes %, Automated 0.0 - 0.9 % 0.3   Lymphocytes % 13.0 - 44.0 % 15.4   Monocytes % 2.0 - 10.0 % 5.1   Eosinophils % 0.0 - 6.0 % 0.0   Basophils % 0.0 - 2.0 % 0.2   Neutrophils Absolute 1.20 - 7.70 x10*3/uL 9.42 (H)   Immature Granulocytes Absolute, Automated 0.00 - 0.70 x10*3/uL 0.04   Lymphocytes Absolute 1.20 - 4.80 x10*3/uL 1.84   Monocytes " Absolute 0.10 - 1.00 x10*3/uL 0.61   Eosinophils Absolute 0.00 - 0.70 x10*3/uL 0.00   Basophils Absolute 0.00 - 0.10 x10*3/uL 0.02   (H): Data is abnormally high  (L): Data is abnormally low         FL upper GI w KUB  Status: Final result     PACS Images     Show images for FL upper GI w KUB  Signed by    Signed Time Phone Pager   Isabel Ramires MD 3/15/2024 09:37 [phone]      Exam Information    Status Exam Begun Exam Ended   Final 3/15/2024 08:24 3/15/2024 08:48     Study Result    Narrative & Impression   Interpreted By:  Isabel Ramires,   STUDY:  FL UPPER GI W KUB 3/15/2024 8:48 am      INDICATION:  Signs/Symptoms:postop with laparoscopic lysis of adhesions and with  visualization ulcer along the distal aspect of the Heriberto limb.      COMPARISON:  05/13/2021      ACCESSION NUMBER(S):  [id number]      ORDERING CLINICIAN:  KE NEWMAN      TECHNIQUE:  Erect frontal and lateral views of the upper abdomen are performed  with multiple surgical clips identified within the left side of the  abdomen and with additional surgical clips in right upper quadrant  from prior cholecystectomy. There are small air-fluid levels  identified within the bowel located within right upper quadrant.      Water-soluble contrast was ingested by the patient with upper  gastrointestinal examination performed. The distal thoracic esophagus  is unremarkable in appearance as is the esophagogastric junction.  Gastric pouch is normally visualized. The proximal gastrojejunal  anastomosis is unchanged in appearance since 05/13/2021. There is no  leak or extravasation of contrast at this site. The Heriberto limb is  normal caliber without obstruction..      Fluoroscopic time: 1 minute and 38 seconds      Air kerma: 222.4 mGy      FINDINGS:  No leak demonstrated on the water-soluble upper gastrointestinal  examination in this patient who is status post prior gastric bypass  surgery and laparoscopic lysis of adhesions yesterday with  laparoscopic  surgery and upper endoscopy. No obstruction on upper  gastrointestinal examination.      IMPRESSION:  Intraoperative fluoroscopic guidance provided for pain management  service.      Signed by: Isabel Ramires 3/15/2024 9:37 AM  Dictation workstation:   [id number]              Assessment/Plan   Principal Problem:    GI bleed  Active Problems:    Pain of upper abdomen    Plan will be bid ppi for 4 months and repeat EGD in 4-6 weeks.  She will complete 14 day course of abx per Dr. Gonzalez.  I asked Dot to stay on liquids for the weekend and then resume solids on Monday.  I reminded her to face away from the shower and pat dry, avoid submerging incisions and avoid lifting, bending, pushing, pulling or twisting.  She has an appointment to see me in one week.         Ken Kulkarni MD

## 2024-03-16 NOTE — DISCHARGE SUMMARY
Discharge Diagnosis  GI bleed    Issues Requiring Follow-Up  GI bleed  Constipation    Discharge Meds     Your medication list        START taking these medications        Instructions Last Dose Given Next Dose Due   amoxicillin-pot clavulanate 400-57 mg/5 mL suspension  Commonly known as: Augmentin      Take 10.9 mL (875 mg) by mouth every 12 hours for 13 days. (must refill to complete therapy)       ondansetron 4 mg tablet  Commonly known as: Zofran      Take 1 tablet (4 mg) by mouth every 8 hours if needed for nausea or vomiting.       oxyCODONE-acetaminophen 5-325 mg tablet  Commonly known as: Percocet      Take 1 tablet by mouth every 6 hours if needed for severe pain (7 - 10) (Due not take more than 4,000mg of acetaminophen in any 24 hour period.) for up to 3 days.              CHANGE how you take these medications        Instructions Last Dose Given Next Dose Due   omeprazole 20 mg DR capsule  Commonly known as: PriLOSEC  What changed:   medication strength  how much to take  when to take this  additional instructions  Another medication with the same name was removed. Continue taking this medication, and follow the directions you see here.      Take 1 capsule (20 mg) by mouth every 12 hours. Do not crush or chew.              CONTINUE taking these medications        Instructions Last Dose Given Next Dose Due   bisoprolol 5 mg tablet  Commonly known as: Zebeta           levocetirizine 5 mg tablet  Commonly known as: Xyzal           polyethylene glycol 17 gram packet  Commonly known as: Glycolax, Miralax      Take 17 g by mouth 2 times a day for 14 doses.                 Where to Get Your Medications        These medications were sent [facility]  [id number] [facility] [place] [address]      Hours: 9 AM to 6 PM Mon-Fri, 9 AM to 1 PM Sat Phone: [phone]   amoxicillin-pot clavulanate 400-57 mg/5 mL suspension  omeprazole 20 mg DR capsule  ondansetron 4 mg tablet  oxyCODONE-acetaminophen  5-325 mg tablet         Test Results Pending At Discharge  Pending Labs       No current pending labs.            Hospital Course     Patient presented to [facility], [place], operated by [facility] ER on 3/13/2024 for evaluation of constipation, no BM in 11 days despite miralax and enemas-  she noticed maroom stool and a blood clot.  In the ER had LUQ pain.   Labs obtained without significant abnormality. CT shows nonspecific postsurgical changes as well as mild twisting and swirling of the mesentery adjacent to her gastric bypass site in left upper quadrant as well as some inflammatory change unable to definitively rule out diverticulitis as there is a small amount of free fluid in her pelvis cannot rule out developing abscess. ER Consulted general surgery, Dr Chaudhary who recommended to consult Dr. Kulkarni 2/2 her hx of Heriberto-en-Y gastric bypass in 2021-  he independently evaluate patient in the emergency department and did not feel there is any complication related to her gastric bypass site nor diverticulitis and she was admitted for observation to service of hospitalist.       She was seen again by General surgery who had concern for gastrojejunal ulcer and advised EGD, started IV PPI, did repeat CT on 3/14 which showed concern for internal hernia and she was taken to OR on the evening of 3/14 for exploratory lap and lysis of adhesions and EGD.  Post-op dx: Jejunal ulcer, possible contained perforation  for which ID was consulted for antibitoi recommendations-  advised liquid augmentin for 14 days.    On 3/15/205, she tolerated clears without issue, pain was controlled and she wished to be discharged.      DC Plan will be bid ppi for 4 months and repeat EGD in 4-6 weeks. She will complete 14 day course of abx per Dr. Gonzalez. She was advised by surgery to stay on liquids for the weekend and then resume solids on Monday.   She will follow up with Dr Kulkarni in one week.   She was discharged home in stable condition    Pertinent  Physical Exam At Time of Discharge    Constitutional:       Appearance: Normal appearance.   HENT:      Head: Normocephalic and atraumatic.      Right Ear: Tympanic membrane normal.      Nose: Nose normal.      Mouth/Throat:      Mouth: Mucous membranes are moist.   Eyes:      Pupils: Pupils are equal, round, and reactive to light.   Cardiovascular:      Rate and Rhythm: Normal rate and regular rhythm.      Pulses: Normal pulses.   Pulmonary:      Effort: Pulmonary effort is normal.      Breath sounds: Normal breath sounds.   Abdominal:      General: Bowel sounds are normal. There is no distension.      Tenderness: There is no abdominal tenderness. There is no guarding.      Hernia: No hernia is present.      Comments: ABD lap sites are CDI   Genitourinary:     Rectum: Normal.   Musculoskeletal:         General: Normal range of motion.   Skin:     General: Skin is warm and dry.   Neurological:      General: No focal deficit present.      Mental Status: She is alert.   Psychiatric:         Mood and Affect: Mood normal.         Behavior: Behavior normal.       Outpatient Follow-Up  Future Appointments   Date Time Provider Department Center   3/22/2024  2:30 PM Ken Kulkarni MD [id number] [facility]   5/2/2024  9:30 AM Ken Kulkarni MD [id number] [facility]   5/23/2024 10:30 AM Jarrell Hernandez MD [id number] [facility]         Xi Rebollar APRN-CNP

## 2024-03-18 ENCOUNTER — TELEPHONE (OUTPATIENT)
Dept: SURGERY | Facility: CLINIC | Age: 39
End: 2024-03-18
Payer: COMMERCIAL

## 2024-03-22 ENCOUNTER — OFFICE VISIT (OUTPATIENT)
Dept: SURGERY | Facility: CLINIC | Age: 39
End: 2024-03-22
Payer: COMMERCIAL

## 2024-03-22 VITALS
DIASTOLIC BLOOD PRESSURE: 131 MMHG | BODY MASS INDEX: 29.19 KG/M2 | HEIGHT: 64 IN | HEART RATE: 86 BPM | SYSTOLIC BLOOD PRESSURE: 164 MMHG | WEIGHT: 171 LBS

## 2024-03-22 DIAGNOSIS — K21.9 GASTROESOPHAGEAL REFLUX DISEASE WITHOUT ESOPHAGITIS: ICD-10-CM

## 2024-03-22 DIAGNOSIS — R10.10 PAIN OF UPPER ABDOMEN: Primary | ICD-10-CM

## 2024-03-22 PROCEDURE — 99024 POSTOP FOLLOW-UP VISIT: CPT | Performed by: SURGERY

## 2024-03-22 PROCEDURE — 1036F TOBACCO NON-USER: CPT | Performed by: SURGERY

## 2024-03-22 RX ORDER — OMEPRAZOLE 40 MG/1
40 CAPSULE, DELAYED RELEASE ORAL
Qty: 180 CAPSULE | Refills: 3 | Status: SHIPPED | OUTPATIENT
Start: 2024-03-22 | End: 2024-06-10

## 2024-03-22 RX ORDER — POLYETHYLENE GLYCOL 3350 17 G/17G
17 POWDER, FOR SOLUTION ORAL DAILY
COMMUNITY

## 2024-03-22 ASSESSMENT — PATIENT HEALTH QUESTIONNAIRE - PHQ9
2. FEELING DOWN, DEPRESSED OR HOPELESS: NOT AT ALL
SUM OF ALL RESPONSES TO PHQ9 QUESTIONS 1 AND 2: 0
1. LITTLE INTEREST OR PLEASURE IN DOING THINGS: NOT AT ALL

## 2024-03-22 ASSESSMENT — PAIN SCALES - GENERAL: PAINLEVEL: 0-NO PAIN

## 2024-03-22 ASSESSMENT — ENCOUNTER SYMPTOMS
LOSS OF SENSATION IN FEET: 0
DEPRESSION: 0
OCCASIONAL FEELINGS OF UNSTEADINESS: 0

## 2024-03-22 NOTE — H&P
History Of Present Illness  Dot Breen is a 38 y.o. woman here for follow up.  She was hospitalized 10 days ago with abdominal pain.  She had RYGB in 2021.  She went to the ED on 3/13 with inability to have bowel movements despite giving herself multiple enemas.  She was admitted after having an abnormal CT and a bloody bowel movement.  Dr. Lemus was concerned that Dot had an internal hernia prompting diagnostic laparoscopy.  She did not have a hernia so EGD was done showing what appears to be an ulcer with perigastric inflammation.  She has done well since discharge with no fevers or chills.  She has no subxiphoid pain since discharge.  She is taking omeprazole 40 mg bid and augementin bid to complete a fourteen day course of antibiotics.  She is scheduled for an EGD 6 weeks after initiation of bid ppi.  She is able to eat tougher texture foods without difficulty or pain.  She had an english muffin with an egg for breakfast and turkey last night.     Past Medical History  Past Medical History:   Diagnosis Date    Other specified diabetes mellitus without complications (CMS/HCC)     Diabetes mellitus of other type without complication    Personal history of other diseases of the circulatory system     History of hypertension    Personal history of other mental and behavioral disorders 07/18/2017    History of anxiety disorder    Unspecified asthma, uncomplicated 01/04/2017    Asthmatic bronchitis       Surgical History  Past Surgical History:   Procedure Laterality Date    CHOLECYSTECTOMY  2023    HYSTERECTOMY  2023    OTHER SURGICAL HISTORY  06/09/2021    Gastric bypass surgery    OTHER SURGICAL HISTORY  03/2024        Social History  She reports that she has quit smoking. Her smoking use included cigarettes. She has never used smokeless tobacco. She reports that she does not drink alcohol and does not use drugs.    Family History  Family History   Problem Relation Name Age of Onset    Heart disease Mother       Cancer Father          Allergies  Vilazodone and Trazodone    Review of Systems   Bariatric Surgery F/U:          Seen at ER after surgery? No.  Hospital admission? No.  Bariatric reoperation? No.  Bariatric intervention? No.  Was anticoagulation initiated for presumed/confirmed Vein Thrombosis/PE? No.  Was an incisional hernia noted on exam? No.  Sleep Apnea? No.  Still using C-PAP? No.  GERD req. meds? No.  Hyperlipidemia? No.  Still taking Cholesterol med? No.  Hypertension? YES.  Still taking HTN med?YES  Number of Anti-hypertensive Medications:1/2  Diabetes? No.  Still taking DM med? No.  Current DM medication type: _____.         CONSTITUTIONAL:          Chills No.  Fatigue No.  Fever No.         CARDIOLOGY:          Negative for dizziness, chest pain, palpitations, shortness of breath.         RESPIRATORY:          Negative for chest congestion, cough, wheezing.         GASTROENTEROLOGY:          Food Intolerance No.  Acid reflux No.  Abdominal pain No.  Black stools No.  Constipation No.  Diarrhea No.  Loss of appetite no.  Nausea No.  Vomiting No.         UROLOGY:          Negative for dysuria, urinary urgency, kidney stones.         PSYCHOLOGY:          Negative for depression, anxiety, high stress level.         Physical Exam       General Examination:         GENERAL APPEARANCE: alert and oriented x 3, Pleasant and cooperative, No Acute Distress.          HEENT: PERRLA.          HEART: regular rate and rhythm.          LUNGS: clear to auscultation bilaterally.          CHEST: normal shape and expansion.          ABDOMEN: incisions healing, no redness or drainage or tenderness, no hernias present, soft and not tender, no guarding, no CVA tenderness.          EXTREMITIES: no edema, no ulcerations.          SKIN: normal, no rash.          NEUROLOGIC EXAM: CN's II-XII grossly intact, gait normal.          BACK: no CVA tenderness.       Last Recorded Vitals  Blood pressure (!) 164/131, pulse 86, height  "1.626 m (5' 4\"), weight 77.6 kg (171 lb).      Assessment/Plan   Problem List Items Addressed This Visit             ICD-10-CM    Gastroesophageal reflux disease without esophagitis K21.9    Relevant Medications    omeprazole (PriLOSEC) 40 mg DR capsule    Other Relevant Orders    Request for Pre-Admission Testing Visit    Pain of upper abdomen - Primary R10.10    Relevant Medications    omeprazole (PriLOSEC) 40 mg DR capsule    Other Relevant Orders    CT abdomen pelvis w and wo IV contrast    CBC and Auto Differential    Basic metabolic panel       Continue on bi ppi.  We will obtain repeat CT to assess radha limb mesentery and follow up repeat EGD to assess gastrojejunostomy and radha limb.         Ken Kulkarni MD    "

## 2024-03-30 ENCOUNTER — LAB (OUTPATIENT)
Dept: LAB | Facility: LAB | Age: 39
End: 2024-03-30
Payer: COMMERCIAL

## 2024-03-30 DIAGNOSIS — R10.10 PAIN OF UPPER ABDOMEN: ICD-10-CM

## 2024-03-30 LAB
ANION GAP SERPL CALC-SCNC: 12 MMOL/L
BASOPHILS # BLD AUTO: 0.07 X10*3/UL (ref 0–0.1)
BASOPHILS NFR BLD AUTO: 1.1 %
BUN SERPL-MCNC: 10 MG/DL (ref 8–25)
CALCIUM SERPL-MCNC: 9.4 MG/DL (ref 8.5–10.4)
CHLORIDE SERPL-SCNC: 102 MMOL/L (ref 97–107)
CO2 SERPL-SCNC: 27 MMOL/L (ref 24–31)
CREAT SERPL-MCNC: 0.7 MG/DL (ref 0.4–1.6)
EGFRCR SERPLBLD CKD-EPI 2021: >90 ML/MIN/1.73M*2
EOSINOPHIL # BLD AUTO: 0.33 X10*3/UL (ref 0–0.7)
EOSINOPHIL NFR BLD AUTO: 5 %
ERYTHROCYTE [DISTWIDTH] IN BLOOD BY AUTOMATED COUNT: 13.4 % (ref 11.5–14.5)
GLUCOSE SERPL-MCNC: 85 MG/DL (ref 65–99)
HCT VFR BLD AUTO: 41.4 % (ref 36–46)
HGB BLD-MCNC: 12.8 G/DL (ref 12–16)
IMM GRANULOCYTES # BLD AUTO: 0.02 X10*3/UL (ref 0–0.7)
IMM GRANULOCYTES NFR BLD AUTO: 0.3 % (ref 0–0.9)
LYMPHOCYTES # BLD AUTO: 2.55 X10*3/UL (ref 1.2–4.8)
LYMPHOCYTES NFR BLD AUTO: 38.4 %
MCH RBC QN AUTO: 28.4 PG (ref 26–34)
MCHC RBC AUTO-ENTMCNC: 30.9 G/DL (ref 32–36)
MCV RBC AUTO: 92 FL (ref 80–100)
MONOCYTES # BLD AUTO: 0.58 X10*3/UL (ref 0.1–1)
MONOCYTES NFR BLD AUTO: 8.7 %
NEUTROPHILS # BLD AUTO: 3.09 X10*3/UL (ref 1.2–7.7)
NEUTROPHILS NFR BLD AUTO: 46.5 %
NRBC BLD-RTO: 0 /100 WBCS (ref 0–0)
PLATELET # BLD AUTO: 353 X10*3/UL (ref 150–450)
POTASSIUM SERPL-SCNC: 4.9 MMOL/L (ref 3.4–5.1)
RBC # BLD AUTO: 4.5 X10*6/UL (ref 4–5.2)
SODIUM SERPL-SCNC: 141 MMOL/L (ref 133–145)
WBC # BLD AUTO: 6.6 X10*3/UL (ref 4.4–11.3)

## 2024-03-30 PROCEDURE — 36415 COLL VENOUS BLD VENIPUNCTURE: CPT

## 2024-03-30 PROCEDURE — 85025 COMPLETE CBC W/AUTO DIFF WBC: CPT

## 2024-03-30 PROCEDURE — 80048 BASIC METABOLIC PNL TOTAL CA: CPT

## 2024-03-31 ENCOUNTER — APPOINTMENT (OUTPATIENT)
Dept: RADIOLOGY | Facility: HOSPITAL | Age: 39
End: 2024-03-31
Payer: COMMERCIAL

## 2024-03-31 ENCOUNTER — APPOINTMENT (OUTPATIENT)
Dept: CARDIOLOGY | Facility: HOSPITAL | Age: 39
End: 2024-03-31
Payer: COMMERCIAL

## 2024-03-31 ENCOUNTER — HOSPITAL ENCOUNTER (EMERGENCY)
Facility: HOSPITAL | Age: 39
Discharge: HOME | End: 2024-03-31
Attending: EMERGENCY MEDICINE
Payer: COMMERCIAL

## 2024-03-31 VITALS
HEART RATE: 88 BPM | TEMPERATURE: 97.2 F | DIASTOLIC BLOOD PRESSURE: 78 MMHG | OXYGEN SATURATION: 98 % | SYSTOLIC BLOOD PRESSURE: 140 MMHG | WEIGHT: 172 LBS | HEIGHT: 64 IN | BODY MASS INDEX: 29.37 KG/M2 | RESPIRATION RATE: 18 BRPM

## 2024-03-31 DIAGNOSIS — R10.9 ABDOMINAL PAIN, UNSPECIFIED ABDOMINAL LOCATION: Primary | ICD-10-CM

## 2024-03-31 LAB
ALBUMIN SERPL-MCNC: 4.5 G/DL (ref 3.5–5)
ALP BLD-CCNC: 68 U/L (ref 35–125)
ALT SERPL-CCNC: 20 U/L (ref 5–40)
ANION GAP SERPL CALC-SCNC: 8 MMOL/L
APPEARANCE UR: CLEAR
AST SERPL-CCNC: 16 U/L (ref 5–40)
BILIRUB SERPL-MCNC: <0.2 MG/DL (ref 0.1–1.2)
BILIRUB UR STRIP.AUTO-MCNC: NEGATIVE MG/DL
BUN SERPL-MCNC: 12 MG/DL (ref 8–25)
CALCIUM SERPL-MCNC: 9.2 MG/DL (ref 8.5–10.4)
CHLORIDE SERPL-SCNC: 102 MMOL/L (ref 97–107)
CO2 SERPL-SCNC: 27 MMOL/L (ref 24–31)
COLOR UR: NORMAL
CREAT SERPL-MCNC: 0.7 MG/DL (ref 0.4–1.6)
EGFRCR SERPLBLD CKD-EPI 2021: >90 ML/MIN/1.73M*2
ERYTHROCYTE [DISTWIDTH] IN BLOOD BY AUTOMATED COUNT: 13.4 % (ref 11.5–14.5)
GLUCOSE SERPL-MCNC: 117 MG/DL (ref 65–99)
GLUCOSE UR STRIP.AUTO-MCNC: NORMAL MG/DL
HCG UR QL IA.RAPID: NEGATIVE
HCT VFR BLD AUTO: 40 % (ref 36–46)
HGB BLD-MCNC: 12.9 G/DL (ref 12–16)
KETONES UR STRIP.AUTO-MCNC: NEGATIVE MG/DL
LEUKOCYTE ESTERASE UR QL STRIP.AUTO: NEGATIVE
LIPASE SERPL-CCNC: 52 U/L (ref 16–63)
MCH RBC QN AUTO: 28.7 PG (ref 26–34)
MCHC RBC AUTO-ENTMCNC: 32.3 G/DL (ref 32–36)
MCV RBC AUTO: 89 FL (ref 80–100)
MUCOUS THREADS #/AREA URNS AUTO: NORMAL /LPF
NITRITE UR QL STRIP.AUTO: NEGATIVE
NRBC BLD-RTO: 0 /100 WBCS (ref 0–0)
PH UR STRIP.AUTO: 6.5 [PH]
PLATELET # BLD AUTO: 391 X10*3/UL (ref 150–450)
POTASSIUM SERPL-SCNC: 5.5 MMOL/L (ref 3.4–5.1)
PROT SERPL-MCNC: 7.3 G/DL (ref 5.9–7.9)
PROT UR STRIP.AUTO-MCNC: NORMAL MG/DL
RBC # BLD AUTO: 4.49 X10*6/UL (ref 4–5.2)
RBC # UR STRIP.AUTO: NEGATIVE /UL
RBC #/AREA URNS AUTO: NORMAL /HPF
SODIUM SERPL-SCNC: 137 MMOL/L (ref 133–145)
SP GR UR STRIP.AUTO: 1.02
UROBILINOGEN UR STRIP.AUTO-MCNC: NORMAL MG/DL
WBC # BLD AUTO: 7.4 X10*3/UL (ref 4.4–11.3)
WBC #/AREA URNS AUTO: NORMAL /HPF

## 2024-03-31 PROCEDURE — 84075 ASSAY ALKALINE PHOSPHATASE: CPT | Performed by: NURSE PRACTITIONER

## 2024-03-31 PROCEDURE — 36415 COLL VENOUS BLD VENIPUNCTURE: CPT | Performed by: NURSE PRACTITIONER

## 2024-03-31 PROCEDURE — 2500000001 HC RX 250 WO HCPCS SELF ADMINISTERED DRUGS (ALT 637 FOR MEDICARE OP): Performed by: EMERGENCY MEDICINE

## 2024-03-31 PROCEDURE — 96375 TX/PRO/DX INJ NEW DRUG ADDON: CPT | Performed by: EMERGENCY MEDICINE

## 2024-03-31 PROCEDURE — 96361 HYDRATE IV INFUSION ADD-ON: CPT | Performed by: EMERGENCY MEDICINE

## 2024-03-31 PROCEDURE — 2500000004 HC RX 250 GENERAL PHARMACY W/ HCPCS (ALT 636 FOR OP/ED): Performed by: NURSE PRACTITIONER

## 2024-03-31 PROCEDURE — 81025 URINE PREGNANCY TEST: CPT | Performed by: NURSE PRACTITIONER

## 2024-03-31 PROCEDURE — 81001 URINALYSIS AUTO W/SCOPE: CPT | Performed by: NURSE PRACTITIONER

## 2024-03-31 PROCEDURE — 93005 ELECTROCARDIOGRAM TRACING: CPT

## 2024-03-31 PROCEDURE — 96374 THER/PROPH/DIAG INJ IV PUSH: CPT | Performed by: EMERGENCY MEDICINE

## 2024-03-31 PROCEDURE — 99285 EMERGENCY DEPT VISIT HI MDM: CPT | Mod: 25 | Performed by: EMERGENCY MEDICINE

## 2024-03-31 PROCEDURE — 74177 CT ABD & PELVIS W/CONTRAST: CPT

## 2024-03-31 PROCEDURE — 2550000001 HC RX 255 CONTRASTS: Performed by: EMERGENCY MEDICINE

## 2024-03-31 PROCEDURE — 74177 CT ABD & PELVIS W/CONTRAST: CPT | Performed by: RADIOLOGY

## 2024-03-31 PROCEDURE — 83690 ASSAY OF LIPASE: CPT | Performed by: NURSE PRACTITIONER

## 2024-03-31 PROCEDURE — 85027 COMPLETE CBC AUTOMATED: CPT | Performed by: NURSE PRACTITIONER

## 2024-03-31 RX ORDER — OXYCODONE AND ACETAMINOPHEN 5; 325 MG/1; MG/1
1 TABLET ORAL EVERY 8 HOURS PRN
Qty: 6 TABLET | Refills: 0 | Status: SHIPPED | OUTPATIENT
Start: 2024-03-31 | End: 2024-04-30 | Stop reason: ALTCHOICE

## 2024-03-31 RX ORDER — SODIUM POLYSTYRENE SULFONATE 15 G/60ML
15 SUSPENSION ORAL; RECTAL ONCE
Status: COMPLETED | OUTPATIENT
Start: 2024-03-31 | End: 2024-03-31

## 2024-03-31 RX ORDER — MORPHINE SULFATE 4 MG/ML
4 INJECTION, SOLUTION INTRAMUSCULAR; INTRAVENOUS ONCE
Status: COMPLETED | OUTPATIENT
Start: 2024-03-31 | End: 2024-03-31

## 2024-03-31 RX ORDER — ONDANSETRON HYDROCHLORIDE 2 MG/ML
4 INJECTION, SOLUTION INTRAVENOUS ONCE
Status: COMPLETED | OUTPATIENT
Start: 2024-03-31 | End: 2024-03-31

## 2024-03-31 RX ADMIN — SODIUM POLYSTYRENE SULFONATE 15 G: 15 SUSPENSION ORAL; RECTAL at 13:58

## 2024-03-31 RX ADMIN — SODIUM CHLORIDE 1000 ML: 900 INJECTION, SOLUTION INTRAVENOUS at 12:24

## 2024-03-31 RX ADMIN — ONDANSETRON 4 MG: 2 INJECTION INTRAMUSCULAR; INTRAVENOUS at 12:22

## 2024-03-31 RX ADMIN — MORPHINE SULFATE 4 MG: 4 INJECTION, SOLUTION INTRAMUSCULAR; INTRAVENOUS at 12:22

## 2024-03-31 RX ADMIN — IOHEXOL 75 ML: 350 INJECTION, SOLUTION INTRAVENOUS at 13:12

## 2024-03-31 ASSESSMENT — PAIN SCALES - GENERAL: PAINLEVEL_OUTOF10: 7

## 2024-03-31 ASSESSMENT — COLUMBIA-SUICIDE SEVERITY RATING SCALE - C-SSRS
2. HAVE YOU ACTUALLY HAD ANY THOUGHTS OF KILLING YOURSELF?: NO
1. IN THE PAST MONTH, HAVE YOU WISHED YOU WERE DEAD OR WISHED YOU COULD GO TO SLEEP AND NOT WAKE UP?: NO
6. HAVE YOU EVER DONE ANYTHING, STARTED TO DO ANYTHING, OR PREPARED TO DO ANYTHING TO END YOUR LIFE?: NO

## 2024-03-31 ASSESSMENT — PAIN - FUNCTIONAL ASSESSMENT: PAIN_FUNCTIONAL_ASSESSMENT: 0-10

## 2024-03-31 NOTE — ED PROVIDER NOTES
HPI   Chief Complaint   Patient presents with    Abdominal Pain       HPI  See my MDM                  Welda Coma Scale Score: 15                     Patient History   Past Medical History:   Diagnosis Date    Other specified diabetes mellitus without complications (CMS/HCC)     Diabetes mellitus of other type without complication    Personal history of other diseases of the circulatory system     History of hypertension    Personal history of other mental and behavioral disorders 07/18/2017    History of anxiety disorder    Unspecified asthma, uncomplicated 01/04/2017    Asthmatic bronchitis     Past Surgical History:   Procedure Laterality Date    CHOLECYSTECTOMY  2023    HYSTERECTOMY  2023    OTHER SURGICAL HISTORY  06/09/2021    Gastric bypass surgery    OTHER SURGICAL HISTORY  03/2024     Family History   Problem Relation Name Age of Onset    Heart disease Mother      Cancer Father       Social History     Tobacco Use    Smoking status: Former     Types: Cigarettes    Smokeless tobacco: Never   Vaping Use    Vaping Use: Never used   Substance Use Topics    Alcohol use: Never    Drug use: Never       Physical Exam   ED Triage Vitals [03/31/24 1154]   Temperature Heart Rate Respirations BP   36.2 °C (97.2 °F) 77 16 (!) 149/97      Pulse Ox Temp Source Heart Rate Source Patient Position   100 % Temporal Monitor Sitting      BP Location FiO2 (%)     Left arm --       Physical Exam  CONSTITUTIONAL: Vital signs reviewed as charted, well-developed and in no distress  Eyes: Extraocular muscles are intact. Pupils equal round and reactive to light. Conjunctiva are pink.    ENT: Mucous membranes are moist. Tongue in the midline. Pharynx was without erythema or exudates, uvula midline  LUNGS: Breath sounds equal and clear to auscultation. Good air exchange, no wheezes rales or retractions, pulse oximetry is charted.  HEART: Regular rate and rhythm without murmur thrill or rub, strong tones, auscultation is  "normal.  ABDOMEN: Tenderness in the left lower quadrant, minimal guarding, no rebound tenderness noted.  Abdomen soft.  Neuro: The patient is awake, alert and oriented ×3. Moving all 4 extremities and answering questions appropriately.   MUSCULOSKELETAL: The calves are nontender to palpation. Full gross active range of motion.   PSYCH: Awake alert oriented, normal mood and affect.  Skin:  Dry, normal color, warm to the touch, no rash present.      ED Course & MDM   Diagnoses as of 03/31/24 1544   Abdominal pain, unspecified abdominal location       Medical Decision Making  History obtained from: patient    Vital signs, nursing notes, current medications, past medical history, Surgical history, allergies, social history, family History were reviewed.         HPI:  Patient 38-year-old female presenting emergency room today 16 days status post exploratory laparoscopic surgery with lysis of adhesions by Dr. Josep Flores.  She reportedly also had a deep gastric ulcer as well at that time.  She states the pain is worsened over the last day or 2.  No nausea vomiting diarrhea constipation.  States her bowel movements are very abnormal and hard to determine constipation versus diarrhea.  She denies dizziness, chest pain, shortness of breath or lower extremity edema.      10 point ROS was reviewed and negative except Noted above in HPI.  DDX: as listed above          MDM Summary/considerations:  EMERGENCY DEPARTMENT COURSE and DIFFERENTIAL DIAGNOSIS/MDM:        The patient presented with a chief complaint of abdominal pain left lower quadrant. The differential diagnosis associated with this patient's presentation includes diverticulitis, obstruction, viral syndrome, gastritis, UTI.       Vitals:    Vitals:    03/31/24 1154   BP: (!) 149/97   BP Location: Left arm   Patient Position: Sitting   Pulse: 77   Resp: 16   Temp: 36.2 °C (97.2 °F)   TempSrc: Temporal   SpO2: 100%   Weight: 78 kg (172 lb)   Height: 1.626 m (5' 4\") "       Diagnoses as of 03/31/24 1544   Abdominal pain, unspecified abdominal location       History Limited by:    None    Independent history obtained from:    None      External records reviewed:    Notes from previous hospitalization 2 weeks ago including surgical note, EGD note      Diagnostics interpreted by me:    CT Scan(s) of the abdomen pelvis      Discussions with other clinicians:    Her surgeon Dr. Kerns.Kayce      Chronic conditions impacting care:    None      Social determinants of health affecting care:    None    Diagnostic tests considered but not performed: none    ED Medications managed:    Medications   morphine injection 4 mg (4 mg intravenous Given 3/31/24 1222)   ondansetron (Zofran) injection 4 mg (4 mg intravenous Given 3/31/24 1222)   sodium chloride 0.9 % bolus 1,000 mL (0 mL intravenous Stopped 3/31/24 1324)   iohexol (OMNIPaque) 350 mg iodine/mL solution 75 mL (75 mL intravenous Given 3/31/24 1312)   sodium polystyrene (Kayexalate) suspension 15 g (15 g oral Given 3/31/24 1358)         Prescription drugs considered:    Pain Medications oxycodone    Labs Reviewed   COMPREHENSIVE METABOLIC PANEL - Abnormal       Result Value    Glucose 117 (*)     Sodium 137      Potassium 5.5 (*)     Chloride 102      Bicarbonate 27      Urea Nitrogen 12      Creatinine 0.70      eGFR >90      Calcium 9.2      Albumin 4.5      Alkaline Phosphatase 68      Total Protein 7.3      AST 16      Bilirubin, Total <0.2      ALT 20      Anion Gap 8     URINALYSIS WITH REFLEX MICROSCOPIC - Normal    Color, Urine Light-Yellow      Appearance, Urine Clear      Specific Gravity, Urine 1.016      pH, Urine 6.5      Protein, Urine 20 (TRACE)      Glucose, Urine Normal      Blood, Urine NEGATIVE      Ketones, Urine NEGATIVE      Bilirubin, Urine NEGATIVE      Urobilinogen, Urine Normal      Nitrite, Urine NEGATIVE      Leukocyte Esterase, Urine NEGATIVE     LIPASE - Normal    Lipase 52     CBC - Normal    WBC 7.4      nRBC  0.0      RBC 4.49      Hemoglobin 12.9      Hematocrit 40.0      MCV 89      MCH 28.7      MCHC 32.3      RDW 13.4      Platelets 391     HCG, URINE, QUALITATIVE - Normal    HCG, Urine NEGATIVE     MICROSCOPIC ONLY, URINE    WBC, Urine NONE      RBC, Urine 1-2      Mucus, Urine FEW       CT abdomen pelvis w IV contrast   Final Result   Similar appearance of swirling in the left upper quadrant mesentery,   with decreased degree of stranding and venous congestion. No adverse   interval change.        Signed by: Carmencita Lozano 3/31/2024 1:41 PM   Dictation workstation:   [id number]        Medications   morphine injection 4 mg (4 mg intravenous Given 3/31/24 1222)   ondansetron (Zofran) injection 4 mg (4 mg intravenous Given 3/31/24 1222)   sodium chloride 0.9 % bolus 1,000 mL (0 mL intravenous Stopped 3/31/24 1324)   iohexol (OMNIPaque) 350 mg iodine/mL solution 75 mL (75 mL intravenous Given 3/31/24 1312)   sodium polystyrene (Kayexalate) suspension 15 g (15 g oral Given 3/31/24 1358)     New Prescriptions    OXYCODONE-ACETAMINOPHEN (PERCOCET) 5-325 MG TABLET    Take 1 tablet by mouth every 8 hours if needed for severe pain (7 - 10).     I estimate there is a low risk for acute appendicitis, bowel extraction, cystitis, diverticulitis, incarcerated hernia, mesenteric ischemia, pancreatitis, or perforated bowel or ulcer, thus I considered the discharge disposition reasonable. There is no evidence of peritonitis, sepsis, or toxicity. I have discussed the diagnosis and risks, and we agreed with discharging home to follow-up with the primary care doctor. We also discussed returning to the emergency department immediately if new or worsening symptoms occur. Symptoms of most concern that we discussed are bloody stool, fever, changing or worsening pain, or vomiting that necessitates immediate return.    Patient's laboratory assessment CT scans discussed with her surgeon  Dr.Ben Devries  Recommendations are if her pain is  under control she can go home and you will follow-up in the office if it is not observe her overnight to the hospitalist.  Exam discussed with the patient she does feel comfortable and safe going home.  She will follow with  With her surgeon for reevaluation.  Was discharged home in stable condition.    All of the patient's questions were answered to the best of my ability.  Patient states understanding that they have been screened for an emergency today and we have not found any etiology of symptoms that requires emergent treatment or admission to the hospital at this point. They understand that they have not had definitive care day and require follow-up for treatment of their condition. They also state understanding that they may have an emergent condition that may potentially have not of detected at this visit and they must return to the emergency department if they develop any worsening of symptoms or new complaints.      I saw this patient in conjunction with Dr. Jolly, please see her supervision note.          Critical Care: Not warranted at this time        This chart was completed using voice recognition transcription software. Please excuse any errors of transcription including grammatical, punctuation, syntax and spelling errors.  Please contact me with any questions regarding this chart.    Procedure  Procedures     MARLON Lund-CNP  03/31/24 1544       MARLON Lund-CNP  03/31/24 1544

## 2024-03-31 NOTE — ED TRIAGE NOTES
Abd pain lower left pain - Aox4, no blood in stool, pain on urination, 2/15/24 exploratory surgery for fluid in abd.

## 2024-03-31 NOTE — PROGRESS NOTES
Attestation/Supervisory note for CHRIS Deepti      The patient is a 38-year-old female presenting to the emergency department for evaluation of persistent abdominal pain.  She states that she has had chronic symptoms but they worsened about 2 to 3 weeks ago.  Pain is worse in the left lower quadrant.  No specific better or worse.  No radiation.  She states that she was seen in the emergency room and ultimately had an exploratory laparoscopy on 15 March 2024 by her bariatric surgeon, Dr. Josep Capellan, for her symptoms.  She states that she was found to have a perforated ulcer but they could not find where the free fluid was coming from in her abdomen.  She did have a gastric bypass in 2021.  She denies any headache or visual changes.  No chest pain or shortness of breath.  No back pain.  No urinary complaints.  No vaginal discharge.  No diarrhea or constipation.  All pertinent positives and negatives are recorded above.  All other systems reviewed and otherwise negative.  Vital signs with hypertension but otherwise within normal limits.  Physical exam with a well-nourished well-developed female in no acute distress.  HEENT exam within normal limits.  She has no evidence of airway compromise or respiratory distress.  She has diffuse abdominal tenderness to palpation.  No rebound or guarding.  No palpable mass.  No flank pain with percussion or palpation.  She has no gross motor, neurologic or vascular deficits on exam.      EKG with sinus bradycardia at 59 bpm, borderline low voltage, normal axis, normal ST segment, normal T waves      IV fluids, IV Zofran and IV morphine ordered.      Diagnostic labs with mild hyperkalemia but otherwise unremarkable.      CT abdomen pelvis w IV contrast   Final Result   Similar appearance of swirling in the left upper quadrant mesentery,   with decreased degree of stranding and venous congestion. No adverse   interval change.        Signed by: Carmencita Lozano 3/31/2024 1:41 PM   Dictation  workstation:   [id number]           There is no acute process on the CT imaging.  Diagnostic labs with mild hyperkalemia but otherwise unremarkable.  Oral Kayexalate was ordered      A call was placed to the patient surgeon, Dr. Josep Capellan, the return call was pending at the time of my departure.      CHRIS Tatum will continue to manage the patient primarily.  Anticipate disposition based on the recommendations of her surgeon.      Impression/diagnosis  Abdominal pain, acute on chronic  Hypertension, unspecified  Electrolyte imbalance with hyperkalemia      I personally saw the patient and made/approve the management plan and take responsibility for the patient management.      I independently interpreted the following study (S): Diagnostic labs and EKG      I personally discussed the patient's management with the patient      I reviewed the results of the diagnostic labs and diagnostic imaging.  Formal radiology read was completed by the radiologist.      Tess Magdaleno MD

## 2024-04-01 ENCOUNTER — DOCUMENTATION (OUTPATIENT)
Dept: SURGERY | Facility: CLINIC | Age: 39
End: 2024-04-01

## 2024-04-01 ENCOUNTER — OFFICE VISIT (OUTPATIENT)
Dept: SURGERY | Facility: CLINIC | Age: 39
End: 2024-04-01
Payer: COMMERCIAL

## 2024-04-01 VITALS
SYSTOLIC BLOOD PRESSURE: 151 MMHG | HEIGHT: 64 IN | WEIGHT: 172 LBS | HEART RATE: 60 BPM | BODY MASS INDEX: 29.37 KG/M2 | DIASTOLIC BLOOD PRESSURE: 85 MMHG

## 2024-04-01 DIAGNOSIS — K21.9 GASTROESOPHAGEAL REFLUX DISEASE, UNSPECIFIED WHETHER ESOPHAGITIS PRESENT: Primary | ICD-10-CM

## 2024-04-01 DIAGNOSIS — K21.9 GASTROESOPHAGEAL REFLUX DISEASE WITHOUT ESOPHAGITIS: Primary | ICD-10-CM

## 2024-04-01 LAB
ATRIAL RATE: 59 BPM
P AXIS: 45 DEGREES
P OFFSET: 195 MS
P ONSET: 146 MS
PR INTERVAL: 148 MS
Q ONSET: 220 MS
QRS COUNT: 9 BEATS
QRS DURATION: 82 MS
QT INTERVAL: 410 MS
QTC CALCULATION(BAZETT): 405 MS
QTC FREDERICIA: 407 MS
R AXIS: 15 DEGREES
T AXIS: 36 DEGREES
T OFFSET: 425 MS
VENTRICULAR RATE: 59 BPM

## 2024-04-01 PROCEDURE — 99024 POSTOP FOLLOW-UP VISIT: CPT

## 2024-04-01 RX ORDER — SUCRALFATE 1 G/10ML
1 SUSPENSION ORAL 4 TIMES DAILY
Qty: 1200 ML | Refills: 3 | Status: SHIPPED | OUTPATIENT
Start: 2024-04-01 | End: 2024-07-30

## 2024-04-01 ASSESSMENT — PAIN SCALES - GENERAL: PAINLEVEL: 6

## 2024-04-01 NOTE — PROGRESS NOTES
Subjective   Patient ID: Dot Breen is a 38 y.o. female who presents for No chief complaint on file..  HPI  Dot comes in today because she felt a lump on her side her an incision. Unrelated, she went to ER yesterday for LLQ pain. No etiology could be found. She was given pain medication and sent home. On a prior ER visit, she was found to have constipation, since then she has been taking Miralax. She was taking it BID, and has recently decreased to once daily. She tells me that she made an apt with [facility]- Dr Baxter for the LLQ pain because she is unsure what else to do.       Objective   Physical Exam  Constitutional:       Appearance: Normal appearance.   Eyes:      Pupils: Pupils are equal, round, and reactive to light.   Cardiovascular:      Rate and Rhythm: Normal rate.   Pulmonary:      Effort: Pulmonary effort is normal.   Abdominal:      Palpations: Abdomen is soft. There is mass.      Comments: Small, pea like mass under incisional scar on R side, moveable, nontender   Musculoskeletal:         General: Normal range of motion.   Skin:     General: Skin is warm and dry.   Neurological:      General: No focal deficit present.      Mental Status: She is alert and oriented to person, place, and time.   Psychiatric:         Mood and Affect: Mood normal.         Assessment/Plan   Problem List Items Addressed This Visit             ICD-10-CM    Gastroesophageal reflux disease without esophagitis - Primary K21.9     We discussed that if the nodule under her incision grows, or becomes tender- to call the office and we will get an US of the area. Dot is in agreement of this plan. I also encouraged her to go to pelvic floor PT to see if that would help relieve some of her lower abdominal discomfort that started after her hysterectomy. Earlier today, I had called her and ordered Carafate slurry. I asked her to call the office by the end of the week to let me know if this was helping with her abdominal  discomfort. She has a repeat EGD with Dr Josep Capellan in May.        Katie Yen, MARLON-CNP 04/01/24 4:26 PM

## 2024-04-01 NOTE — PROGRESS NOTES
CHECK BRUISE/ LUMP  START WT: 203 IDEAL WT: 140 START EXCESS WT: 63  WENT TO ER ON 03/31/24 [facility] FOR PAIN TO LEFT LOWER ABDOMEN AND THY PAGED DR. NEWMAN, SHE HAD PAIN MEDS ORDERED. THEY DID A CT SCAN THEN SHE WAS DISCHARGED.   SHE IS TAKING 60 OZ OF FLUID A DAY  TAKES ZOFRAN WITH THE PAIN MEDICATION

## 2024-04-04 ENCOUNTER — APPOINTMENT (OUTPATIENT)
Dept: GASTROENTEROLOGY | Facility: HOSPITAL | Age: 39
End: 2024-04-04
Payer: COMMERCIAL

## 2024-04-04 DIAGNOSIS — K21.9 GASTRO-ESOPHAGEAL REFLUX DISEASE WITHOUT ESOPHAGITIS: ICD-10-CM

## 2024-04-05 ENCOUNTER — APPOINTMENT (OUTPATIENT)
Dept: RADIOLOGY | Facility: HOSPITAL | Age: 39
End: 2024-04-05
Payer: COMMERCIAL

## 2024-04-18 ENCOUNTER — OFFICE VISIT (OUTPATIENT)
Dept: SURGERY | Facility: CLINIC | Age: 39
End: 2024-04-18
Payer: COMMERCIAL

## 2024-04-18 VITALS
HEART RATE: 69 BPM | BODY MASS INDEX: 29.53 KG/M2 | SYSTOLIC BLOOD PRESSURE: 137 MMHG | WEIGHT: 173 LBS | HEIGHT: 64 IN | DIASTOLIC BLOOD PRESSURE: 75 MMHG

## 2024-04-18 DIAGNOSIS — E21.3 HYPERPARATHYROIDISM (MULTI): ICD-10-CM

## 2024-04-18 DIAGNOSIS — Z09 SURGERY FOLLOW-UP EXAMINATION: Primary | ICD-10-CM

## 2024-04-18 DIAGNOSIS — K90.9 INTESTINAL MALABSORPTION, UNSPECIFIED TYPE (HHS-HCC): ICD-10-CM

## 2024-04-18 DIAGNOSIS — E55.9 VITAMIN D DEFICIENCY: ICD-10-CM

## 2024-04-18 DIAGNOSIS — E53.8 VITAMIN B12 DEFICIENCY: ICD-10-CM

## 2024-04-18 PROCEDURE — 1036F TOBACCO NON-USER: CPT

## 2024-04-18 PROCEDURE — 99024 POSTOP FOLLOW-UP VISIT: CPT

## 2024-04-18 ASSESSMENT — PAIN SCALES - GENERAL: PAINLEVEL: 4

## 2024-04-18 NOTE — PROGRESS NOTES
GENERAL SURGERY POST OP VISIT   START WT:  203     IDEAL WT: 140 START EXCESS:63 TWL: 30 EWL: 48 % HT: 64 IN  ADMITS TO ANXIETY AND HEADACHES, FATIGUE AND ABDOMINAL PAIN

## 2024-04-18 NOTE — PROGRESS NOTES
Subjective   Patient ID: Dot Breen is a 38 y.o. female who presents for No chief complaint on file..  HPI  Dot comes in today for follow up after her lysis of adhesions. She tells me that she feels ok. She is currently doing some pelvic floor exercises at home that she found online. She is taking both omeprazole and sucralfate BID. She is also taking a MVI and calcium daily.       Objective   Physical Exam  Constitutional:       General: She is not in acute distress.     Appearance: Normal appearance. She is obese.   HENT:      Head: Normocephalic.   Eyes:      Pupils: Pupils are equal, round, and reactive to light.   Cardiovascular:      Rate and Rhythm: Normal rate and regular rhythm.   Pulmonary:      Effort: Pulmonary effort is normal.   Abdominal:      General: There is no distension.      Palpations: Abdomen is soft.      Comments: Appropriately TTP around incisions, incisions CDI    Musculoskeletal:         General: Normal range of motion.   Skin:     General: Skin is warm and dry.   Neurological:      Mental Status: She is alert and oriented to person, place, and time.   Psychiatric:         Mood and Affect: Mood normal.         Assessment/Plan   Problem List Items Addressed This Visit             ICD-10-CM    Surgery follow-up examination - Primary Z09     Other Visit Diagnoses         Codes    Intestinal malabsorption, unspecified type ([facility])     K90.9    Relevant Orders    CBC and Auto Differential    Comprehensive Metabolic Panel    Copper, Blood    Ferritin    Iron and TIBC    Folate    Parathyroid Hormone, Intact    Vitamin B1, Whole Blood    Vitamin B12    Vitamin D 25-Hydroxy,Total (for eval of Vitamin D levels)    Zinc, Serum or Plasma    Vitamin D deficiency     E55.9    Relevant Orders    Vitamin D 25-Hydroxy,Total (for eval of Vitamin D levels)    Vitamin B12 deficiency     E53.8    Relevant Orders    Vitamin B12    Hyperparathyroidism (Multi)     E21.3    Relevant Orders    Parathyroid  Hormone, Intact        Dot will continue her activity restrictions. She has an EGD planned in May with Dr Kulkarni. We will follow up in 3 months with a full bariatric lab panel. She will continue to take her omeprazole and sucralfate BID.          Katie Yen, MARLON-CNP 04/18/24 1:03 PM

## 2024-04-27 RX ORDER — ONDANSETRON HYDROCHLORIDE 2 MG/ML
4 INJECTION, SOLUTION INTRAVENOUS ONCE AS NEEDED
Status: CANCELLED | OUTPATIENT
Start: 2024-04-27

## 2024-04-27 RX ORDER — NALOXONE HYDROCHLORIDE 0.4 MG/ML
0.2 INJECTION, SOLUTION INTRAMUSCULAR; INTRAVENOUS; SUBCUTANEOUS EVERY 5 MIN PRN
Status: CANCELLED | OUTPATIENT
Start: 2024-04-27

## 2024-04-27 RX ORDER — FLUMAZENIL 0.1 MG/ML
0.2 INJECTION INTRAVENOUS ONCE AS NEEDED
Status: CANCELLED | OUTPATIENT
Start: 2024-04-27

## 2024-04-29 ENCOUNTER — PRE-ADMISSION TESTING (OUTPATIENT)
Dept: PREADMISSION TESTING | Facility: HOSPITAL | Age: 39
End: 2024-04-29
Payer: COMMERCIAL

## 2024-04-29 VITALS
OXYGEN SATURATION: 100 % | BODY MASS INDEX: 29.98 KG/M2 | SYSTOLIC BLOOD PRESSURE: 130 MMHG | HEART RATE: 69 BPM | DIASTOLIC BLOOD PRESSURE: 72 MMHG | HEIGHT: 64 IN | RESPIRATION RATE: 18 BRPM | WEIGHT: 175.6 LBS | TEMPERATURE: 97.5 F

## 2024-04-29 PROCEDURE — 99203 OFFICE O/P NEW LOW 30 MIN: CPT | Performed by: NURSE PRACTITIONER

## 2024-04-29 RX ORDER — ACETAMINOPHEN 500 MG
1000 TABLET ORAL EVERY 6 HOURS PRN
COMMUNITY

## 2024-04-29 ASSESSMENT — DUKE ACTIVITY SCORE INDEX (DASI)
CAN YOU PARTICIPATE IN STRENOUS SPORTS LIKE SWIMMING, SINGLES TENNIS, FOOTBALL, BASKETBALL, OR SKIING: NO
CAN YOU DO MODERATE WORK AROUND THE HOUSE LIKE VACUUMING, SWEEPING FLOORS OR CARRYING GROCERIES: YES
CAN YOU WALK A BLOCK OR TWO ON LEVEL GROUND: YES
CAN YOU TAKE CARE OF YOURSELF (EAT, DRESS, BATHE, OR USE TOILET): YES
CAN YOU DO LIGHT WORK AROUND THE HOUSE LIKE DUSTING OR WASHING DISHES: YES
DASI METS SCORE: 8.2
TOTAL_SCORE: 44.7
CAN YOU RUN A SHORT DISTANCE: YES
CAN YOU DO YARD WORK LIKE RAKING LEAVES, WEEDING OR PUSHING A MOWER: YES
CAN YOU PARTICIPATE IN MODERATE RECREATIONAL ACTIVITIES LIKE GOLF, BOWLING, DANCING, DOUBLES TENNIS OR THROWING A BASEBALL OR FOOTBALL: NO
CAN YOU WALK INDOORS, SUCH AS AROUND YOUR HOUSE: YES
CAN YOU CLIMB A FLIGHT OF STAIRS OR WALK UP A HILL: YES
CAN YOU DO HEAVY WORK AROUND THE HOUSE LIKE SCRUBBING FLOORS OR LIFTING AND MOVING HEAVY FURNITURE: YES
CAN YOU HAVE SEXUAL RELATIONS: YES

## 2024-04-29 ASSESSMENT — ENCOUNTER SYMPTOMS
ABDOMINAL PAIN: 1
EYES NEGATIVE: 1
NEUROLOGICAL NEGATIVE: 1
RESPIRATORY NEGATIVE: 1
MUSCULOSKELETAL NEGATIVE: 1
PSYCHIATRIC NEGATIVE: 1
DIARRHEA: 1
CONSTITUTIONAL NEGATIVE: 1

## 2024-04-29 ASSESSMENT — PAIN SCALES - GENERAL: PAINLEVEL_OUTOF10: 0 - NO PAIN

## 2024-04-29 ASSESSMENT — PAIN - FUNCTIONAL ASSESSMENT: PAIN_FUNCTIONAL_ASSESSMENT: 0-10

## 2024-04-29 NOTE — CPM/PAT H&P
CPM/PAT Evaluation       Name: Dot Breen (Dot Breen)  /Age: 1985/38 y.o.     In-Person       Chief Complaint: History of abdominal pain, ulcer with perigastric inflammation    HPI  A 38-year-old female with history of abdominal pain, ulcer with perigastric inflammation.  Past surgical history significant for RYGB in , hysterectomy, and cholecystectomy.  Patient was hospitalized 3/13/2024 with abdominal pain, severe constipation, bloody bowel movement and abnormal CT. She underwent diagnostic laparoscopy with lysis of adhesions.  Patient reports she has had persistent left sided abdominal pain that recently resolved a week ago.  She is currently taking PPI and is due for follow-up EGD.  She is scheduled for esophagogastroduodenoscopy (EGD).    Past Medical History:   Diagnosis Date    HTN (hypertension)     Other specified diabetes mellitus without complications (Multi)     Diabetes mellitus of other type without complication    Personal history of other diseases of the circulatory system     History of hypertension    Personal history of other mental and behavioral disorders 2017    History of anxiety disorder    Unspecified asthma, uncomplicated ([facility]) 2017    Asthmatic bronchitis       Past Surgical History:   Procedure Laterality Date    CHOLECYSTECTOMY      HYSTERECTOMY      OTHER SURGICAL HISTORY  2021    Gastric bypass surgery    OTHER SURGICAL HISTORY  2024    OTHER SURGICAL HISTORY      LAPAROSCOPIC LYSIS OF ADHESIONS/ ESOPHAGOGASTRODUODENOSCOPY  (PATY)         Allergies   Allergen Reactions    Vilazodone Rash    Trazodone Rash       Current Outpatient Medications   Medication Sig Dispense Refill    acetaminophen (Tylenol) 500 mg tablet Take 2 tablets (1,000 mg) by mouth every 6 hours if needed for mild pain (1 - 3).      bisoprolol (Zebeta) 5 mg tablet Take 1 tablet (5 mg) by mouth once daily. Takes 1/2 daily      levocetirizine (Xyzal) 5 mg tablet  Take 1 tablet (5 mg) by mouth once daily at bedtime.      omeprazole (PriLOSEC) 40 mg DR capsule Take 1 capsule (40 mg) by mouth 2 times a day before meals. Do not crush or chew. 180 capsule 3    ondansetron (Zofran) 4 mg tablet Take 1 tablet (4 mg) by mouth every 8 hours if needed for nausea or vomiting. 20 tablet 0    oxyCODONE-acetaminophen (Percocet) 5-325 mg tablet Take 1 tablet by mouth every 8 hours if needed for severe pain (7 - 10). (Patient not taking: Reported on 4/18/2024) 6 tablet 0    polyethylene glycol (Glycolax, Miralax) 17 gram packet Take 17 g by mouth once daily.      rifAXIMin (Xifaxan) 550 mg tablet Take 1 tablet (550 mg) by mouth 3 times a day.      sucralfate (Carafate) 100 mg/mL suspension Take 10 mL (1 g) by mouth 4 times a day. 1200 mL 3     No current facility-administered medications for this visit.       Review of Systems   Constitutional: Negative.    HENT: Negative.     Eyes: Negative.         Glasses   Respiratory: Negative.     Cardiovascular:         History of palpitations follows with Dr. Castaneda   Gastrointestinal:  Positive for abdominal pain and diarrhea.        Reports chronic diarrhea and dysphagia with foods   Genitourinary: Negative.    Musculoskeletal: Negative.    Skin:  Positive for rash (Recent bilateral ankle skin rash).   Neurological: Negative.    Psychiatric/Behavioral: Negative.          Physical Exam  Vitals reviewed.   HENT:      Head: Normocephalic and atraumatic.      Mouth/Throat:      Mouth: Mucous membranes are moist.   Eyes:      Pupils: Pupils are equal, round, and reactive to light.      Comments: Glasses   Cardiovascular:      Rate and Rhythm: Normal rate and regular rhythm.      Pulses: Normal pulses.      Heart sounds: Normal heart sounds.   Pulmonary:      Breath sounds: Normal breath sounds.   Abdominal:      Palpations: Abdomen is soft.   Musculoskeletal:         General: Normal range of motion.      Cervical back: Normal range of motion.   Skin:      "General: Skin is warm and dry.      Findings: Rash (Mild bilateral ankle skin rash visible) present.   Neurological:      Mental Status: She is alert and oriented to person, place, and time.   Psychiatric:         Mood and Affect: Mood normal.          PAT AIRWAY:   Airway:     Mallampati::  II    Neck ROM::  Full  normal        /72   Pulse 69   Temp 36.4 °C (97.5 °F) (Oral)   Resp 18   Ht 1.626 m (5' 4.02\")   Wt 79.7 kg (175 lb 9.6 oz)   SpO2 100%   BMI 30.13 kg/m²       Stop Bang Score 2    CHADS 2 score: 1.9%  DASI score: 44.7  METS score: 8.2  Revised cardiac risk index: 0.4%  ASA II  ARISCAT 1.6%  Labs done 3/31/2024 CBC, CMP  EKG done 3/31/2024 in Epic  Assessment and Plan:     History of abdominal pain, ulcer with perigastric inflammation Plan: Esophagogastroduodenoscopy (EGD)  Hypertension  Anxiety  Dysphagia   History of palpitations appointment scheduled with Dr. Castaneda cardiologist 4/30/2024  BMI 29.70  MONA no CPAP since 50lb weight loss        "

## 2024-04-29 NOTE — H&P (VIEW-ONLY)
CPM/PAT Evaluation       Name: Dot Breen (Dot Breen)  /Age: 1985/38 y.o.     In-Person       Chief Complaint: History of abdominal pain, ulcer with perigastric inflammation    HPI  A 38-year-old female with history of abdominal pain, ulcer with perigastric inflammation.  Past surgical history significant for RYGB in , hysterectomy, and cholecystectomy.  Patient was hospitalized 3/13/2024 with abdominal pain, severe constipation, bloody bowel movement and abnormal CT. She underwent diagnostic laparoscopy with lysis of adhesions.  Patient reports she has had persistent left sided abdominal pain that recently resolved a week ago.  She is currently taking PPI and is due for follow-up EGD.  She is scheduled for esophagogastroduodenoscopy (EGD).    Past Medical History:   Diagnosis Date    HTN (hypertension)     Other specified diabetes mellitus without complications (Multi)     Diabetes mellitus of other type without complication    Personal history of other diseases of the circulatory system     History of hypertension    Personal history of other mental and behavioral disorders 2017    History of anxiety disorder    Unspecified asthma, uncomplicated ([facility]) 2017    Asthmatic bronchitis       Past Surgical History:   Procedure Laterality Date    CHOLECYSTECTOMY      HYSTERECTOMY      OTHER SURGICAL HISTORY  2021    Gastric bypass surgery    OTHER SURGICAL HISTORY  2024    OTHER SURGICAL HISTORY      LAPAROSCOPIC LYSIS OF ADHESIONS/ ESOPHAGOGASTRODUODENOSCOPY  (PATY)         Allergies   Allergen Reactions    Vilazodone Rash    Trazodone Rash       Current Outpatient Medications   Medication Sig Dispense Refill    acetaminophen (Tylenol) 500 mg tablet Take 2 tablets (1,000 mg) by mouth every 6 hours if needed for mild pain (1 - 3).      bisoprolol (Zebeta) 5 mg tablet Take 1 tablet (5 mg) by mouth once daily. Takes 1/2 daily      levocetirizine (Xyzal) 5 mg tablet  Take 1 tablet (5 mg) by mouth once daily at bedtime.      omeprazole (PriLOSEC) 40 mg DR capsule Take 1 capsule (40 mg) by mouth 2 times a day before meals. Do not crush or chew. 180 capsule 3    ondansetron (Zofran) 4 mg tablet Take 1 tablet (4 mg) by mouth every 8 hours if needed for nausea or vomiting. 20 tablet 0    oxyCODONE-acetaminophen (Percocet) 5-325 mg tablet Take 1 tablet by mouth every 8 hours if needed for severe pain (7 - 10). (Patient not taking: Reported on 4/18/2024) 6 tablet 0    polyethylene glycol (Glycolax, Miralax) 17 gram packet Take 17 g by mouth once daily.      rifAXIMin (Xifaxan) 550 mg tablet Take 1 tablet (550 mg) by mouth 3 times a day.      sucralfate (Carafate) 100 mg/mL suspension Take 10 mL (1 g) by mouth 4 times a day. 1200 mL 3     No current facility-administered medications for this visit.       Review of Systems   Constitutional: Negative.    HENT: Negative.     Eyes: Negative.         Glasses   Respiratory: Negative.     Cardiovascular:         History of palpitations follows with Dr. Castaneda   Gastrointestinal:  Positive for abdominal pain and diarrhea.        Reports chronic diarrhea and dysphagia with foods   Genitourinary: Negative.    Musculoskeletal: Negative.    Skin:  Positive for rash (Recent bilateral ankle skin rash).   Neurological: Negative.    Psychiatric/Behavioral: Negative.          Physical Exam  Vitals reviewed.   HENT:      Head: Normocephalic and atraumatic.      Mouth/Throat:      Mouth: Mucous membranes are moist.   Eyes:      Pupils: Pupils are equal, round, and reactive to light.      Comments: Glasses   Cardiovascular:      Rate and Rhythm: Normal rate and regular rhythm.      Pulses: Normal pulses.      Heart sounds: Normal heart sounds.   Pulmonary:      Breath sounds: Normal breath sounds.   Abdominal:      Palpations: Abdomen is soft.   Musculoskeletal:         General: Normal range of motion.      Cervical back: Normal range of motion.   Skin:      "General: Skin is warm and dry.      Findings: Rash (Mild bilateral ankle skin rash visible) present.   Neurological:      Mental Status: She is alert and oriented to person, place, and time.   Psychiatric:         Mood and Affect: Mood normal.          PAT AIRWAY:   Airway:     Mallampati::  II    Neck ROM::  Full  normal        /72   Pulse 69   Temp 36.4 °C (97.5 °F) (Oral)   Resp 18   Ht 1.626 m (5' 4.02\")   Wt 79.7 kg (175 lb 9.6 oz)   SpO2 100%   BMI 30.13 kg/m²       Stop Bang Score 2    CHADS 2 score: 1.9%  DASI score: 44.7  METS score: 8.2  Revised cardiac risk index: 0.4%  ASA II  ARISCAT 1.6%  Labs done 3/31/2024 CBC, CMP  EKG done 3/31/2024 in Epic  Assessment and Plan:     History of abdominal pain, ulcer with perigastric inflammation Plan: Esophagogastroduodenoscopy (EGD)  Hypertension  Anxiety  Dysphagia   History of palpitations appointment scheduled with Dr. Castaneda cardiologist 4/30/2024  BMI 29.70  MONA no CPAP since 50lb weight loss        "

## 2024-04-29 NOTE — PREPROCEDURE INSTRUCTIONS
Medication List            Accurate as of April 29, 2024  3:13 PM. Always use your most recent med list.                acetaminophen 500 mg tablet  Commonly known as: Tylenol  Medication Adjustments for Surgery: Take morning of surgery with sip of water, no other fluids     bisoprolol 5 mg tablet  Commonly known as: Zebeta  Medication Adjustments for Surgery: Take morning of surgery with sip of water, no other fluids     levocetirizine 5 mg tablet  Commonly known as: Xyzal  Medication Adjustments for Surgery: Other (Comment)  Notes to patient: HOLD DAY OF SURGERY     omeprazole 40 mg DR capsule  Commonly known as: PriLOSEC  Take 1 capsule (40 mg) by mouth 2 times a day before meals. Do not crush or chew.  Medication Adjustments for Surgery: Take morning of surgery with sip of water, no other fluids     ondansetron 4 mg tablet  Commonly known as: Zofran  Take 1 tablet (4 mg) by mouth every 8 hours if needed for nausea or vomiting.  Medication Adjustments for Surgery: Take morning of surgery with sip of water, no other fluids  Notes to patient: IF NEEDED     oxyCODONE-acetaminophen 5-325 mg tablet  Commonly known as: Percocet  Take 1 tablet by mouth every 8 hours if needed for severe pain (7 - 10).     polyethylene glycol 17 gram packet  Commonly known as: Glycolax, Miralax     rifAXIMin 550 mg tablet  Commonly known as: Xifaxan  Medication Adjustments for Surgery: Take morning of surgery with sip of water, no other fluids     sucralfate 100 mg/mL suspension  Commonly known as: Carafate  Take 10 mL (1 g) by mouth 4 times a day.  Medication Adjustments for Surgery: Take morning of surgery with sip of water, no other fluids                 Preoperative Fasting Guidelines    Why must I stop eating and drinking near surgery time?  With sedation, food or liquid in your stomach can enter your lungs causing serious complications  Increases nausea and vomiting    When do I need to stop eating and drinking before my  surgery?  Do not eat any food or drink any liquids after midnight the night before your surgery/procedure.  You may have sips of water to take medications.    PAT DISCHARGE INSTRUCTIONS    Please call the Same Day Surgery (SDS) Department of the hospital where your procedure will be performed after 2:00 PM the day before your surgery. If you are scheduled on a Monday, or a Tuesday following a Monday holiday, you will need to call on the last business day prior to your surgery.    [facility]  [id number] [place] [address], [address] [phone]    [facility]  [address] [place] [address] [phone]    [facility]  [address] [place] [address]  [phone]    Please let your surgeon know if:      You develop any open sores, shingles, burning or painful urination as these may increase your risk of an infection.   You no longer wish to have the surgery.   Any other personal circumstances change that may lead to the need to cancel or defer this surgery-such as being sick or getting admitted to any hospital within one week of your planned procedure.    Your contact details change, such as a change of address or phone number.    Starting now:     Please DO NOT drink alcohol or smoke for 24 hours before surgery. It is well known that quitting smoking can make a huge difference to your health and recovery from surgery. The longer you abstain from smoking, the better your chances of a healthy recovery. If you need help with quitting, call 5-800-QUIT-NOW to be connected to a trained counselor who will discuss the best methods to help you quit.     Before your surgery:    Please stop all supplements 7 days prior to surgery. Or as directed by your surgeon.   Please stop taking NSAID pain medicine such as Advil and Motrin 7 days before surgery.    If you develop any fever, cough, cold, rashes, cuts,  scratches, scrapes, urinary symptoms or infection anywhere on your body (including teeth and gums) prior to surgery, please call your surgeon’s office as soon as possible. This may require treatment to reduce the chance of cancellation on the day of surgery.    The day before your surgery:   DIET- Please follow the diet instructions at the top of your packet.   Get a good night’s rest.  Use the special soap for bathing if you have been instructed to use one.    Scheduled surgery times may change and you will be notified if this occurs - please check your personal voicemail for any updates.     On the morning of surgery:   Wear comfortable, loose fitting clothes which open in the front. Please do not wear moisturizers, creams, lotions, makeup or perfume.    Please bring with you to surgery:   Photo ID and insurance card   Current list of medicines and allergies   Pacemaker/ Defibrillator/Heart stent cards   CPAP machine and mask    Slings/ splints/ crutches   A copy of your complete advanced directive/DHPOA.    Please do NOT bring with you to surgery:   All jewelry and valuables should be left at home.   Prosthetic devices such as contact lenses, hearing aids, dentures, eyelash extensions, hairpins and body piercings must be removed prior to going in to the surgical suite.    After outpatient surgery:   A responsible adult MUST accompany you at the time of discharge and stay with you for 24 hours after your surgery. You may NOT drive yourself home after surgery.    Do not drive, operate machinery, make critical decisions or do activities that require co-ordination or balance until after a night’s sleep.   Do not drink alcoholic beverages for 24 hours.   Instructions for resuming your medications will be provided by your surgeon.    CALL YOUR DOCTOR AFTER SURGERY IF YOU HAVE:     Chills and/or a fever of 101° F or higher.    Redness, swelling, pus or drainage from your surgical wound or a bad smell from the wound.     Lightheadedness, fainting or confusion.    Persistent vomiting (throwing up) and are not able to eat or drink for 12 hours.    Three or more loose, watery bowel movements in 24 hours (diarrhea).   Difficulty or pain while urinating( after non-urological surgery)    Pain and swelling in your legs, especially if it is only on one side.    Difficulty breathing or are breathing faster than normal.    Any new concerning symptoms.

## 2024-04-30 ENCOUNTER — OFFICE VISIT (OUTPATIENT)
Dept: CARDIOLOGY | Facility: CLINIC | Age: 39
End: 2024-04-30
Payer: COMMERCIAL

## 2024-04-30 ENCOUNTER — OFFICE VISIT (OUTPATIENT)
Dept: PRIMARY CARE | Facility: CLINIC | Age: 39
End: 2024-04-30
Payer: COMMERCIAL

## 2024-04-30 VITALS
WEIGHT: 173 LBS | HEART RATE: 60 BPM | OXYGEN SATURATION: 100 % | BODY MASS INDEX: 29.7 KG/M2 | SYSTOLIC BLOOD PRESSURE: 128 MMHG | DIASTOLIC BLOOD PRESSURE: 88 MMHG | RESPIRATION RATE: 18 BRPM

## 2024-04-30 VITALS
TEMPERATURE: 98.9 F | RESPIRATION RATE: 18 BRPM | HEIGHT: 64 IN | BODY MASS INDEX: 29.53 KG/M2 | HEART RATE: 70 BPM | OXYGEN SATURATION: 98 % | DIASTOLIC BLOOD PRESSURE: 88 MMHG | WEIGHT: 173 LBS | SYSTOLIC BLOOD PRESSURE: 141 MMHG

## 2024-04-30 DIAGNOSIS — I10 PRIMARY HYPERTENSION: ICD-10-CM

## 2024-04-30 DIAGNOSIS — L50.1 ACUTE IDIOPATHIC URTICARIA: ICD-10-CM

## 2024-04-30 DIAGNOSIS — I10 PRIMARY HYPERTENSION: Primary | ICD-10-CM

## 2024-04-30 DIAGNOSIS — R00.2 PALPITATIONS: ICD-10-CM

## 2024-04-30 DIAGNOSIS — L20.89 OTHER ATOPIC DERMATITIS: Primary | ICD-10-CM

## 2024-04-30 PROBLEM — E78.5 HYPERLIPIDEMIA: Status: ACTIVE | Noted: 2024-04-30

## 2024-04-30 PROBLEM — M25.559 HIP PAIN: Status: ACTIVE | Noted: 2024-04-30

## 2024-04-30 PROBLEM — A58: Status: ACTIVE | Noted: 2024-04-30

## 2024-04-30 PROBLEM — R79.89 ELEVATED TSH: Status: ACTIVE | Noted: 2024-04-30

## 2024-04-30 PROBLEM — F43.10 POSTTRAUMATIC STRESS DISORDER: Status: ACTIVE | Noted: 2018-07-02

## 2024-04-30 PROBLEM — G47.33 OBSTRUCTIVE SLEEP APNEA: Status: ACTIVE | Noted: 2024-04-30

## 2024-04-30 PROBLEM — M54.9 BACKACHE: Status: ACTIVE | Noted: 2024-04-30

## 2024-04-30 PROBLEM — R50.9 PYREXIA OF UNKNOWN ORIGIN: Status: ACTIVE | Noted: 2024-04-30

## 2024-04-30 PROBLEM — R10.13 EPIGASTRIC PAIN: Status: ACTIVE | Noted: 2023-03-27

## 2024-04-30 PROBLEM — R41.3 MEMORY IMPAIRMENT: Status: ACTIVE | Noted: 2024-04-30

## 2024-04-30 PROBLEM — K21.9 LARYNGOPHARYNGEAL REFLUX: Status: ACTIVE | Noted: 2023-04-04

## 2024-04-30 PROBLEM — E55.9 VITAMIN D DEFICIENCY: Status: ACTIVE | Noted: 2024-04-30

## 2024-04-30 PROBLEM — K22.10 BARRETT'S ESOPHAGEAL ULCERATION: Status: ACTIVE | Noted: 2024-04-30

## 2024-04-30 PROBLEM — K28.9 JEJUNAL ULCER: Status: ACTIVE | Noted: 2024-04-30

## 2024-04-30 PROBLEM — F51.05 INSOMNIA RELATED TO ANOTHER MENTAL DISORDER: Status: ACTIVE | Noted: 2024-04-30

## 2024-04-30 PROBLEM — K59.00 CONSTIPATION: Status: ACTIVE | Noted: 2023-04-09

## 2024-04-30 PROBLEM — N92.1 METRORRHAGIA: Status: ACTIVE | Noted: 2024-04-30

## 2024-04-30 PROBLEM — R25.3 MUSCLE TWITCH: Status: ACTIVE | Noted: 2024-04-30

## 2024-04-30 PROBLEM — E11.9 DIABETES MELLITUS WITHOUT COMPLICATION (MULTI): Status: ACTIVE | Noted: 2024-04-30

## 2024-04-30 PROBLEM — L98.9 INFLAMMATORY DERMATOSIS: Status: ACTIVE | Noted: 2024-04-30

## 2024-04-30 PROBLEM — R20.2 PARESTHESIA: Status: ACTIVE | Noted: 2024-04-30

## 2024-04-30 PROBLEM — R68.84 JAW PAIN: Status: ACTIVE | Noted: 2024-04-30

## 2024-04-30 PROBLEM — K82.8 BILIARY DYSKINESIA: Status: ACTIVE | Noted: 2023-04-13

## 2024-04-30 PROBLEM — R09.A2 GLOBUS SENSATION: Status: ACTIVE | Noted: 2024-04-30

## 2024-04-30 PROBLEM — K52.9 GASTROENTERITIS: Status: ACTIVE | Noted: 2024-04-30

## 2024-04-30 PROBLEM — R11.0 NAUSEA: Status: ACTIVE | Noted: 2024-04-30

## 2024-04-30 PROBLEM — K90.9 INTESTINAL MALABSORPTION (HHS-HCC): Status: ACTIVE | Noted: 2024-04-30

## 2024-04-30 PROBLEM — S39.012A BACK STRAIN: Status: ACTIVE | Noted: 2024-04-30

## 2024-04-30 PROBLEM — F33.1 MODERATE EPISODE OF RECURRENT MAJOR DEPRESSIVE DISORDER (MULTI): Status: ACTIVE | Noted: 2018-07-02

## 2024-04-30 PROBLEM — N93.9 ABNORMAL VAGINAL BLEEDING: Status: ACTIVE | Noted: 2024-04-30

## 2024-04-30 PROBLEM — S61.219A LACERATION OF FINGER: Status: ACTIVE | Noted: 2024-04-30

## 2024-04-30 PROBLEM — G89.29 CHRONIC PAIN: Status: ACTIVE | Noted: 2024-04-30

## 2024-04-30 PROBLEM — E03.9 ACQUIRED HYPOTHYROIDISM: Status: ACTIVE | Noted: 2024-04-30

## 2024-04-30 PROBLEM — F90.2 ATTENTION DEFICIT HYPERACTIVITY DISORDER (ADHD), COMBINED TYPE: Status: ACTIVE | Noted: 2024-04-30

## 2024-04-30 PROBLEM — D17.23 LIPOMA OF RIGHT LOWER EXTREMITY: Status: ACTIVE | Noted: 2024-04-30

## 2024-04-30 PROBLEM — F41.0 PANIC DISORDER WITHOUT AGORAPHOBIA: Status: ACTIVE | Noted: 2018-07-16

## 2024-04-30 PROBLEM — F31.9 BIPOLAR I DISORDER (MULTI): Status: ACTIVE | Noted: 2024-04-30

## 2024-04-30 PROBLEM — R09.89 THROAT CLEARING: Status: ACTIVE | Noted: 2024-04-30

## 2024-04-30 PROBLEM — H66.90 OTITIS MEDIA: Status: ACTIVE | Noted: 2024-04-30

## 2024-04-30 PROBLEM — N92.6 IRREGULAR MENSES: Status: ACTIVE | Noted: 2024-04-30

## 2024-04-30 PROBLEM — E21.3 HYPERPARATHYROIDISM (MULTI): Status: ACTIVE | Noted: 2024-04-30

## 2024-04-30 PROBLEM — M25.50 ARTHRALGIA: Status: ACTIVE | Noted: 2024-04-30

## 2024-04-30 PROBLEM — N39.3 STRESS INCONTINENCE IN FEMALE: Status: ACTIVE | Noted: 2024-04-30

## 2024-04-30 PROBLEM — J30.1 ACUTE ALLERGIC RHINITIS DUE TO POLLEN: Status: ACTIVE | Noted: 2024-04-30

## 2024-04-30 PROBLEM — R43.2 ABNORMAL TASTE IN MOUTH: Status: ACTIVE | Noted: 2024-04-30

## 2024-04-30 PROBLEM — E66.9 OBESITY: Status: ACTIVE | Noted: 2024-04-30

## 2024-04-30 PROBLEM — N80.9 ENDOMETRIOSIS: Status: ACTIVE | Noted: 2024-04-30

## 2024-04-30 PROBLEM — R42 DIZZINESS AND GIDDINESS: Status: ACTIVE | Noted: 2024-04-30

## 2024-04-30 PROBLEM — R19.8 DISORDER OF ABDOMEN: Status: ACTIVE | Noted: 2024-04-30

## 2024-04-30 PROBLEM — F41.0 PANIC ATTACK: Status: ACTIVE | Noted: 2024-04-30

## 2024-04-30 PROBLEM — N92.0 MENORRHAGIA: Status: ACTIVE | Noted: 2024-04-30

## 2024-04-30 PROBLEM — F41.1 GAD (GENERALIZED ANXIETY DISORDER): Status: ACTIVE | Noted: 2018-11-27

## 2024-04-30 PROBLEM — K25.3 ACUTE GASTRIC ULCER WITHOUT HEMORRHAGE OR PERFORATION: Status: ACTIVE | Noted: 2024-04-30

## 2024-04-30 PROBLEM — N94.6 DYSMENORRHEA: Status: ACTIVE | Noted: 2023-02-08

## 2024-04-30 PROBLEM — R53.83 FATIGUE: Status: ACTIVE | Noted: 2024-04-30

## 2024-04-30 PROCEDURE — 99213 OFFICE O/P EST LOW 20 MIN: CPT | Performed by: NURSE PRACTITIONER

## 2024-04-30 PROCEDURE — 1036F TOBACCO NON-USER: CPT | Performed by: INTERNAL MEDICINE

## 2024-04-30 PROCEDURE — 3079F DIAST BP 80-89 MM HG: CPT | Performed by: INTERNAL MEDICINE

## 2024-04-30 PROCEDURE — 3074F SYST BP LT 130 MM HG: CPT | Performed by: NURSE PRACTITIONER

## 2024-04-30 PROCEDURE — 3079F DIAST BP 80-89 MM HG: CPT | Performed by: NURSE PRACTITIONER

## 2024-04-30 PROCEDURE — 99213 OFFICE O/P EST LOW 20 MIN: CPT | Performed by: INTERNAL MEDICINE

## 2024-04-30 PROCEDURE — 3077F SYST BP >= 140 MM HG: CPT | Performed by: INTERNAL MEDICINE

## 2024-04-30 RX ORDER — HYDROCHLOROTHIAZIDE 25 MG/1
25 TABLET ORAL DAILY
COMMUNITY
Start: 2019-12-17 | End: 2024-04-30 | Stop reason: ALTCHOICE

## 2024-04-30 RX ORDER — BISOPROLOL FUMARATE 5 MG/1
5 TABLET, FILM COATED ORAL DAILY
Qty: 90 TABLET | Refills: 3 | Status: SHIPPED | OUTPATIENT
Start: 2024-04-30 | End: 2025-04-30

## 2024-04-30 RX ORDER — AMLODIPINE BESYLATE 5 MG/1
5 TABLET ORAL DAILY
Qty: 30 TABLET | Refills: 11 | Status: SHIPPED | OUTPATIENT
Start: 2024-04-30 | End: 2024-05-01

## 2024-04-30 RX ORDER — BUPROPION HYDROCHLORIDE 150 MG/1
150 TABLET ORAL
COMMUNITY
Start: 2023-11-30 | End: 2024-05-23

## 2024-04-30 RX ORDER — METOPROLOL TARTRATE 25 MG/1
25 TABLET, FILM COATED ORAL 2 TIMES DAILY
COMMUNITY
Start: 2021-05-14 | End: 2024-04-30 | Stop reason: ALTCHOICE

## 2024-04-30 RX ORDER — BUPROPION HYDROCHLORIDE 300 MG/1
300 TABLET ORAL DAILY
COMMUNITY
Start: 2024-03-25 | End: 2024-05-23

## 2024-04-30 RX ORDER — ACETAMINOPHEN 500 MG
1 TABLET ORAL DAILY
COMMUNITY
Start: 2017-12-11

## 2024-04-30 RX ORDER — HYDROXYZINE PAMOATE 25 MG/1
25 CAPSULE ORAL 3 TIMES DAILY PRN
Qty: 30 CAPSULE | Refills: 0 | Status: SHIPPED | OUTPATIENT
Start: 2024-04-30 | End: 2024-05-29

## 2024-04-30 ASSESSMENT — PATIENT HEALTH QUESTIONNAIRE - PHQ9
2. FEELING DOWN, DEPRESSED OR HOPELESS: NOT AT ALL
SUM OF ALL RESPONSES TO PHQ9 QUESTIONS 1 AND 2: 0
SUM OF ALL RESPONSES TO PHQ9 QUESTIONS 1 AND 2: 0
1. LITTLE INTEREST OR PLEASURE IN DOING THINGS: NOT AT ALL
2. FEELING DOWN, DEPRESSED OR HOPELESS: NOT AT ALL
1. LITTLE INTEREST OR PLEASURE IN DOING THINGS: NOT AT ALL

## 2024-04-30 ASSESSMENT — ENCOUNTER SYMPTOMS
LOSS OF SENSATION IN FEET: 0
OCCASIONAL FEELINGS OF UNSTEADINESS: 0
OCCASIONAL FEELINGS OF UNSTEADINESS: 0
DEPRESSION: 0
DEPRESSION: 0
LOSS OF SENSATION IN FEET: 0

## 2024-04-30 ASSESSMENT — PAIN SCALES - GENERAL
PAINLEVEL: 0-NO PAIN
PAINLEVEL: 0-NO PAIN

## 2024-04-30 ASSESSMENT — LIFESTYLE VARIABLES
SKIP TO QUESTIONS 9-10: 1
HAVE YOU OR SOMEONE ELSE BEEN INJURED AS A RESULT OF YOUR DRINKING: NO
HOW MANY STANDARD DRINKS CONTAINING ALCOHOL DO YOU HAVE ON A TYPICAL DAY: PATIENT DOES NOT DRINK
HOW OFTEN DO YOU HAVE A DRINK CONTAINING ALCOHOL: NEVER
AUDIT TOTAL SCORE: 0
AUDIT-C TOTAL SCORE: 0
HAS A RELATIVE, FRIEND, DOCTOR, OR ANOTHER HEALTH PROFESSIONAL EXPRESSED CONCERN ABOUT YOUR DRINKING OR SUGGESTED YOU CUT DOWN: NO
HOW OFTEN DO YOU HAVE SIX OR MORE DRINKS ON ONE OCCASION: NEVER

## 2024-04-30 NOTE — TELEPHONE ENCOUNTER
Pharmacy is requesting a  refill on behalf of the pt       Requested Prescriptions     Pending Prescriptions Disp Refills    amLODIPine (Norvasc) 5 mg tablet [Pharmacy Med Name: AMLODIPINE BESYLATE 5MG TABLETS] 90 tablet 3     Sig: Take 1 tablet (5 mg) by mouth once daily.

## 2024-04-30 NOTE — PROGRESS NOTES
Subjective   Patient ID: Dot Breen is a 38 y.o. female who presents for Rash (Patient states that she has had a red itchy rash on both ankles for a week now and is getting worse.).  Having rash on bilateral ankle rash and itching over one week. Has eczema on hands and not improving   HPI     Review of Systems    Objective   /88   Pulse 60   Resp 18   Wt 78.5 kg (173 lb)   SpO2 100%   BMI 29.70 kg/m²     Physical Exam  Constitutional:       General: She is not in acute distress.     Appearance: Normal appearance.   Cardiovascular:      Rate and Rhythm: Normal rate and regular rhythm.      Heart sounds: No murmur heard.  Pulmonary:      Breath sounds: Normal breath sounds. No wheezing.   Skin:     General: Skin is warm.      Findings: Rash (bilateral ankles) present.   Neurological:      Mental Status: She is alert.         Assessment/Plan   Problem List Items Addressed This Visit    None  Visit Diagnoses         Codes    Other atopic dermatitis    -  Primary L20.89    Relevant Orders    Referral to Allergy    Acute idiopathic urticaria     L50.1    Relevant Medications    hydrOXYzine pamoate (VistariL) 25 mg capsule    Other Relevant Orders    Referral to Allergy

## 2024-04-30 NOTE — PROGRESS NOTES
History of present illness:  38-year-old female history of bariatric surgery and Nissen fundoplication  by Dr. Josep Capellan.  Patient returns to my office for follow-up.  2D echo 4/8/2022 in my office that showed normal ejection fraction no major valve abnormalities.  Denies having any shortness of breath chest pain palpitations or dizziness.  She has been feeling much better on the bisoprolol however her blood pressure lately has been high.  Running in the 170s    Past Medical History:   Diagnosis Date    HTN (hypertension)     Other specified diabetes mellitus without complications (Multi)     Diabetes mellitus of other type without complication    Personal history of other diseases of the circulatory system     History of hypertension    Personal history of other mental and behavioral disorders 07/18/2017    History of anxiety disorder    Unspecified asthma, uncomplicated ([facility]) 01/04/2017    Asthmatic bronchitis       Past Surgical History:   Procedure Laterality Date    CHOLECYSTECTOMY  2023    HYSTERECTOMY  2023    OTHER SURGICAL HISTORY  06/09/2021    Gastric bypass surgery    OTHER SURGICAL HISTORY  03/2024    OTHER SURGICAL HISTORY      LAPAROSCOPIC LYSIS OF ADHESIONS/ ESOPHAGOGASTRODUODENOSCOPY  (PATY)       Allergies   Allergen Reactions    Vilazodone Rash    Trazodone Rash        reports that she quit smoking about 10 years ago. Her smoking use included cigarettes. She has never been exposed to tobacco smoke. She has never used smokeless tobacco. She reports that she does not drink alcohol and does not use drugs.    Family History   Problem Relation Name Age of Onset    Heart disease Mother      Cancer Father         Patient's Medications   New Prescriptions    No medications on file   Previous Medications    ACETAMINOPHEN (TYLENOL) 500 MG TABLET    Take 2 tablets (1,000 mg) by mouth every 6 hours if needed for mild pain (1 - 3).    BISOPROLOL (ZEBETA) 5 MG TABLET    Take 1 tablet (5 mg) by mouth once  daily. Takes 1/2 daily    BUPROPION XL (WELLBUTRIN XL) 150 MG 24 HR TABLET    Take 1 tablet (150 mg) by mouth once daily in the morning. Take before meals.    BUPROPION XL (WELLBUTRIN XL) 300 MG 24 HR TABLET    Take 1 tablet (300 mg) by mouth once daily.    CHOLECALCIFEROL (VITAMIN D-3) 50 MCG (2,000 UNIT) CAPSULE    Take 1 capsule (50 mcg) by mouth once daily.    HYDROCHLOROTHIAZIDE (HYDRODIURIL) 25 MG TABLET    Take 1 tablet (25 mg) by mouth once daily.    LEVOCETIRIZINE (XYZAL) 5 MG TABLET    Take 1 tablet (5 mg) by mouth once daily at bedtime.    METOPROLOL TARTRATE (LOPRESSOR) 25 MG TABLET    Take 1 tablet (25 mg) by mouth 2 times a day.    OMEPRAZOLE (PRILOSEC) 40 MG DR CAPSULE    Take 1 capsule (40 mg) by mouth 2 times a day before meals. Do not crush or chew.    ONDANSETRON (ZOFRAN) 4 MG TABLET    Take 1 tablet (4 mg) by mouth every 8 hours if needed for nausea or vomiting.    OXYCODONE-ACETAMINOPHEN (PERCOCET) 5-325 MG TABLET    Take 1 tablet by mouth every 8 hours if needed for severe pain (7 - 10).    POLYETHYLENE GLYCOL (GLYCOLAX, MIRALAX) 17 GRAM PACKET    Take 17 g by mouth once daily.    RIFAXIMIN (XIFAXAN) 550 MG TABLET    Take 1 tablet (550 mg) by mouth 3 times a day.    SUCRALFATE (CARAFATE) 100 MG/ML SUSPENSION    Take 10 mL (1 g) by mouth 4 times a day.   Modified Medications    No medications on file   Discontinued Medications    No medications on file       Objective   Physical Exam  General: Patient in no acute distress   HEENT: Atraumatic normocephalic.  Neck: Supple, jugular venous pressure within normal limit.  No bruits  Lungs: Clear to auscultation bilaterally  Cardiovascular: Regular rate and rhythm, normal heart sounds, no murmurs rubs or gallops  Abdomen: Soft nontender nondistended.  Normal bowel sounds.  Extremities: Warm to touch, no edema.        Lab Review   Admission on 03/31/2024, Discharged on 03/31/2024   Component Date Value    Color, Urine 03/31/2024 Light-Yellow      Appearance, Urine 03/31/2024 Clear     Specific Gravity, Urine 03/31/2024 1.016     pH, Urine 03/31/2024 6.5     Protein, Urine 03/31/2024 20 (TRACE)     Glucose, Urine 03/31/2024 Normal     Blood, Urine 03/31/2024 NEGATIVE     Ketones, Urine 03/31/2024 NEGATIVE     Bilirubin, Urine 03/31/2024 NEGATIVE     Urobilinogen, Urine 03/31/2024 Normal     Nitrite, Urine 03/31/2024 NEGATIVE     Leukocyte Esterase, Urine 03/31/2024 NEGATIVE     Glucose 03/31/2024 117 (H)     Sodium 03/31/2024 137     Potassium 03/31/2024 5.5 (H)     Chloride 03/31/2024 102     Bicarbonate 03/31/2024 27     Urea Nitrogen 03/31/2024 12     Creatinine 03/31/2024 0.70     eGFR 03/31/2024 >90     Calcium 03/31/2024 9.2     Albumin 03/31/2024 4.5     Alkaline Phosphatase 03/31/2024 68     Total Protein 03/31/2024 7.3     AST 03/31/2024 16     Bilirubin, Total 03/31/2024 <0.2     ALT 03/31/2024 20     Anion Gap 03/31/2024 8     Lipase 03/31/2024 52     WBC 03/31/2024 7.4     nRBC 03/31/2024 0.0     RBC 03/31/2024 4.49     Hemoglobin 03/31/2024 12.9     Hematocrit 03/31/2024 40.0     MCV 03/31/2024 89     MCH 03/31/2024 28.7     MCHC 03/31/2024 32.3     RDW 03/31/2024 13.4     Platelets 03/31/2024 391     HCG, Urine 03/31/2024 NEGATIVE     WBC, Urine 03/31/2024 NONE     RBC, Urine 03/31/2024 1-2     Mucus, Urine 03/31/2024 FEW     Ventricular Rate 03/31/2024 59     Atrial Rate 03/31/2024 59     VA Interval 03/31/2024 148     QRS Duration 03/31/2024 82     QT Interval 03/31/2024 410     QTC Calculation(Bazett) 03/31/2024 405     P Axis 03/31/2024 45     R [place] 03/31/2024 15     T Axis 03/31/2024 36     QRS Count 03/31/2024 9     Q Onset 03/31/2024 220     P Onset 03/31/2024 146     P Offset 03/31/2024 195     T Offset 03/31/2024 425     QTC [place] 03/31/2024 407    Lab on 03/30/2024   Component Date Value    WBC 03/30/2024 6.6     nRBC 03/30/2024 0.0     RBC 03/30/2024 4.50     Hemoglobin 03/30/2024 12.8     Hematocrit 03/30/2024 41.4     MCV  03/30/2024 92     MCH 03/30/2024 28.4     MCHC 03/30/2024 30.9 (L)     RDW 03/30/2024 13.4     Platelets 03/30/2024 353     Neutrophils % 03/30/2024 46.5     Immature Granulocytes %,* 03/30/2024 0.3     Lymphocytes % 03/30/2024 38.4     Monocytes % 03/30/2024 8.7     Eosinophils % 03/30/2024 5.0     Basophils % 03/30/2024 1.1     Neutrophils Absolute 03/30/2024 3.09     Immature Granulocytes Ab* 03/30/2024 0.02     Lymphocytes Absolute 03/30/2024 2.55     Monocytes Absolute 03/30/2024 0.58     Eosinophils Absolute 03/30/2024 0.33     Basophils Absolute 03/30/2024 0.07     Glucose 03/30/2024 85     Sodium 03/30/2024 141     Potassium 03/30/2024 4.9     Chloride 03/30/2024 102     Bicarbonate 03/30/2024 27     Urea Nitrogen 03/30/2024 10     Creatinine 03/30/2024 0.70     eGFR 03/30/2024 >90     Calcium 03/30/2024 9.4     Anion Gap 03/30/2024 12    Admission on 03/13/2024, Discharged on 03/15/2024   Component Date Value    WBC 03/13/2024 12.9 (H)     nRBC 03/13/2024 0.0     RBC 03/13/2024 3.94 (L)     Hemoglobin 03/13/2024 11.2 (L)     Hematocrit 03/13/2024 35.0 (L)     MCV 03/13/2024 89     MCH 03/13/2024 28.4     MCHC 03/13/2024 32.0     RDW 03/13/2024 11.6     Platelets 03/13/2024 480 (H)     Neutrophils % 03/13/2024 71.4     Immature Granulocytes %,* 03/13/2024 0.5     Lymphocytes % 03/13/2024 19.5     Monocytes % 03/13/2024 7.1     Eosinophils % 03/13/2024 1.1     Basophils % 03/13/2024 0.4     Neutrophils Absolute 03/13/2024 9.23 (H)     Immature Granulocytes Ab* 03/13/2024 0.06     Lymphocytes Absolute 03/13/2024 2.51     Monocytes Absolute 03/13/2024 0.91     Eosinophils Absolute 03/13/2024 0.14     Basophils Absolute 03/13/2024 0.05     WBC 03/14/2024 10.6     nRBC 03/14/2024 0.0     RBC 03/14/2024 3.81 (L)     Hemoglobin 03/14/2024 10.8 (L)     Hematocrit 03/14/2024 33.6 (L)     MCV 03/14/2024 88     MCH 03/14/2024 28.3     MCHC 03/14/2024 32.1     RDW 03/14/2024 11.6     Platelets 03/14/2024 408      Neutrophils % 03/14/2024 71.0     Immature Granulocytes %,* 03/14/2024 0.4     Lymphocytes % 03/14/2024 20.8     Monocytes % 03/14/2024 6.2     Eosinophils % 03/14/2024 1.1     Basophils % 03/14/2024 0.5     Neutrophils Absolute 03/14/2024 7.52     Immature Granulocytes Ab* 03/14/2024 0.04     Lymphocytes Absolute 03/14/2024 2.21     Monocytes Absolute 03/14/2024 0.66     Eosinophils Absolute 03/14/2024 0.12     Basophils Absolute 03/14/2024 0.05     Glucose 03/14/2024 89     Sodium 03/14/2024 139     Potassium 03/14/2024 3.9     Chloride 03/14/2024 104     Bicarbonate 03/14/2024 25     Urea Nitrogen 03/14/2024 6 (L)     Creatinine 03/14/2024 0.60     eGFR 03/14/2024 >90     Calcium 03/14/2024 8.6     Phosphorus 03/14/2024 3.4     Albumin 03/14/2024 3.6     Anion Gap 03/14/2024 10     Hemoglobin 03/14/2024 10.9 (L)     Hematocrit 03/14/2024 33.4 (L)     Extra Tube 03/14/2024 Hold for add-ons.     Glucose 03/15/2024 107 (H)     Sodium 03/15/2024 141     Potassium 03/15/2024 4.2     Chloride 03/15/2024 105     Bicarbonate 03/15/2024 24     Urea Nitrogen 03/15/2024 5 (L)     Creatinine 03/15/2024 0.60     eGFR 03/15/2024 >90     Calcium 03/15/2024 8.5     Albumin 03/15/2024 3.1 (L)     Alkaline Phosphatase 03/15/2024 61     Total Protein 03/15/2024 5.5 (L)     AST 03/15/2024 23     Bilirubin, Total 03/15/2024 <0.2     ALT 03/15/2024 21     Anion Gap 03/15/2024 12     WBC 03/15/2024 11.9 (H)     nRBC 03/15/2024 0.0     RBC 03/15/2024 3.53 (L)     Hemoglobin 03/15/2024 9.9 (L)     Hematocrit 03/15/2024 31.2 (L)     MCV 03/15/2024 88     MCH 03/15/2024 28.0     MCHC 03/15/2024 31.7 (L)     RDW 03/15/2024 11.8     Platelets 03/15/2024 397     Neutrophils % 03/15/2024 79.0     Immature Granulocytes %,* 03/15/2024 0.3     Lymphocytes % 03/15/2024 15.4     Monocytes % 03/15/2024 5.1     Eosinophils % 03/15/2024 0.0     Basophils % 03/15/2024 0.2     Neutrophils Absolute 03/15/2024 9.42 (H)     Immature Granulocytes Ab*  03/15/2024 0.04     Lymphocytes Absolute 03/15/2024 1.84     Monocytes Absolute 03/15/2024 0.61     Eosinophils Absolute 03/15/2024 0.00     Basophils Absolute 03/15/2024 0.02    Admission on 03/13/2024, Discharged on 03/13/2024   Component Date Value    WBC 03/13/2024 11.0     nRBC 03/13/2024 0.0     RBC 03/13/2024 4.28     Hemoglobin 03/13/2024 12.3     Hematocrit 03/13/2024 37.7     MCV 03/13/2024 88     MCH 03/13/2024 28.7     MCHC 03/13/2024 32.6     RDW 03/13/2024 11.7     Platelets 03/13/2024 517 (H)     Neutrophils % 03/13/2024 62.9     Immature Granulocytes %,* 03/13/2024 0.4     Lymphocytes % 03/13/2024 26.3     Monocytes % 03/13/2024 8.2     Eosinophils % 03/13/2024 1.7     Basophils % 03/13/2024 0.5     Neutrophils Absolute 03/13/2024 6.95     Immature Granulocytes Ab* 03/13/2024 0.04     Lymphocytes Absolute 03/13/2024 2.90     Monocytes Absolute 03/13/2024 0.90     Eosinophils Absolute 03/13/2024 0.19     Basophils Absolute 03/13/2024 0.06     Glucose 03/13/2024 93     Sodium 03/13/2024 139     Potassium 03/13/2024 4.1     Chloride 03/13/2024 103     Bicarbonate 03/13/2024 24     Urea Nitrogen 03/13/2024 10     Creatinine 03/13/2024 0.50     eGFR 03/13/2024 >90     Calcium 03/13/2024 8.9     Albumin 03/13/2024 3.7     Alkaline Phosphatase 03/13/2024 81     Total Protein 03/13/2024 7.2     AST 03/13/2024 12     Bilirubin, Total 03/13/2024 <0.2     ALT 03/13/2024 14     Anion Gap 03/13/2024 12     Color, Urine 03/13/2024 Light-Yellow     Appearance, Urine 03/13/2024 Clear     Specific Gravity, Urine 03/13/2024 1.025     pH, Urine 03/13/2024 6.0     Protein, Urine 03/13/2024 30 (1+) (A)     Glucose, Urine 03/13/2024 Normal     Blood, Urine 03/13/2024 NEGATIVE     Ketones, Urine 03/13/2024 NEGATIVE     Bilirubin, Urine 03/13/2024 NEGATIVE     Urobilinogen, Urine 03/13/2024 Normal     Nitrite, Urine 03/13/2024 NEGATIVE     Leukocyte Esterase, Urine 03/13/2024 NEGATIVE     WBC, Urine 03/13/2024 1-5     RBC,  Urine 2024 3-5     Squamous Epithelial Cell* 2024 1-9 (SPARSE)     Bacteria, Urine 2024 1+ (A)     Mucus, Urine 2024 2+    Office Visit on 2024   Component Date Value    Rheumatoid Factor 2024 <10     Sedimentation Rate 2024 61 (H)     C-Reactive Protein 2024 5.10 (H)     ADELINE 2024 Negative         Assessment/Plan   Patient Active Problem List   Diagnosis    Gastroesophageal reflux disease without esophagitis    GI bleed    Pain of upper abdomen    Surgery follow-up examination    Attention deficit hyperactivity disorder (ADHD), combined type    Bipolar I disorder (Multi)    Posttraumatic stress disorder    NAT (generalized anxiety disorder)    Insomnia related to another mental disorder    Fofana's esophageal ulceration    Biliary dyskinesia    Chronic pain    Chronic pain of right elbow    Inflammatory dermatosis    Diabetes mellitus without complication (Multi)    Diastolic hypertension    Dizziness and giddiness    Dysmenorrhea    Elevated TSH    Fatigue    Gastroenteritis    Globus sensation    Granuloma inguinale    Hyperlipidemia    Hyperparathyroidism (Multi)    Intestinal malabsorption ([facility])    Irregular menses    Jaw pain    Jejunal ulcer    Laceration of finger    Laryngopharyngeal reflux    Lipoma of right lower extremity    Memory impairment    Menorrhagia    Metrorrhagia    Moderate episode of recurrent major depressive disorder (Multi)    Muscle twitch    Constipation    Disorder of abdomen    Nausea    Obesity    Endometriosis    Obstructive sleep apnea    Otitis media    Palpitations    Panic attack    Panic disorder without agoraphobia    Abnormal taste in mouth    Paresthesia    Previous  delivery, antepartum condition or complication ([facility])    Pyrexia of unknown origin    Right elbow tendonitis    Strain of unspecified muscle, fascia and tendon at shoulder and upper arm level, right arm, initial encounter    Stress incontinence  in female    Throat clearing    Vitamin D deficiency    Epigastric pain    Hip pain    Backache    Arthralgia    Back strain    Acute gastric ulcer without hemorrhage or perforation    Acute allergic rhinitis due to pollen    Acquired hypothyroidism    Abnormal vaginal bleeding    38-year-old female history of bariatric surgery and Nissen fundoplication  by Dr. Josep Capellan.  Patient returns to my office for follow-up.  2D echo 4/8/2022 in my office that showed normal ejection fraction no major valve abnormalities.  Denies having any shortness of breath chest pain palpitations or dizziness.  She has been feeling much better on the bisoprolol however her blood pressure lately has been high.  Running in the 170s at home today in the office 141/88.  Discussed with patient lifestyle modification.  My recommendation is to start amlodipine 5 mg oral daily.  If needed will increase it to 10 mg oral daily and reassess.  Follow-up in 3 months.  Otherwise she stable cardiac jack    Bharath Castaneda MD

## 2024-05-01 RX ORDER — AMLODIPINE BESYLATE 5 MG/1
5 TABLET ORAL DAILY
Qty: 90 TABLET | Refills: 3 | Status: SHIPPED | OUTPATIENT
Start: 2024-05-01 | End: 2025-04-26

## 2024-05-02 ENCOUNTER — ANESTHESIA EVENT (OUTPATIENT)
Dept: GASTROENTEROLOGY | Facility: HOSPITAL | Age: 39
End: 2024-05-02
Payer: COMMERCIAL

## 2024-05-02 ENCOUNTER — HOSPITAL ENCOUNTER (OUTPATIENT)
Dept: GASTROENTEROLOGY | Facility: HOSPITAL | Age: 39
Discharge: HOME | End: 2024-05-02
Payer: COMMERCIAL

## 2024-05-02 ENCOUNTER — ANESTHESIA (OUTPATIENT)
Dept: GASTROENTEROLOGY | Facility: HOSPITAL | Age: 39
End: 2024-05-02
Payer: COMMERCIAL

## 2024-05-02 VITALS
BODY MASS INDEX: 29.19 KG/M2 | DIASTOLIC BLOOD PRESSURE: 71 MMHG | HEART RATE: 67 BPM | WEIGHT: 171 LBS | HEIGHT: 64 IN | TEMPERATURE: 97.3 F | SYSTOLIC BLOOD PRESSURE: 115 MMHG | OXYGEN SATURATION: 100 % | RESPIRATION RATE: 16 BRPM

## 2024-05-02 DIAGNOSIS — K21.9 GASTRO-ESOPHAGEAL REFLUX DISEASE WITHOUT ESOPHAGITIS: ICD-10-CM

## 2024-05-02 LAB — GLUCOSE BLD MANUAL STRIP-MCNC: 82 MG/DL (ref 74–99)

## 2024-05-02 PROCEDURE — 43239 EGD BIOPSY SINGLE/MULTIPLE: CPT | Performed by: SURGERY

## 2024-05-02 PROCEDURE — 7100000010 HC PHASE TWO TIME - EACH INCREMENTAL 1 MINUTE

## 2024-05-02 PROCEDURE — 7100000009 HC PHASE TWO TIME - INITIAL BASE CHARGE

## 2024-05-02 PROCEDURE — 2500000004 HC RX 250 GENERAL PHARMACY W/ HCPCS (ALT 636 FOR OP/ED): Performed by: ANESTHESIOLOGIST ASSISTANT

## 2024-05-02 PROCEDURE — 88305 TISSUE EXAM BY PATHOLOGIST: CPT | Performed by: PATHOLOGY

## 2024-05-02 PROCEDURE — A43239 PR EDG TRANSORAL BIOPSY SINGLE/MULTIPLE: Performed by: ANESTHESIOLOGIST ASSISTANT

## 2024-05-02 PROCEDURE — 82947 ASSAY GLUCOSE BLOOD QUANT: CPT

## 2024-05-02 PROCEDURE — 3700000001 HC GENERAL ANESTHESIA TIME - INITIAL BASE CHARGE

## 2024-05-02 PROCEDURE — 88305 TISSUE EXAM BY PATHOLOGIST: CPT | Mod: TC | Performed by: SURGERY

## 2024-05-02 PROCEDURE — 2500000004 HC RX 250 GENERAL PHARMACY W/ HCPCS (ALT 636 FOR OP/ED)

## 2024-05-02 PROCEDURE — 7100000001 HC RECOVERY ROOM TIME - INITIAL BASE CHARGE

## 2024-05-02 PROCEDURE — A43239 PR EDG TRANSORAL BIOPSY SINGLE/MULTIPLE: Performed by: ANESTHESIOLOGY

## 2024-05-02 PROCEDURE — 7100000002 HC RECOVERY ROOM TIME - EACH INCREMENTAL 1 MINUTE

## 2024-05-02 PROCEDURE — 3700000002 HC GENERAL ANESTHESIA TIME - EACH INCREMENTAL 1 MINUTE

## 2024-05-02 RX ORDER — ALBUTEROL SULFATE 0.83 MG/ML
2.5 SOLUTION RESPIRATORY (INHALATION) ONCE
Status: DISCONTINUED | OUTPATIENT
Start: 2024-05-02 | End: 2024-05-02

## 2024-05-02 RX ORDER — LABETALOL HYDROCHLORIDE 5 MG/ML
10 INJECTION, SOLUTION INTRAVENOUS ONCE AS NEEDED
Status: CANCELLED | OUTPATIENT
Start: 2024-05-02

## 2024-05-02 RX ORDER — ALBUTEROL SULFATE 0.83 MG/ML
2.5 SOLUTION RESPIRATORY (INHALATION) ONCE AS NEEDED
Status: CANCELLED | OUTPATIENT
Start: 2024-05-02

## 2024-05-02 RX ORDER — HYDRALAZINE HYDROCHLORIDE 20 MG/ML
10 INJECTION INTRAMUSCULAR; INTRAVENOUS EVERY 30 MIN PRN
Status: CANCELLED | OUTPATIENT
Start: 2024-05-02

## 2024-05-02 RX ORDER — ONDANSETRON HYDROCHLORIDE 2 MG/ML
4 INJECTION, SOLUTION INTRAVENOUS ONCE AS NEEDED
Status: CANCELLED | OUTPATIENT
Start: 2024-05-02

## 2024-05-02 RX ORDER — PROPOFOL 10 MG/ML
INJECTION, EMULSION INTRAVENOUS AS NEEDED
Status: DISCONTINUED | OUTPATIENT
Start: 2024-05-02 | End: 2024-05-02

## 2024-05-02 RX ORDER — SODIUM CHLORIDE, SODIUM LACTATE, POTASSIUM CHLORIDE, CALCIUM CHLORIDE 600; 310; 30; 20 MG/100ML; MG/100ML; MG/100ML; MG/100ML
20 INJECTION, SOLUTION INTRAVENOUS CONTINUOUS
Status: DISCONTINUED | OUTPATIENT
Start: 2024-05-02 | End: 2024-05-03 | Stop reason: HOSPADM

## 2024-05-02 RX ORDER — MIDAZOLAM HYDROCHLORIDE 1 MG/ML
INJECTION, SOLUTION INTRAMUSCULAR; INTRAVENOUS AS NEEDED
Status: DISCONTINUED | OUTPATIENT
Start: 2024-05-02 | End: 2024-05-02

## 2024-05-02 RX ORDER — DIPHENHYDRAMINE HYDROCHLORIDE 50 MG/ML
12.5 INJECTION INTRAMUSCULAR; INTRAVENOUS ONCE AS NEEDED
Status: CANCELLED | OUTPATIENT
Start: 2024-05-02

## 2024-05-02 RX ORDER — LIDOCAINE HYDROCHLORIDE 10 MG/ML
0.1 INJECTION INFILTRATION; PERINEURAL ONCE
Status: CANCELLED | OUTPATIENT
Start: 2024-05-02 | End: 2024-05-02

## 2024-05-02 RX ADMIN — PROPOFOL 100 MG: 10 INJECTION, EMULSION INTRAVENOUS at 09:51

## 2024-05-02 RX ADMIN — PROPOFOL 50 MG: 10 INJECTION, EMULSION INTRAVENOUS at 09:55

## 2024-05-02 RX ADMIN — SODIUM CHLORIDE, POTASSIUM CHLORIDE, SODIUM LACTATE AND CALCIUM CHLORIDE: 600; 310; 30; 20 INJECTION, SOLUTION INTRAVENOUS at 09:30

## 2024-05-02 RX ADMIN — PROPOFOL 50 MG: 10 INJECTION, EMULSION INTRAVENOUS at 09:45

## 2024-05-02 RX ADMIN — MIDAZOLAM 2 MG: 1 INJECTION INTRAMUSCULAR; INTRAVENOUS at 09:46

## 2024-05-02 RX ADMIN — PROPOFOL 100 MG: 10 INJECTION, EMULSION INTRAVENOUS at 09:59

## 2024-05-02 SDOH — HEALTH STABILITY: MENTAL HEALTH: CURRENT SMOKER: 0

## 2024-05-02 ASSESSMENT — PAIN SCALES - GENERAL
PAINLEVEL_OUTOF10: 0 - NO PAIN
PAIN_LEVEL: 2
PAINLEVEL_OUTOF10: 0 - NO PAIN
PAINLEVEL_OUTOF10: 0 - NO PAIN
PAINLEVEL_OUTOF10: 4

## 2024-05-02 ASSESSMENT — PAIN - FUNCTIONAL ASSESSMENT
PAIN_FUNCTIONAL_ASSESSMENT: 0-10
PAIN_FUNCTIONAL_ASSESSMENT: CPOT (CRITICAL CARE PAIN OBSERVATION TOOL)

## 2024-05-02 NOTE — DISCHARGE INSTRUCTIONS
Instructions  Patient Instructions after a Colonoscopy, Upper GI, and ERCP      The anesthetics, sedatives or narcotics which were given to you today will be acting in your body for the next 24 hours, so you might feel a little sleepy or groggy.  This feeling should slowly wear off. Carefully read and follow the instructions.      You received sedation today:  - Do not drive or operate any machinery or power tools of any kind.   - No alcoholic beverages today, not even beer or wine.  - Do not make any important decisions or sign any legal documents.  - No over the counter medications that contain alcohol or that may cause drowsiness.  - Do not make any important decisions or sign any legal documents.     While it is common to experience mild to moderate abdominal distention, gas, or belching after your procedure, if any of these symptoms occur following discharge from the GI Lab or within one week of having your procedure, call the [facility] to be advised whether a visit to your nearest Urgent Care or Emergency Department is indicated.  Take this paper with you if you go.      - If you develop an allergic reaction to the medications that were given during your procedure such as difficulty breathing, rash, hives, severe nausea, vomiting or lightheadedness.- If you experience chest pain, shortness of breath, severe abdominal pain, fevers and chills.     -If you develop signs and symptoms of bleeding such as blood in your spit, if your stools turn black, tarry, or bloody     - If you have not urinated within 8 hours following your procedure.- If your IV site becomes painful, red, inflamed, or looks infected.     If you received a biopsy/polypectomy/sphincterotomy the following instructions apply below:     - Do not use Aspirin containing products, non-steroidal medications or anti-coagulants for one week following your procedure. (Examples of these types of medications are: Advil, Arthrotec, Aleve,  Coumadin, Ecotrin, Heparin, Ibuprofen, Indocin, Motrin, Naprosyn, Nuprin, Plavix, Vioxx, and Voltarin, or their generic forms.  This list is not all-inclusive.  Check with your physician or pharmacist before resuming medications.)      - Eat a soft diet today.  Avoid foods that are poorly digested for the next 24 hours.  These foods would include: nuts, beans, lettuce, red meats, and fried foods.       - Start with liquids and advance your diet as tolerated, gradually work up to eating solids.      - Do not have a Barium Study or Enema for one week.     Your physician recommends the additional following instructions:        Upper GI endoscopy: Resume your previous diet, continue your medications, recommendation to repeat upper endoscopy in three months for follow up of Fofana's ablation. Return to normal activity tomorrow.      ERCP: Recommended a repeat ERCP in eight weeks to exchange a stent. Watch for symptoms of pancreatitis, bleeding, perforation and cholangitis.  Please see Medication Reconciliation Form for new medication/medications prescribed.

## 2024-05-02 NOTE — SIGNIFICANT EVENT
Bedside report to Marisol BECKETT. All questions answered. POC to be continued. Transported to [facility]

## 2024-05-02 NOTE — ANESTHESIA PREPROCEDURE EVALUATION
Patient: Dot Breen    Procedure Information       Date/Time: 05/02/24 0945    Scheduled providers: Ken Kulkarni MD; Jeremy Russell DO    Procedure: EGD    Location: [facility] [place]            Relevant Problems   Cardiac   (+) Diastolic hypertension   (+) Hyperlipidemia      Pulmonary   (+) Obstructive sleep apnea      Neuro   (+) Bipolar I disorder (Multi)   (+) NAT (generalized anxiety disorder)   (+) Moderate episode of recurrent major depressive disorder (Multi)   (+) Panic attack   (+) Panic disorder without agoraphobia   (+) Posttraumatic stress disorder      GI   (+) Acute gastric ulcer without hemorrhage or perforation   (+) Fofana's esophageal ulceration   (+) GI bleed   (+) Gastroesophageal reflux disease without esophagitis      Endocrine   (+) Acquired hypothyroidism   (+) Hyperparathyroidism (Multi)   (+) Obesity      ID   (+) Granuloma inguinale      GYN   (+) Endometriosis   (+) Menorrhagia       Clinical information reviewed:                   NPO Detail:  No data recorded     Physical Exam    Airway  Mallampati: II  TM distance: >3 FB  Neck ROM: full     Cardiovascular   Rhythm: regular  Rate: normal     Dental - normal exam     Pulmonary   Breath sounds clear to auscultation     Abdominal   Abdomen: soft             Anesthesia Plan    History of general anesthesia?: yes  History of complications of general anesthesia?: no    ASA 3     MAC     The patient is not a current smoker.    intravenous induction   Postoperative administration of opioids is intended.  Anesthetic plan and risks discussed with patient.    Plan discussed with CRNA, attending and CAA.

## 2024-05-02 NOTE — ANESTHESIA POSTPROCEDURE EVALUATION
Patient: Dot Breen    Procedure Summary       Date: 05/02/24 Room / Location: [facility]    Anesthesia Start: 0941 Anesthesia Stop: 1009    Procedure: EGD Diagnosis: Gastro-esophageal reflux disease without esophagitis    Scheduled Providers: Ken Kulkarni MD; Jeremy Russell DO Responsible Provider: Jeremy Russell DO    Anesthesia Type: MAC ASA Status: 3            Anesthesia Type: MAC    Vitals Value Taken Time   /81 05/02/24 1009   Temp 36 05/02/24 1009   Pulse 98 05/02/24 1009   Resp 18 05/02/24 1009   SpO2 100 05/02/24 1009       Anesthesia Post Evaluation    Patient location during evaluation: PACU  Patient participation: complete - patient participated  Level of consciousness: awake and alert  Pain score: 2  Pain management: adequate  Airway patency: patent  Cardiovascular status: acceptable  Respiratory status: acceptable and face mask  Hydration status: acceptable  Postoperative Nausea and Vomiting: none        No notable events documented.

## 2024-05-23 ENCOUNTER — OFFICE VISIT (OUTPATIENT)
Dept: RHEUMATOLOGY | Facility: CLINIC | Age: 39
End: 2024-05-23
Payer: COMMERCIAL

## 2024-05-23 VITALS
OXYGEN SATURATION: 96 % | DIASTOLIC BLOOD PRESSURE: 80 MMHG | BODY MASS INDEX: 29.53 KG/M2 | SYSTOLIC BLOOD PRESSURE: 130 MMHG | WEIGHT: 173 LBS | HEART RATE: 69 BPM | HEIGHT: 64 IN

## 2024-05-23 DIAGNOSIS — M25.50 ARTHRALGIA, UNSPECIFIED JOINT: ICD-10-CM

## 2024-05-23 DIAGNOSIS — M35.9 UNDIFFERENTIATED CONNECTIVE TISSUE DISEASE (MULTI): ICD-10-CM

## 2024-05-23 DIAGNOSIS — R53.83 FATIGUE, UNSPECIFIED TYPE: ICD-10-CM

## 2024-05-23 DIAGNOSIS — D47.09 MASTOCYTOSIS: Primary | ICD-10-CM

## 2024-05-23 DIAGNOSIS — E03.9 ACQUIRED HYPOTHYROIDISM: ICD-10-CM

## 2024-05-23 PROCEDURE — 3079F DIAST BP 80-89 MM HG: CPT | Performed by: INTERNAL MEDICINE

## 2024-05-23 PROCEDURE — 99215 OFFICE O/P EST HI 40 MIN: CPT | Performed by: INTERNAL MEDICINE

## 2024-05-23 PROCEDURE — 1036F TOBACCO NON-USER: CPT | Performed by: INTERNAL MEDICINE

## 2024-05-23 PROCEDURE — 3075F SYST BP GE 130 - 139MM HG: CPT | Performed by: INTERNAL MEDICINE

## 2024-05-23 PROCEDURE — 99205 OFFICE O/P NEW HI 60 MIN: CPT | Performed by: INTERNAL MEDICINE

## 2024-05-23 RX ORDER — TRIAMCINOLONE ACETONIDE 1 MG/G
CREAM TOPICAL
COMMUNITY
Start: 2024-05-05

## 2024-05-23 ASSESSMENT — ENCOUNTER SYMPTOMS
DEPRESSION: 1
DIFFICULTY URINATING: 0
SHORTNESS OF BREATH: 0
CONSTIPATION: 0
ABDOMINAL PAIN: 1
CHILLS: 0
SORE THROAT: 1
HALLUCINATIONS: 0
WEAKNESS: 0
NAUSEA: 0
ROS GI COMMENTS: IBS
FATIGUE: 1
DYSURIA: 0
DIZZINESS: 1
ABDOMINAL DISTENTION: 1
EYE ITCHING: 0
JOINT SWELLING: 0
ARTHRALGIAS: 1
EYE PAIN: 0
BACK PAIN: 1
RESPIRATORY NEGATIVE: 1
FEVER: 0
AGITATION: 1
FREQUENCY: 1
LOSS OF SENSATION IN FEET: 0
SLEEP DISTURBANCE: 1
PHOTOPHOBIA: 1
FACIAL SWELLING: 0
COUGH: 0
HEADACHES: 1
OCCASIONAL FEELINGS OF UNSTEADINESS: 1
DIAPHORESIS: 1
NECK PAIN: 0
LIGHT-HEADEDNESS: 1
EYE REDNESS: 0
HEMATURIA: 0
DIARRHEA: 1
CONFUSION: 1
HEMATOLOGIC/LYMPHATIC NEGATIVE: 1
MYALGIAS: 1
NERVOUS/ANXIOUS: 1
DECREASED CONCENTRATION: 1
BLOOD IN STOOL: 0

## 2024-05-23 ASSESSMENT — PATIENT HEALTH QUESTIONNAIRE - PHQ9
3. TROUBLE FALLING OR STAYING ASLEEP OR SLEEPING TOO MUCH: NEARLY EVERY DAY
2. FEELING DOWN, DEPRESSED OR HOPELESS: NEARLY EVERY DAY
9. THOUGHTS THAT YOU WOULD BE BETTER OFF DEAD, OR OF HURTING YOURSELF: NOT AT ALL
8. MOVING OR SPEAKING SO SLOWLY THAT OTHER PEOPLE COULD HAVE NOTICED. OR THE OPPOSITE, BEING SO FIGETY OR RESTLESS THAT YOU HAVE BEEN MOVING AROUND A LOT MORE THAN USUAL: MORE THAN HALF THE DAYS
10. IF YOU CHECKED OFF ANY PROBLEMS, HOW DIFFICULT HAVE THESE PROBLEMS MADE IT FOR YOU TO DO YOUR WORK, TAKE CARE OF THINGS AT HOME, OR GET ALONG WITH OTHER PEOPLE: SOMEWHAT DIFFICULT
7. TROUBLE CONCENTRATING ON THINGS, SUCH AS READING THE NEWSPAPER OR WATCHING TELEVISION: MORE THAN HALF THE DAYS
5. POOR APPETITE OR OVEREATING: NEARLY EVERY DAY
4. FEELING TIRED OR HAVING LITTLE ENERGY: NEARLY EVERY DAY
SUM OF ALL RESPONSES TO PHQ QUESTIONS 1-9: 20
SUM OF ALL RESPONSES TO PHQ9 QUESTIONS 1 AND 2: 4
6. FEELING BAD ABOUT YOURSELF - OR THAT YOU ARE A FAILURE OR HAVE LET YOURSELF OR YOUR FAMILY DOWN: NEARLY EVERY DAY
1. LITTLE INTEREST OR PLEASURE IN DOING THINGS: SEVERAL DAYS

## 2024-05-23 ASSESSMENT — ROUTINE ASSESSMENT OF PATIENT INDEX DATA (RAPID3)
SEVERITY_SCORE: MODERATE SEVERITY (MS)
ON A SCALE OF ONE TO TEN, HOW MUCH PAIN HAVE YOU HAD BECAUSE OF YOUR CONDITION OVER THE PAST WEEK?: 4
WASH_DRY_BODY: WITHOUT ANY DIFFICULTY
DRESS_YOURSELF: WITHOUT ANY DIFFICULTY
WALK_FLAT_GROUND: WITHOUT ANY DIFFICULTY
ON A SCALE OF ONE TO TEN, CONSIDERING ALL THE WAYS IN WHICH ILLNESS AND HEALTH CONDITIONS MAY AFFECT YOU AT THIS TIME, PLEASE INDICATE BELOW HOW YOU ARE DOING:: 6
PARTIPATE_RECREATIONAL_ACTIVITIES: WITH MUCH DIFFICULTY
FN_SCORE: 2
IN_OUT_TRANSPORT: WITH SOME DIFFICULTY
FEELINGS_ANXIETY_NERVOUS: UNABLE TO DO
TOTAL RAPID3 SCORE: 12
GOOD_NIGHTS_SLEEP: WITH MUCH DIFFICULTY
SUM OF QUESTIONS A TO J: 6
LIFT_CUP_TO_MOUTH: WITHOUT ANY DIFFICULTY
ON A SCALE OF ONE TO TEN, CONSIDERING ALL THE WAYS IN WHICH ILLNESS AND HEALTH CONDITIONS MAY AFFECT YOU AT THIS TIME, PLEASE INDICATE BELOW HOW YOU ARE DOING:: 6
WALK_KILOMETERS: WITH SOME DIFFICULTY
FEELINGS_DEPRESSION: WITH MUCH DIFFICULTY
SEVERITY_SCORE: 0
WEIGHTED_TOTAL_SCORE: 4
TURN_FAUCETS_OFF: WITHOUT ANY DIFFICULTY
IN_OUT_BED: WITH SOME DIFFICULTY
PICK_CLOTHES_OFF_FLOOR: WITH SOME DIFFICULTY
ON A SCALE OF ONE TO TEN, HOW MUCH PAIN HAVE YOU HAD BECAUSE OF YOUR CONDITION OVER THE PAST WEEK?: 4

## 2024-05-23 ASSESSMENT — PAIN SCALES - GENERAL: PAINLEVEL: 2

## 2024-05-23 NOTE — PROGRESS NOTES
"Chief Complaint:    This 38 y.o. female presents with the chief complaint of pain, swelling of the hands and ankles, and sunlight - associated rashes.      Subjective:   HPI   Problem:  1: Pain, swelling of the hands and ankles, and sunlight photosensitivity with rash      The patient developed a photosensitive rash at about age 33. The rash was predominantly on the arms, dorsum of the hands, and, most recently, the back of the legs distally and dorsum of the feet and ankles. The rash is pruritic. Sometimes there itching w/o the rash. In the absence of the sunlight, the rash resolves over three to four days. The rash is described as a raised, \"bumpy\" and erythema with central pallor. The patient now keeps her extremities covered to prevent the rash. Interestingly, sun blockers with  does not prevent the rash. The rash has not been evaluated by a dermatologist.       The pain is \"centralized\" to the hands, hips, and ankles. The patient reports swelling of the fingers, not just the joints. She can move the fingers, but they \"feel tight\". The swelling tends to resolve over a few hours. The dorsum of the hands do not swell; no other joint swelling has been experienced.        The ankle swelling occurs with a \"flare\" is defined as \"weird symptoms\": itching, rash (not necessarily with sunlight exposure), pain in hips, back and hands, and difficulty swallowing.         The pain is localized to the low back, hips, ankles and hands, but not a generalized pain. On average, the pain is 5/10 and pain is characterized as deep and aching but is not sharp, piercing, radiating or worsened by activity.         Finally, the patient is fatigued \"all the time\". She has difficulty sleeping, with multiple awakenings due to discomfort. In the AM, the patient is unrested and is never refreshed. The patient tends to feel better during the fall and winter and worse in the spring and summer.          It is relevant to point out that " "the patient has experienced episodes of flushing of the face, often associated with lightheadedness, tachycardia, but not diarrhea or abdominal pain. The patient does have a fluctuating and temporary rash, often associated sunlight.      Review of Systems   Constitutional:  Positive for diaphoresis and fatigue. Negative for chills and fever.   HENT:  Positive for dental problem, mouth sores and sore throat. Negative for facial swelling, hearing loss and tinnitus.    Eyes:  Positive for photophobia. Negative for pain, redness, itching and visual disturbance.   Respiratory: Negative.  Negative for cough and shortness of breath.    Cardiovascular:         Hypertension   Gastrointestinal:  Positive for abdominal distention, abdominal pain and diarrhea. Negative for blood in stool, constipation and nausea.        IBS   Endocrine: Positive for heat intolerance and polyuria.   Genitourinary:  Positive for frequency and urgency. Negative for difficulty urinating, dysuria and hematuria.   Musculoskeletal:  Positive for arthralgias, back pain and myalgias. Negative for joint swelling and neck pain.   Skin:  Positive for rash.   Neurological:  Positive for dizziness, light-headedness and headaches. Negative for weakness.   Hematological: Negative.    Psychiatric/Behavioral:  Positive for agitation, confusion, decreased concentration and sleep disturbance. Negative for hallucinations and suicidal ideas. The patient is nervous/anxious.      Objective:   /80   Pulse 69   Ht 1.626 m (5' 4\")   Wt 78.5 kg (173 lb)   SpO2 96%   BMI 29.70 kg/m²      Physical Exam  Vitals and nursing note reviewed.   Constitutional:       General: She is not in acute distress.     Appearance: Normal appearance. She is normal weight. She is not ill-appearing, toxic-appearing or diaphoretic.   HENT:      Head: Normocephalic.      Mouth/Throat:      Mouth: Mucous membranes are moist.      Pharynx: Oropharynx is clear.   Eyes:      Extraocular " Movements: Extraocular movements intact.      Conjunctiva/sclera: Conjunctivae normal.      Pupils: Pupils are equal, round, and reactive to light.   Neck:      Vascular: No carotid bruit.   Cardiovascular:      Rate and Rhythm: Normal rate and regular rhythm.      Pulses:           Carotid pulses are 2+ on the right side and 2+ on the left side.       Radial pulses are 2+ on the right side and 2+ on the left side.        Dorsalis pedis pulses are 1+ on the right side and 1+ on the left side.        Posterior tibial pulses are 2+ on the right side and 2+ on the left side.      Heart sounds: Normal heart sounds, S1 normal and S2 normal. No murmur heard.     Comments: Feet show some distal toe cyanosis w/feet in dependent position. No fixed Raynaud's.  Musculoskeletal:      Cervical back: Normal range of motion and neck supple. No rigidity or tenderness.      Right lower leg: No edema.      Left lower leg: No edema.   Lymphadenopathy:      Cervical: No cervical adenopathy.   Skin:     General: Skin is warm.      Capillary Refill: Capillary refill takes more than 3 seconds.      Coloration: Skin is not jaundiced or pale.      Findings: No bruising, erythema, lesion or rash.      Comments: Positive scratch test of left forearm.  Mild livedo reticularis of lower extremities.  Face: mild erythema, very mild skin thickening of face lateral to nasolabial folds. No specific rash.    Neurological:      Mental Status: She is alert and oriented to person, place, and time.   Psychiatric:         Behavior: Behavior normal.       Assessment:   Question of mastocytosis - D47.09  Undifferentiated connective tissue disorder - M35.9    Plan:    Mastocytosis - I raise the possibility of mastocytosis to explain some, but not all, of the patient's symptoms. I  plan to pursue this possible diagnosis first, and will obtain in particular, a serum tryptase along with other studies. I have discussed this potential diagnosis with the patient,  and she is in agreement with further laboratory testing.          Jarrell Hernandez MD

## 2024-05-25 ENCOUNTER — LAB (OUTPATIENT)
Dept: LAB | Facility: LAB | Age: 39
End: 2024-05-25
Payer: COMMERCIAL

## 2024-05-25 DIAGNOSIS — D47.09 MASTOCYTOSIS: ICD-10-CM

## 2024-05-25 DIAGNOSIS — M35.9 UNDIFFERENTIATED CONNECTIVE TISSUE DISEASE (MULTI): ICD-10-CM

## 2024-05-25 DIAGNOSIS — R53.83 FATIGUE, UNSPECIFIED TYPE: ICD-10-CM

## 2024-05-25 LAB
ALBUMIN SERPL-MCNC: 4.6 G/DL (ref 3.5–5)
ALP BLD-CCNC: 60 U/L (ref 35–125)
ALT SERPL-CCNC: 16 U/L (ref 5–40)
ANION GAP SERPL CALC-SCNC: 13 MMOL/L
AST SERPL-CCNC: 14 U/L (ref 5–40)
BASOPHILS # BLD AUTO: 0.04 X10*3/UL (ref 0–0.1)
BASOPHILS NFR BLD AUTO: 0.5 %
BILIRUB SERPL-MCNC: 0.3 MG/DL (ref 0.1–1.2)
BUN SERPL-MCNC: 14 MG/DL (ref 8–25)
C3 SERPL-MCNC: 131 MG/DL (ref 87–200)
C4 SERPL-MCNC: 41 MG/DL (ref 10–50)
CALCIUM SERPL-MCNC: 9.1 MG/DL (ref 8.5–10.4)
CHLORIDE SERPL-SCNC: 104 MMOL/L (ref 97–107)
CO2 SERPL-SCNC: 26 MMOL/L (ref 24–31)
CREAT SERPL-MCNC: 0.6 MG/DL (ref 0.4–1.6)
CRP SERPL-MCNC: <0.3 MG/DL (ref 0–2)
EGFRCR SERPLBLD CKD-EPI 2021: >90 ML/MIN/1.73M*2
EOSINOPHIL # BLD AUTO: 0.25 X10*3/UL (ref 0–0.7)
EOSINOPHIL NFR BLD AUTO: 3.2 %
ERYTHROCYTE [DISTWIDTH] IN BLOOD BY AUTOMATED COUNT: 13.6 % (ref 11.5–14.5)
ERYTHROCYTE [SEDIMENTATION RATE] IN BLOOD BY WESTERGREN METHOD: 12 MM/H (ref 0–20)
GLUCOSE SERPL-MCNC: 91 MG/DL (ref 65–99)
HCT VFR BLD AUTO: 41.6 % (ref 36–46)
HGB BLD-MCNC: 13.4 G/DL (ref 12–16)
IMM GRANULOCYTES # BLD AUTO: 0.02 X10*3/UL (ref 0–0.7)
IMM GRANULOCYTES NFR BLD AUTO: 0.3 % (ref 0–0.9)
LYMPHOCYTES # BLD AUTO: 2.82 X10*3/UL (ref 1.2–4.8)
LYMPHOCYTES NFR BLD AUTO: 35.8 %
MCH RBC QN AUTO: 28.6 PG (ref 26–34)
MCHC RBC AUTO-ENTMCNC: 32.2 G/DL (ref 32–36)
MCV RBC AUTO: 89 FL (ref 80–100)
MONOCYTES # BLD AUTO: 0.64 X10*3/UL (ref 0.1–1)
MONOCYTES NFR BLD AUTO: 8.1 %
NEUTROPHILS # BLD AUTO: 4.1 X10*3/UL (ref 1.2–7.7)
NEUTROPHILS NFR BLD AUTO: 52.1 %
NRBC BLD-RTO: 0 /100 WBCS (ref 0–0)
PLATELET # BLD AUTO: 357 X10*3/UL (ref 150–450)
POTASSIUM SERPL-SCNC: 4.4 MMOL/L (ref 3.4–5.1)
PROT SERPL-MCNC: 7 G/DL (ref 5.9–7.9)
PROT SERPL-MCNC: 7 G/DL (ref 6.4–8.2)
RBC # BLD AUTO: 4.69 X10*6/UL (ref 4–5.2)
SODIUM SERPL-SCNC: 143 MMOL/L (ref 133–145)
WBC # BLD AUTO: 7.9 X10*3/UL (ref 4.4–11.3)

## 2024-05-25 PROCEDURE — 86160 COMPLEMENT ANTIGEN: CPT

## 2024-05-25 PROCEDURE — 86334 IMMUNOFIX E-PHORESIS SERUM: CPT

## 2024-05-25 PROCEDURE — 83520 IMMUNOASSAY QUANT NOS NONAB: CPT

## 2024-05-25 PROCEDURE — 83529 ASAY OF INTERLEUKIN-6 (IL-6): CPT

## 2024-05-25 PROCEDURE — 86225 DNA ANTIBODY NATIVE: CPT

## 2024-05-25 PROCEDURE — 85025 COMPLETE CBC W/AUTO DIFF WBC: CPT

## 2024-05-25 PROCEDURE — 86140 C-REACTIVE PROTEIN: CPT

## 2024-05-25 PROCEDURE — 85652 RBC SED RATE AUTOMATED: CPT

## 2024-05-25 PROCEDURE — 86038 ANTINUCLEAR ANTIBODIES: CPT

## 2024-05-25 PROCEDURE — 84155 ASSAY OF PROTEIN SERUM: CPT

## 2024-05-25 PROCEDURE — 84165 PROTEIN E-PHORESIS SERUM: CPT

## 2024-05-25 PROCEDURE — 86235 NUCLEAR ANTIGEN ANTIBODY: CPT

## 2024-05-25 PROCEDURE — 80053 COMPREHEN METABOLIC PANEL: CPT

## 2024-05-25 PROCEDURE — 36415 COLL VENOUS BLD VENIPUNCTURE: CPT

## 2024-05-26 LAB
CENTROMERE B AB SER-ACNC: <0.2 AI
CHROMATIN AB SERPL-ACNC: <0.2 AI
DSDNA AB SER-ACNC: <1 IU/ML
ENA JO1 AB SER QL IA: <0.2 AI
ENA RNP AB SER IA-ACNC: <0.2 AI
ENA SCL70 AB SER QL IA: <0.2 AI
ENA SM AB SER IA-ACNC: <0.2 AI
ENA SM+RNP AB SER QL IA: <0.2 AI
ENA SS-A AB SER IA-ACNC: <0.2 AI
ENA SS-B AB SER IA-ACNC: <0.2 AI
RIBOSOMAL P AB SER-ACNC: <0.2 AI

## 2024-05-27 DIAGNOSIS — L50.1 ACUTE IDIOPATHIC URTICARIA: Primary | ICD-10-CM

## 2024-05-28 LAB — TRYPTASE SERPL-MCNC: 3 UG/L

## 2024-05-29 LAB
ANA SER QL HEP2 SUBST: NEGATIVE
IL6 SERPL-MCNC: <2 PG/ML

## 2024-05-29 RX ORDER — HYDROXYZINE PAMOATE 25 MG/1
CAPSULE ORAL
Qty: 30 CAPSULE | Refills: 0 | Status: SHIPPED | OUTPATIENT
Start: 2024-05-29

## 2024-05-30 LAB
ALBUMIN: 4.4 G/DL (ref 3.4–5)
ALPHA 1 GLOBULIN: 0.3 G/DL (ref 0.2–0.6)
ALPHA 2 GLOBULIN: 0.6 G/DL (ref 0.4–1.1)
BETA GLOBULIN: 0.8 G/DL (ref 0.5–1.2)
GAMMA GLOBULIN: 0.9 G/DL (ref 0.5–1.4)
IMMUNOFIXATION COMMENT: NORMAL
PATH REVIEW - SERUM IMMUNOFIXATION: NORMAL
PATH REVIEW-SERUM PROTEIN ELECTROPHORESIS: NORMAL
PROTEIN ELECTROPHORESIS COMMENT: NORMAL

## 2024-06-05 LAB — C1Q SERPL-MCNC: 13.6 MG/DL (ref 10.3–20.5)

## 2024-06-08 DIAGNOSIS — K21.9 GASTROESOPHAGEAL REFLUX DISEASE WITHOUT ESOPHAGITIS: ICD-10-CM

## 2024-06-08 DIAGNOSIS — R10.10 PAIN OF UPPER ABDOMEN: ICD-10-CM

## 2024-06-10 RX ORDER — OMEPRAZOLE 40 MG/1
40 CAPSULE, DELAYED RELEASE ORAL DAILY
Qty: 90 CAPSULE | Refills: 3 | Status: SHIPPED | OUTPATIENT
Start: 2024-06-10

## 2024-06-12 ENCOUNTER — OFFICE VISIT (OUTPATIENT)
Dept: RHEUMATOLOGY | Facility: CLINIC | Age: 39
End: 2024-06-12
Payer: COMMERCIAL

## 2024-06-12 VITALS
BODY MASS INDEX: 30 KG/M2 | WEIGHT: 174.8 LBS | SYSTOLIC BLOOD PRESSURE: 116 MMHG | DIASTOLIC BLOOD PRESSURE: 68 MMHG | OXYGEN SATURATION: 98 % | HEART RATE: 68 BPM

## 2024-06-12 DIAGNOSIS — M35.9 UNDIFFERENTIATED CONNECTIVE TISSUE DISEASE (MULTI): Primary | ICD-10-CM

## 2024-06-12 PROCEDURE — 99212 OFFICE O/P EST SF 10 MIN: CPT | Performed by: INTERNAL MEDICINE

## 2024-06-12 PROCEDURE — 1036F TOBACCO NON-USER: CPT | Performed by: INTERNAL MEDICINE

## 2024-06-12 PROCEDURE — 3074F SYST BP LT 130 MM HG: CPT | Performed by: INTERNAL MEDICINE

## 2024-06-12 PROCEDURE — 3078F DIAST BP <80 MM HG: CPT | Performed by: INTERNAL MEDICINE

## 2024-06-12 ASSESSMENT — ROUTINE ASSESSMENT OF PATIENT INDEX DATA (RAPID3)
SUM OF QUESTIONS A TO J: 6
SEVERITY_SCORE: 0
SEVERITY_SCORE: MODERATE SEVERITY (MS)
PICK_CLOTHES_OFF_FLOOR: WITH SOME DIFFICULTY
FN_SCORE: 2
IN_OUT_BED: WITH SOME DIFFICULTY
WALK_FLAT_GROUND: WITHOUT ANY DIFFICULTY
ON A SCALE OF ONE TO TEN, HOW MUCH PAIN HAVE YOU HAD BECAUSE OF YOUR CONDITION OVER THE PAST WEEK?: 2.5
IN_OUT_TRANSPORT: WITHOUT ANY DIFFICULTY
FEELINGS_DEPRESSION: UNABLE TO DO
ON A SCALE OF ONE TO TEN, CONSIDERING ALL THE WAYS IN WHICH ILLNESS AND HEALTH CONDITIONS MAY AFFECT YOU AT THIS TIME, PLEASE INDICATE BELOW HOW YOU ARE DOING:: 3.5
ON A SCALE OF ONE TO TEN, HOW MUCH PAIN HAVE YOU HAD BECAUSE OF YOUR CONDITION OVER THE PAST WEEK?: 2.5
ON A SCALE OF ONE TO TEN, CONSIDERING ALL THE WAYS IN WHICH ILLNESS AND HEALTH CONDITIONS MAY AFFECT YOU AT THIS TIME, PLEASE INDICATE BELOW HOW YOU ARE DOING:: 3.5
PARTIPATE_RECREATIONAL_ACTIVITIES: WITH MUCH DIFFICULTY
GOOD_NIGHTS_SLEEP: UNABLE TO DO
TOTAL RAPID3 SCORE: 8
WASH_DRY_BODY: WITHOUT ANY DIFFICULTY
FEELINGS_ANXIETY_NERVOUS: UNABLE TO DO
DRESS_YOURSELF: WITHOUT ANY DIFFICULTY
TURN_FAUCETS_OFF: WITHOUT ANY DIFFICULTY
LIFT_CUP_TO_MOUTH: WITHOUT ANY DIFFICULTY
WEIGHTED_TOTAL_SCORE: 2.67
WALK_KILOMETERS: WITH MUCH DIFFICULTY

## 2024-06-12 ASSESSMENT — ENCOUNTER SYMPTOMS
OCCASIONAL FEELINGS OF UNSTEADINESS: 0
LOSS OF SENSATION IN FEET: 0
DEPRESSION: 0

## 2024-06-12 NOTE — PROGRESS NOTES
Chief Complaint:    This 38 y.o. female presents with the chief complaint of pain, swelling of the hands and ankles, and sunlight-associated rashes.      Subjective:   HPI   Problem:  1: Pain, swelling, and rashes       Since the patient's previous initial consultation on 5/23/2024, there has been no change in her condition. The patient states that her symptoms are identical to those previously detailed. There has been no change in her symptoms.    Objective:   /68   Pulse 68   Wt 79.3 kg (174 lb 12.8 oz)   SpO2 98%   BMI 30.00 kg/m²      Physical Exam    Exam is not repeated at this time.    Instead, I discussed the results of the laboratory testing with the patient. She states having reviewed the results on [facility]. She states understanding of the findings, particularly that all results were negative/normal. Moreover, other tests, such as ADELINE, ADELINE profile, inflammatory markers, and tryptase levels were all negative/normal. She offered no further questions about these results and their significance and meaning.     With regard to the sunlight sensitivity, the patient is recommended to use a high SPF sunblocker (preferably 100), wear long-sleeve blouses and trousers, and to protect her eyes and scalp with a wide-brim hat and sunglasses. I also suggested considering seeing a dermatologist for further assessment and recommendations. She will consider this recommendation.     Finally, given the diagnosis of undifferentiated connective tissue disorder, it is possible for the symptoms that comprise this disorder to recur and even to progress. Therefore, patient is informed that a follow-up visit in the future should the process apparently progress would be appropriate.    Assessment:   Undifferentiated connective tissue disorder - M35.9    Plan:    Follow-up sharifa Hernandez MD

## 2024-07-08 ENCOUNTER — APPOINTMENT (OUTPATIENT)
Dept: RHEUMATOLOGY | Facility: CLINIC | Age: 39
End: 2024-07-08
Payer: COMMERCIAL

## 2024-07-09 DIAGNOSIS — L50.1 ACUTE IDIOPATHIC URTICARIA: ICD-10-CM

## 2024-07-10 RX ORDER — HYDROXYZINE PAMOATE 25 MG/1
CAPSULE ORAL
Qty: 30 CAPSULE | Refills: 3 | Status: SHIPPED | OUTPATIENT
Start: 2024-07-10

## 2024-07-10 NOTE — PROGRESS NOTES
Subjective   Patient ID: Dot Breen is a 38 y.o. female who presents for No chief complaint on file..  HPI  1 YR FUV RYGB   START WT:  203  IDEAL WT: 140 START EXCESS:63 HT: 64 IN  B12 INJECTION ADMINISTERED BY SD TO RT DELTOID   Review of Systems  Bariatric Surgery F/U:          Seen at ER after surgery? No.  Hospital admission? No.  Bariatric reoperation? No.  Bariatric intervention? No.  Was anticoagulation initiated for presumed/confirmed Vein Thrombosis/PE? No.  Was an incisional hernia noted on exam? No.  Sleep Apnea? No.  Still using C-PAP? No.  GERD req. meds? YES.  Hyperlipidemia? No.  Still taking Cholesterol med? .  Hypertension? YES.  Still taking HTN med? YES Number of Anti-hypertensive Medications: 2.  Diabetes? No.  Still taking DM med? No.  Current DM medication type: _____.         CONSTITUTIONAL:          Chills No.  Fatigue No.  Fever No.         CARDIOLOGY:          Negative for dizziness, chest pain, palpitations, shortness of breath.         RESPIRATORY:          Negative for chest congestion, cough, wheezing.         GASTROENTEROLOGY:          Food Intolerance No.  Acid reflux YES.  Abdominal pain YES.  Black stools No.  Constipation YES.  Diarrhea No. ADMITS TO CHANGE IN BOWELS Loss of appetite no.  Nausea No.  Vomiting No.         UROLOGY:          Negative for dysuria, urinary urgency, kidney stones.         PSYCHOLOGY:          Negative for depression, anxiety, high stress level.   Objective   Physical Exam    Assessment/Plan            Radha Quiroga LPN 07/10/24 2:17 PM

## 2024-07-17 ENCOUNTER — LAB (OUTPATIENT)
Dept: LAB | Facility: LAB | Age: 39
End: 2024-07-17
Payer: COMMERCIAL

## 2024-07-17 DIAGNOSIS — K90.9 INTESTINAL MALABSORPTION, UNSPECIFIED TYPE (HHS-HCC): ICD-10-CM

## 2024-07-17 DIAGNOSIS — M35.9 UNDIFFERENTIATED CONNECTIVE TISSUE DISEASE (MULTI): ICD-10-CM

## 2024-07-17 DIAGNOSIS — E53.8 VITAMIN B12 DEFICIENCY: ICD-10-CM

## 2024-07-17 DIAGNOSIS — E55.9 VITAMIN D DEFICIENCY: ICD-10-CM

## 2024-07-17 DIAGNOSIS — E21.3 HYPERPARATHYROIDISM (MULTI): ICD-10-CM

## 2024-07-17 LAB
25(OH)D3 SERPL-MCNC: 24 NG/ML (ref 31–100)
ALBUMIN SERPL-MCNC: 4.7 G/DL (ref 3.5–5)
ALP BLD-CCNC: 61 U/L (ref 35–125)
ALT SERPL-CCNC: 10 U/L (ref 5–40)
ANION GAP SERPL CALC-SCNC: 16 MMOL/L
AST SERPL-CCNC: 15 U/L (ref 5–40)
BASOPHILS # BLD AUTO: 0.05 X10*3/UL (ref 0–0.1)
BASOPHILS NFR BLD AUTO: 0.7 %
BILIRUB SERPL-MCNC: <0.2 MG/DL (ref 0.1–1.2)
BUN SERPL-MCNC: 11 MG/DL (ref 8–25)
CALCIUM SERPL-MCNC: 9.6 MG/DL (ref 8.5–10.4)
CHLORIDE SERPL-SCNC: 103 MMOL/L (ref 97–107)
CO2 SERPL-SCNC: 21 MMOL/L (ref 24–31)
CREAT SERPL-MCNC: 0.7 MG/DL (ref 0.4–1.6)
EGFRCR SERPLBLD CKD-EPI 2021: >90 ML/MIN/1.73M*2
EOSINOPHIL # BLD AUTO: 0.18 X10*3/UL (ref 0–0.7)
EOSINOPHIL NFR BLD AUTO: 2.6 %
ERYTHROCYTE [DISTWIDTH] IN BLOOD BY AUTOMATED COUNT: 12.6 % (ref 11.5–14.5)
FERRITIN SERPL-MCNC: 41 NG/ML (ref 13–150)
FOLATE SERPL-MCNC: 12.9 NG/ML (ref 4.2–19.9)
GLUCOSE SERPL-MCNC: 91 MG/DL (ref 65–99)
HCT VFR BLD AUTO: 41.7 % (ref 36–46)
HGB BLD-MCNC: 13.5 G/DL (ref 12–16)
IMM GRANULOCYTES # BLD AUTO: 0.01 X10*3/UL (ref 0–0.7)
IMM GRANULOCYTES NFR BLD AUTO: 0.1 % (ref 0–0.9)
IRON SATN MFR SERPL: 22 % (ref 12–50)
IRON SERPL-MCNC: 73 UG/DL (ref 30–160)
LYMPHOCYTES # BLD AUTO: 2.6 X10*3/UL (ref 1.2–4.8)
LYMPHOCYTES NFR BLD AUTO: 37 %
MCH RBC QN AUTO: 29.1 PG (ref 26–34)
MCHC RBC AUTO-ENTMCNC: 32.4 G/DL (ref 32–36)
MCV RBC AUTO: 90 FL (ref 80–100)
MONOCYTES # BLD AUTO: 0.56 X10*3/UL (ref 0.1–1)
MONOCYTES NFR BLD AUTO: 8 %
NEUTROPHILS # BLD AUTO: 3.63 X10*3/UL (ref 1.2–7.7)
NEUTROPHILS NFR BLD AUTO: 51.6 %
NRBC BLD-RTO: 0 /100 WBCS (ref 0–0)
PLATELET # BLD AUTO: 353 X10*3/UL (ref 150–450)
POTASSIUM SERPL-SCNC: 5.1 MMOL/L (ref 3.4–5.1)
PROT SERPL-MCNC: 7.4 G/DL (ref 5.9–7.9)
PTH-INTACT SERPL-MCNC: 62.1 PG/ML (ref 18.5–88)
RBC # BLD AUTO: 4.64 X10*6/UL (ref 4–5.2)
SODIUM SERPL-SCNC: 140 MMOL/L (ref 133–145)
TIBC SERPL-MCNC: 337 UG/DL (ref 228–428)
UIBC SERPL-MCNC: 264 UG/DL (ref 110–370)
VIT B12 SERPL-MCNC: 297 PG/ML (ref 211–946)
WBC # BLD AUTO: 7 X10*3/UL (ref 4.4–11.3)

## 2024-07-17 PROCEDURE — 82306 VITAMIN D 25 HYDROXY: CPT

## 2024-07-17 PROCEDURE — 82607 VITAMIN B-12: CPT

## 2024-07-17 PROCEDURE — 86160 COMPLEMENT ANTIGEN: CPT

## 2024-07-17 PROCEDURE — 84630 ASSAY OF ZINC: CPT

## 2024-07-17 PROCEDURE — 82746 ASSAY OF FOLIC ACID SERUM: CPT

## 2024-07-17 PROCEDURE — 82728 ASSAY OF FERRITIN: CPT

## 2024-07-17 PROCEDURE — 80053 COMPREHEN METABOLIC PANEL: CPT

## 2024-07-17 PROCEDURE — 85025 COMPLETE CBC W/AUTO DIFF WBC: CPT

## 2024-07-17 PROCEDURE — 83540 ASSAY OF IRON: CPT

## 2024-07-17 PROCEDURE — 82525 ASSAY OF COPPER: CPT

## 2024-07-17 PROCEDURE — 83970 ASSAY OF PARATHORMONE: CPT

## 2024-07-17 PROCEDURE — 84425 ASSAY OF VITAMIN B-1: CPT

## 2024-07-17 PROCEDURE — 83550 IRON BINDING TEST: CPT

## 2024-07-17 PROCEDURE — 36415 COLL VENOUS BLD VENIPUNCTURE: CPT

## 2024-07-18 ENCOUNTER — APPOINTMENT (OUTPATIENT)
Dept: SURGERY | Facility: CLINIC | Age: 39
End: 2024-07-18
Payer: COMMERCIAL

## 2024-07-18 VITALS
SYSTOLIC BLOOD PRESSURE: 124 MMHG | HEIGHT: 64 IN | HEART RATE: 65 BPM | DIASTOLIC BLOOD PRESSURE: 85 MMHG | WEIGHT: 166 LBS | BODY MASS INDEX: 28.34 KG/M2

## 2024-07-18 DIAGNOSIS — E21.3 HYPERPARATHYROIDISM (MULTI): ICD-10-CM

## 2024-07-18 DIAGNOSIS — E53.8 VITAMIN B12 DEFICIENCY: ICD-10-CM

## 2024-07-18 DIAGNOSIS — R19.8: ICD-10-CM

## 2024-07-18 DIAGNOSIS — K90.9 INTESTINAL MALABSORPTION, UNSPECIFIED TYPE (HHS-HCC): Primary | ICD-10-CM

## 2024-07-18 DIAGNOSIS — E55.9 VITAMIN D DEFICIENCY: ICD-10-CM

## 2024-07-18 PROCEDURE — 3079F DIAST BP 80-89 MM HG: CPT

## 2024-07-18 PROCEDURE — 96372 THER/PROPH/DIAG INJ SC/IM: CPT

## 2024-07-18 PROCEDURE — 3008F BODY MASS INDEX DOCD: CPT

## 2024-07-18 PROCEDURE — 3074F SYST BP LT 130 MM HG: CPT

## 2024-07-18 PROCEDURE — 99214 OFFICE O/P EST MOD 30 MIN: CPT

## 2024-07-18 RX ORDER — ERGOCALCIFEROL 1.25 MG/1
1.25 CAPSULE ORAL
Qty: 12 CAPSULE | Refills: 3 | Status: SHIPPED | OUTPATIENT
Start: 2024-07-28 | End: 2025-07-28

## 2024-07-18 RX ORDER — CYANOCOBALAMIN 1000 UG/ML
1000 INJECTION, SOLUTION INTRAMUSCULAR; SUBCUTANEOUS ONCE
Status: COMPLETED | OUTPATIENT
Start: 2024-07-18 | End: 2024-07-18

## 2024-07-18 ASSESSMENT — PATIENT HEALTH QUESTIONNAIRE - PHQ9
SUM OF ALL RESPONSES TO PHQ9 QUESTIONS 1 AND 2: 0
2. FEELING DOWN, DEPRESSED OR HOPELESS: NOT AT ALL
1. LITTLE INTEREST OR PLEASURE IN DOING THINGS: NOT AT ALL

## 2024-07-18 ASSESSMENT — PAIN SCALES - GENERAL: PAINLEVEL: 0-NO PAIN

## 2024-07-18 NOTE — PROGRESS NOTES
Subjective   Patient ID: Dot Breen is a 38 y.o. female who presents for Follow-up (3 YR FUV RYGB).  KARINA Chris is here for her 3 year follow up after a RYGB. She has lost 59% of her excess weight. She denies reflux. She is taking omeprazole for Fofana's. She tells me that the same things that happened to her that happened in March. She tells me that this happened about 3 weeks ago with LLQ pain. She started with Miralax. 2 weeks ago, she stopped soiled food and only did liquids. 4 days ago she started introducing soft foods. She does not feel hunger. She is not currently exercising due to the pain. She is walking around her neighborhood and is active in her job. She is currently taking a MVI with iron, calcium citrate once daily with vitamin D, and vitamin b12. She tells me that she does not take her B12 daily. She had labwork for this appointment, which we reviewed.      Objective   Physical Exam  Constitutional:       Appearance: Normal appearance.   Eyes:      Pupils: Pupils are equal, round, and reactive to light.   Cardiovascular:      Rate and Rhythm: Normal rate.   Pulmonary:      Effort: Pulmonary effort is normal.   Abdominal:      Palpations: Abdomen is soft.   Musculoskeletal:         General: Normal range of motion.   Skin:     General: Skin is warm and dry.   Neurological:      General: No focal deficit present.      Mental Status: She is alert and oriented to person, place, and time.   Psychiatric:         Mood and Affect: Mood normal.        Latest Reference Range & Units 07/17/24 11:15   GLUCOSE 65 - 99 mg/dL 91   SODIUM 133 - 145 mmol/L 140   POTASSIUM 3.4 - 5.1 mmol/L 5.1   CHLORIDE 97 - 107 mmol/L 103   Bicarbonate 24 - 31 mmol/L 21 (L)   Anion Gap <=19 mmol/L 16   Blood Urea Nitrogen 8 - 25 mg/dL 11   Creatinine 0.40 - 1.60 mg/dL 0.70   EGFR >60 mL/min/1.73m*2 >90   Calcium 8.5 - 10.4 mg/dL 9.6   Albumin 3.5 - 5.0 g/dL 4.7   Alkaline Phosphatase 35 - 125 U/L 61   ALT 5 - 40 U/L 10   AST 5 -  40 U/L 15   Bilirubin Total 0.1 - 1.2 mg/dL <0.2   FERRITIN 13 - 150 ng/mL 41   FOLATE 4.2 - 19.9 ng/mL 12.9   Total Protein 5.9 - 7.9 g/dL 7.4   IRON 30 - 160 ug/dL 73   TIBC 228 - 428 ug/dL 337   UIBC 110 - 370 ug/dL 264   % Saturation 12 - 50 % 22   Vitamin B12 211 - 946 pg/mL 297   Parathyroid Hormone, Intact 18.5 - 88.0 pg/mL 62.1   Vitamin D, 25-Hydroxy, Total 31 - 100 ng/mL 24 (L)   LEUKOCYTES (10*3/UL) IN BLOOD BY AUTOMATED COUNT, Citizen of [place] 4.4 - 11.3 x10*3/uL 7.0   nRBC 0.0 - 0.0 /100 WBCs 0.0   ERYTHROCYTES (10*6/UL) IN BLOOD BY AUTOMATED COUNT, Citizen of [place] 4.00 - 5.20 x10*6/uL 4.64   HEMOGLOBIN 12.0 - 16.0 g/dL 13.5   HEMATOCRIT 36.0 - 46.0 % 41.7   MCV 80 - 100 fL 90   MCH 26.0 - 34.0 pg 29.1   MCHC 32.0 - 36.0 g/dL 32.4   RED CELL DISTRIBUTION WIDTH 11.5 - 14.5 % 12.6   PLATELETS (10*3/UL) IN BLOOD AUTOMATED COUNT, Citizen of [place] 150 - 450 x10*3/uL 353   NEUTROPHILS/100 LEUKOCYTES IN BLOOD BY AUTOMATED COUNT, Citizen of [place] 40.0 - 80.0 % 51.6   Immature Granulocytes %, Automated 0.0 - 0.9 % 0.1   Lymphocytes % 13.0 - 44.0 % 37.0   Monocytes % 2.0 - 10.0 % 8.0   Eosinophils % 0.0 - 6.0 % 2.6   Basophils % 0.0 - 2.0 % 0.7   NEUTROPHILS (10*3/UL) IN BLOOD BY AUTOMATED COUNT, Citizen of [place] 1.20 - 7.70 x10*3/uL 3.63   Immature Granulocytes Absolute, Automated 0.00 - 0.70 x10*3/uL 0.01   Lymphocytes Absolute 1.20 - 4.80 x10*3/uL 2.60   Monocytes Absolute 0.10 - 1.00 x10*3/uL 0.56   Eosinophils Absolute 0.00 - 0.70 x10*3/uL 0.18   Basophils Absolute 0.00 - 0.10 x10*3/uL 0.05     Assessment/Plan   Problem List Items Addressed This Visit             ICD-10-CM    Hyperparathyroidism (Multi) E21.3    Intestinal malabsorption (HHS-HCC) - Primary K90.9    Disorder of abdomen R19.8    Vitamin D deficiency E55.9    Relevant Medications    ergocalciferol (DrisdoL) 1.25 MG (06101 UT) capsule (Start on 7/28/2024)    Vitamin B12 deficiency E53.8    Relevant Medications    cyanocobalamin (Vitamin B-12) injection 1,000 mcg (Completed)     Dot  will follow up with GI in regards to her lower abdominal discomfort. She will contact the office if anything changes or has any concerns. She will start taking a once weekly vitamin D. I also asked her to be more regular with her B12. She will receive an injection today. We will recheck her B12 and vitamin D labs in 6 months. If her B12 is still low, we discussed that she would have to start monthly injections. I reminded her that a RYGB is not compatible with ASA or NSAID use.     MARLON Naylor-CNP 07/18/24 2:58 PM

## 2024-07-19 LAB
COPPER SERPL-MCNC: 100.2 UG/DL (ref 80–155)
ZINC SERPL-MCNC: 71.3 UG/DL (ref 60–120)

## 2024-07-22 LAB — VIT B1 PYROPHOSHATE BLD-SCNC: 108 NMOL/L (ref 70–180)

## 2024-07-26 ENCOUNTER — APPOINTMENT (OUTPATIENT)
Dept: CARDIOLOGY | Facility: CLINIC | Age: 39
End: 2024-07-26
Payer: COMMERCIAL

## 2024-07-26 VITALS
SYSTOLIC BLOOD PRESSURE: 118 MMHG | RESPIRATION RATE: 16 BRPM | OXYGEN SATURATION: 98 % | TEMPERATURE: 98.8 F | WEIGHT: 169 LBS | HEART RATE: 58 BPM | DIASTOLIC BLOOD PRESSURE: 68 MMHG | HEIGHT: 64 IN | BODY MASS INDEX: 28.85 KG/M2

## 2024-07-26 DIAGNOSIS — E78.2 MIXED HYPERLIPIDEMIA: ICD-10-CM

## 2024-07-26 DIAGNOSIS — I10 DIASTOLIC HYPERTENSION: ICD-10-CM

## 2024-07-26 DIAGNOSIS — R00.2 PALPITATIONS: Primary | ICD-10-CM

## 2024-07-26 PROCEDURE — 3078F DIAST BP <80 MM HG: CPT | Performed by: INTERNAL MEDICINE

## 2024-07-26 PROCEDURE — 99213 OFFICE O/P EST LOW 20 MIN: CPT | Performed by: INTERNAL MEDICINE

## 2024-07-26 PROCEDURE — 3074F SYST BP LT 130 MM HG: CPT | Performed by: INTERNAL MEDICINE

## 2024-07-26 PROCEDURE — 3008F BODY MASS INDEX DOCD: CPT | Performed by: INTERNAL MEDICINE

## 2024-07-26 ASSESSMENT — LIFESTYLE VARIABLES
HOW MANY STANDARD DRINKS CONTAINING ALCOHOL DO YOU HAVE ON A TYPICAL DAY: PATIENT DOES NOT DRINK
HOW OFTEN DO YOU HAVE SIX OR MORE DRINKS ON ONE OCCASION: NEVER
AUDIT-C TOTAL SCORE: 0
HOW OFTEN DO YOU HAVE A DRINK CONTAINING ALCOHOL: NEVER
SKIP TO QUESTIONS 9-10: 1

## 2024-07-26 ASSESSMENT — ENCOUNTER SYMPTOMS
LOSS OF SENSATION IN FEET: 0
OCCASIONAL FEELINGS OF UNSTEADINESS: 0
DEPRESSION: 0

## 2024-07-26 ASSESSMENT — PAIN SCALES - GENERAL: PAINLEVEL: 0-NO PAIN

## 2024-07-26 NOTE — PROGRESS NOTES
History of present illness:   38-year-old female history of bariatric surgery and Nissen fundoplication  by Dr. Josep Capellan.  Patient returns to my office for follow-up.  2D echo 4/8/2022 in my office that showed normal ejection fraction no major valve abnormalities.  Patient returns to my office for follow-up.  Has been feeling overall great.  Denies any chest pain shortness of breath palpitations dizziness or lightheadedness.  Very infrequent skipping beat here and there maybe twice a week.    Past Medical History:   Diagnosis Date    HTN (hypertension)     Other specified diabetes mellitus without complications (Multi)     Diabetes mellitus of other type without complication    Personal history of other diseases of the circulatory system     History of hypertension    Personal history of other mental and behavioral disorders 07/18/2017    History of anxiety disorder    Unspecified asthma, uncomplicated ([facility]) 01/04/2017    Asthmatic bronchitis       Past Surgical History:   Procedure Laterality Date    CHOLECYSTECTOMY  2023    HYSTERECTOMY  2023    OTHER SURGICAL HISTORY  06/09/2021    Gastric bypass surgery    OTHER SURGICAL HISTORY  03/2024    OTHER SURGICAL HISTORY      LAPAROSCOPIC LYSIS OF ADHESIONS/ ESOPHAGOGASTRODUODENOSCOPY  (PATY)       Allergies   Allergen Reactions    Vilazodone Rash    Trazodone Rash    Xifaxan [Rifaximin] Rash        reports that she quit smoking about 10 years ago. Her smoking use included cigarettes. She has never been exposed to tobacco smoke. She has never used smokeless tobacco. She reports that she does not drink alcohol and does not use drugs.    Family History   Problem Relation Name Age of Onset    Heart disease Mother  59    Other (CA of lung) Father      No Known Problems Brother      No Known Problems Daughter      No Known Problems Son         Patient's Medications   New Prescriptions    No medications on file   Previous Medications    ACETAMINOPHEN (TYLENOL) 500 MG  TABLET    Take 2 tablets (1,000 mg) by mouth every 6 hours if needed for mild pain (1 - 3).    AMLODIPINE (NORVASC) 5 MG TABLET    Take 1 tablet (5 mg) by mouth once daily.    BISOPROLOL (ZEBETA) 5 MG TABLET    Take 1 tablet (5 mg) by mouth once daily. Takes 1/2 daily    ERGOCALCIFEROL (DRISDOL) 1.25 MG (60182 UT) CAPSULE    Take 1 capsule (1,250 mcg) by mouth 1 (one) time per week. Do not fill before July 28, 2024.    HYDROXYZINE PAMOATE (VISTARIL) 25 MG CAPSULE    TAKE 1 CAPSULE(25 MG) BY MOUTH THREE TIMES DAILY FOR UP TO 10 DAYS AS NEEDED FOR ITCHING    LEVOCETIRIZINE (XYZAL) 5 MG TABLET    Take 1 tablet (5 mg) by mouth once daily at bedtime.    MULTIVIT-MINERALS/FOLIC ACID (WOMEN'S MULTIVITAMIN GUMMIES ORAL)    Take by mouth.    OMEPRAZOLE (PRILOSEC) 40 MG DR CAPSULE    TAKE 1 CAPSULE(40 MG) BY MOUTH DAILY    POLYETHYLENE GLYCOL (GLYCOLAX, MIRALAX) 17 GRAM PACKET    Take 17 g by mouth once daily.    SUCRALFATE (CARAFATE) 100 MG/ML SUSPENSION    Take 10 mL (1 g) by mouth 4 times a day.    TRIAMCINOLONE (KENALOG) 0.1 % CREAM    Apply topically.   Modified Medications    No medications on file   Discontinued Medications    CHOLECALCIFEROL (VITAMIN D-3) 50 MCG (2,000 UNIT) CAPSULE    Take 1 capsule (50 mcg) by mouth once daily.       Objective   Physical Exam  General: Patient in no acute distress   HEENT: Atraumatic normocephalic.  Neck: Supple, jugular venous pressure within normal limit.  No bruits  Lungs: Clear to auscultation bilaterally  Cardiovascular: Regular rate and rhythm, normal heart sounds, no murmurs rubs or gallops  Abdomen: Soft nontender nondistended.  Normal bowel sounds.  Extremities: Warm to touch, no edema.        Lab Review   Lab on 07/17/2024   Component Date Value    C2 Complement 07/17/2024 2.4     Zinc, Serum or Plasma 07/17/2024 71.3     Vitamin D, 25-Hydroxy, T* 07/17/2024 24 (L)     Vitamin B12 07/17/2024 297     Vitamin B1, Whole Blood 07/17/2024 108     Parathyroid Hormone, Int*  07/17/2024 62.1     Folate, Serum 07/17/2024 12.9     Iron 07/17/2024 73     UIBC 07/17/2024 264     TIBC 07/17/2024 337     % Saturation 07/17/2024 22     Ferritin 07/17/2024 41     Copper 07/17/2024 100.2     Glucose 07/17/2024 91     Sodium 07/17/2024 140     Potassium 07/17/2024 5.1     Chloride 07/17/2024 103     Bicarbonate 07/17/2024 21 (L)     Urea Nitrogen 07/17/2024 11     Creatinine 07/17/2024 0.70     eGFR 07/17/2024 >90     Calcium 07/17/2024 9.6     Albumin 07/17/2024 4.7     Alkaline Phosphatase 07/17/2024 61     Total Protein 07/17/2024 7.4     AST 07/17/2024 15     Bilirubin, Total 07/17/2024 <0.2     ALT 07/17/2024 10     Anion Gap 07/17/2024 16     WBC 07/17/2024 7.0     nRBC 07/17/2024 0.0     RBC 07/17/2024 4.64     Hemoglobin 07/17/2024 13.5     Hematocrit 07/17/2024 41.7     MCV 07/17/2024 90     MCH 07/17/2024 29.1     MCHC 07/17/2024 32.4     RDW 07/17/2024 12.6     Platelets 07/17/2024 353     Neutrophils % 07/17/2024 51.6     Immature Granulocytes %,* 07/17/2024 0.1     Lymphocytes % 07/17/2024 37.0     Monocytes % 07/17/2024 8.0     Eosinophils % 07/17/2024 2.6     Basophils % 07/17/2024 0.7     Neutrophils Absolute 07/17/2024 3.63     Immature Granulocytes Ab* 07/17/2024 0.01     Lymphocytes Absolute 07/17/2024 2.60     Monocytes Absolute 07/17/2024 0.56     Eosinophils Absolute 07/17/2024 0.18     Basophils Absolute 07/17/2024 0.05         Assessment/Plan   Patient Active Problem List   Diagnosis    Gastroesophageal reflux disease without esophagitis    GI bleed    Pain of upper abdomen    Surgery follow-up examination    Attention deficit hyperactivity disorder (ADHD), combined type    Bipolar I disorder (Multi)    Posttraumatic stress disorder    NAT (generalized anxiety disorder)    Insomnia related to another mental disorder    Fofana's esophageal ulceration    Biliary dyskinesia    Chronic pain    Chronic pain of right elbow    Inflammatory dermatosis    Diabetes mellitus without  complication (Multi)    Diastolic hypertension    Dizziness and giddiness    Dysmenorrhea    Elevated TSH    Fatigue    Gastroenteritis    Globus sensation    Granuloma inguinale    Hyperlipidemia    Hyperparathyroidism (Multi)    Intestinal malabsorption (HHS-HCC)    Irregular menses    Jaw pain    Jejunal ulcer    Laceration of finger    Laryngopharyngeal reflux    Lipoma of right lower extremity    Memory impairment    Menorrhagia    Metrorrhagia    Moderate episode of recurrent major depressive disorder (Multi)    Muscle twitch    Constipation    Disorder of abdomen    Nausea    Obesity    Endometriosis    Obstructive sleep apnea    Otitis media    Palpitations    Panic attack    Panic disorder without agoraphobia    Abnormal taste in mouth    Paresthesia    Previous  delivery, antepartum condition or complication (HHS-HCC)    Pyrexia of unknown origin    Right elbow tendonitis    Strain of unspecified muscle, fascia and tendon at shoulder and upper arm level, right arm, initial encounter    Stress incontinence in female    Throat clearing    Vitamin D deficiency    Epigastric pain    Hip pain    Backache    Arthralgia    Back strain    Acute gastric ulcer without hemorrhage or perforation    Acute allergic rhinitis due to pollen    Acquired hypothyroidism    Abnormal vaginal bleeding    Vitamin B12 deficiency       38-year-old female history of bariatric surgery and Nissen fundoplication  by Dr. Josep Capellan.  Patient returns to my office for follow-up.  2D echo 2022 in my office that showed normal ejection fraction no major valve abnormalities.  Patient returns to my office for follow-up.  Has been feeling overall great.  Denies any chest pain shortness of breath palpitations dizziness or lightheadedness.  Very infrequent skipping beat here and there maybe twice a week.  Patient blood pressure and heart rate well-controlled.  At this point my recommendation is to continue current medications.  I will  see her in a year.  If her blood pressure starts being on the low side or she is dizzy she may stop her amlodipine.  Will follow-up in a year.    Bharath Castaneda MD

## 2024-07-28 LAB — C2 SERPL-MCNC: 2.4 MG/DL (ref 1.6–4)

## 2024-08-19 ENCOUNTER — APPOINTMENT (OUTPATIENT)
Dept: ALLERGY | Facility: CLINIC | Age: 39
End: 2024-08-19
Payer: COMMERCIAL

## 2024-08-19 ENCOUNTER — APPOINTMENT (OUTPATIENT)
Dept: SURGERY | Facility: CLINIC | Age: 39
End: 2024-08-19
Payer: COMMERCIAL

## 2024-08-19 DIAGNOSIS — L50.1 ACUTE IDIOPATHIC URTICARIA: ICD-10-CM

## 2024-08-19 DIAGNOSIS — L20.89 OTHER ATOPIC DERMATITIS: ICD-10-CM

## 2024-08-19 PROCEDURE — 99204 OFFICE O/P NEW MOD 45 MIN: CPT | Performed by: ALLERGY & IMMUNOLOGY

## 2024-08-19 NOTE — PROGRESS NOTES
Subjective   Patient ID:   [id number]   Dot Breen is a 38 y.o. female who presents for No chief complaint on file..    No chief complaint on file.         HPI  This patient is here to evaluate for:  Itching since October '2023  Had hysterectomy April '23 and repair of the surgery in October '23  Itching was 8 weeks later.  She still has her ovaries; no known hormone changes but she can get warm.     Location:  Ankles, shin.  Scratches to point of bleeding.     If scratches, then a raised welt    Saw derm, pcp, rheumatology  She did get steroid shots initially - not sure if helped- but not allowed to get future steroids due to history of gastric bypass    Dermatology recommended a daily levocetirizine.  She is better by 75%  May need to take hydroxyzine 1x/week and also steroid cream.     The patient reports onset of urticaria beginning:  Duration of individual hives:   <24 hr  Burning:  no  Bruising: no     Medications that are taken for hives: as above,   Also ice    The patient states urticaria has worsened with exposure to:   New medications: no; amlodipine started a few months ago AFTER the itching.   Infection: no  Hot or cold temps: ok, but used to be a problem  Pressure: no  Exercise: she gets hot afterwards but can walk without problems.   Use of ASA, NSAIDS, or alcohol: doesn't take nsaids or etoh.  Particular foods:  no     Have you had facial swelling, tongue swelling, throat swelling, trouble swallowing, abdominal cramping, cough, wheeze, or trouble breathing:       Any changes in medication, diet, soap, fabric softener, or personal products:   No    Did you have joint pain, fever, abdominal pain, diarrhea, difficulty in swallowing or breathing associated with the hives:   no  She does have a post nasal drainage drip, clearing her throat.     Do you have history of liver disease, thyroid disease, or autoimmune disease: no    Is there a family history of urticaria or swelling:  No but son had hives  from penicillin and serum sickness as a kid.     Pmhx: venom sting - hives, possible throat symptoms but has not been stung as an adult.       Review of Systems   Constitutional:         Lifelong loose stools, seen by GI. No new symptoms    All other systems reviewed and are negative.        Objective     There were no vitals taken for this visit.     Physical Exam  Constitutional:       General: She is not in acute distress.     Appearance: Normal appearance. She is not ill-appearing.   HENT:      Head: Normocephalic and atraumatic.      Right Ear: Tympanic membrane, ear canal and external ear normal.      Left Ear: Tympanic membrane, ear canal and external ear normal.      Nose: Nose normal. No congestion or rhinorrhea.      Mouth/Throat:      Mouth: Mucous membranes are moist.      Pharynx: Oropharynx is clear. No oropharyngeal exudate or posterior oropharyngeal erythema.   Eyes:      General:         Right eye: No discharge.         Left eye: No discharge.      Conjunctiva/sclera: Conjunctivae normal.   Cardiovascular:      Rate and Rhythm: Normal rate and regular rhythm.      Heart sounds: Normal heart sounds. No murmur heard.     No friction rub. No gallop.   Pulmonary:      Effort: Pulmonary effort is normal. No respiratory distress.      Breath sounds: Normal breath sounds. No stridor. No wheezing, rhonchi or rales.   Chest:      Chest wall: No tenderness.   Abdominal:      General: Abdomen is flat.      Palpations: Abdomen is soft.   Musculoskeletal:         General: Normal range of motion.      Cervical back: Normal range of motion and neck supple.   Lymphadenopathy:      Cervical: No cervical adenopathy.   Skin:     General: Skin is warm and dry.      Findings: No erythema, lesion or rash.   Neurological:      General: No focal deficit present.      Mental Status: She is alert. Mental status is at baseline.   Psychiatric:         Mood and Affect: Mood normal.         Behavior: Behavior normal.          Thought Content: Thought content normal.         Judgment: Judgment normal.            Current Outpatient Medications   Medication Sig Dispense Refill    acetaminophen (Tylenol) 500 mg tablet Take 2 tablets (1,000 mg) by mouth every 6 hours if needed for mild pain (1 - 3).      amLODIPine (Norvasc) 5 mg tablet Take 1 tablet (5 mg) by mouth once daily. 90 tablet 3    bisoprolol (Zebeta) 5 mg tablet Take 1 tablet (5 mg) by mouth once daily. Takes 1/2 daily 90 tablet 3    ergocalciferol (DrisdoL) 1.25 MG (31394 UT) capsule Take 1 capsule (1,250 mcg) by mouth 1 (one) time per week. Do not fill before July 28, 2024. 12 capsule 3    hydrOXYzine pamoate (Vistaril) 25 mg capsule TAKE 1 CAPSULE(25 MG) BY MOUTH THREE TIMES DAILY FOR UP TO 10 DAYS AS NEEDED FOR ITCHING 30 capsule 3    levocetirizine (Xyzal) 5 mg tablet Take 1 tablet (5 mg) by mouth once daily at bedtime.      multivit-minerals/folic acid (WOMEN'S MULTIVITAMIN GUMMIES ORAL) Take by mouth.      omeprazole (PriLOSEC) 40 mg DR capsule TAKE 1 CAPSULE(40 MG) BY MOUTH DAILY 90 capsule 3    polyethylene glycol (Glycolax, Miralax) 17 gram packet Take 17 g by mouth once daily.      triamcinolone (Kenalog) 0.1 % cream Apply topically.       No current facility-administered medications for this visit.       Summary of the labs over the past 6 months:    Lab on 07/17/2024   Component Date Value Ref Range Status    C2 Complement 07/17/2024 2.4  1.6 - 4.0 mg/dL Final    Zinc, Serum or Plasma 07/17/2024 71.3  60.0 - 120.0 ug/dL Final    Vitamin D, 25-Hydroxy, Total 07/17/2024 24 (L)  31 - 100 ng/mL Final    Vitamin B12 07/17/2024 297  211 - 946 pg/mL Final    Vitamin B1, Whole Blood 07/17/2024 108  70 - 180 nmol/L Final    Parathyroid Hormone, Intact 07/17/2024 62.1  18.5 - 88.0 pg/mL Final    Folate, Serum 07/17/2024 12.9  4.2 - 19.9 ng/mL Final    Iron 07/17/2024 73  30 - 160 ug/dL Final    UIBC 07/17/2024 264  110 - 370 ug/dL Final    TIBC 07/17/2024 337  228 - 428 ug/dL  Final    % Saturation 07/17/2024 22  12 - 50 % Final    Ferritin 07/17/2024 41  13 - 150 ng/mL Final    Copper 07/17/2024 100.2  80.0 - 155.0 ug/dL Final    Glucose 07/17/2024 91  65 - 99 mg/dL Final    Sodium 07/17/2024 140  133 - 145 mmol/L Final    Potassium 07/17/2024 5.1  3.4 - 5.1 mmol/L Final    Chloride 07/17/2024 103  97 - 107 mmol/L Final    Bicarbonate 07/17/2024 21 (L)  24 - 31 mmol/L Final    Urea Nitrogen 07/17/2024 11  8 - 25 mg/dL Final    Creatinine 07/17/2024 0.70  0.40 - 1.60 mg/dL Final    eGFR 07/17/2024 >90  >60 mL/min/1.73m*2 Final    Calcium 07/17/2024 9.6  8.5 - 10.4 mg/dL Final    Albumin 07/17/2024 4.7  3.5 - 5.0 g/dL Final    Alkaline Phosphatase 07/17/2024 61  35 - 125 U/L Final    Total Protein 07/17/2024 7.4  5.9 - 7.9 g/dL Final    AST 07/17/2024 15  5 - 40 U/L Final    Bilirubin, Total 07/17/2024 <0.2  0.1 - 1.2 mg/dL Final    ALT 07/17/2024 10  5 - 40 U/L Final    Anion Gap 07/17/2024 16  <=19 mmol/L Final    WBC 07/17/2024 7.0  4.4 - 11.3 x10*3/uL Final    nRBC 07/17/2024 0.0  0.0 - 0.0 /100 WBCs Final    RBC 07/17/2024 4.64  4.00 - 5.20 x10*6/uL Final    Hemoglobin 07/17/2024 13.5  12.0 - 16.0 g/dL Final    Hematocrit 07/17/2024 41.7  36.0 - 46.0 % Final    MCV 07/17/2024 90  80 - 100 fL Final    MCH 07/17/2024 29.1  26.0 - 34.0 pg Final    MCHC 07/17/2024 32.4  32.0 - 36.0 g/dL Final    RDW 07/17/2024 12.6  11.5 - 14.5 % Final    Platelets 07/17/2024 353  150 - 450 x10*3/uL Final    Neutrophils % 07/17/2024 51.6  40.0 - 80.0 % Final    Immature Granulocytes %, Automated 07/17/2024 0.1  0.0 - 0.9 % Final    Lymphocytes % 07/17/2024 37.0  13.0 - 44.0 % Final    Monocytes % 07/17/2024 8.0  2.0 - 10.0 % Final    Eosinophils % 07/17/2024 2.6  0.0 - 6.0 % Final    Basophils % 07/17/2024 0.7  0.0 - 2.0 % Final    Neutrophils Absolute 07/17/2024 3.63  1.20 - 7.70 x10*3/uL Final    Immature Granulocytes Absolute, Au* 07/17/2024 0.01  0.00 - 0.70 x10*3/uL Final    Lymphocytes Absolute  07/17/2024 2.60  1.20 - 4.80 x10*3/uL Final    Monocytes Absolute 07/17/2024 0.56  0.10 - 1.00 x10*3/uL Final    Eosinophils Absolute 07/17/2024 0.18  0.00 - 0.70 x10*3/uL Final    Basophils Absolute 07/17/2024 0.05  0.00 - 0.10 x10*3/uL Final   Lab on 05/25/2024   Component Date Value Ref Range Status    Tryptase 05/25/2024 3.0  <=10.9 ug/L Final    WBC 05/25/2024 7.9  4.4 - 11.3 x10*3/uL Final    nRBC 05/25/2024 0.0  0.0 - 0.0 /100 WBCs Final    RBC 05/25/2024 4.69  4.00 - 5.20 x10*6/uL Final    Hemoglobin 05/25/2024 13.4  12.0 - 16.0 g/dL Final    Hematocrit 05/25/2024 41.6  36.0 - 46.0 % Final    MCV 05/25/2024 89  80 - 100 fL Final    MCH 05/25/2024 28.6  26.0 - 34.0 pg Final    MCHC 05/25/2024 32.2  32.0 - 36.0 g/dL Final    RDW 05/25/2024 13.6  11.5 - 14.5 % Final    Platelets 05/25/2024 357  150 - 450 x10*3/uL Final    Neutrophils % 05/25/2024 52.1  40.0 - 80.0 % Final    Immature Granulocytes %, Automated 05/25/2024 0.3  0.0 - 0.9 % Final    Lymphocytes % 05/25/2024 35.8  13.0 - 44.0 % Final    Monocytes % 05/25/2024 8.1  2.0 - 10.0 % Final    Eosinophils % 05/25/2024 3.2  0.0 - 6.0 % Final    Basophils % 05/25/2024 0.5  0.0 - 2.0 % Final    Neutrophils Absolute 05/25/2024 4.10  1.20 - 7.70 x10*3/uL Final    Immature Granulocytes Absolute, Au* 05/25/2024 0.02  0.00 - 0.70 x10*3/uL Final    Lymphocytes Absolute 05/25/2024 2.82  1.20 - 4.80 x10*3/uL Final    Monocytes Absolute 05/25/2024 0.64  0.10 - 1.00 x10*3/uL Final    Eosinophils Absolute 05/25/2024 0.25  0.00 - 0.70 x10*3/uL Final    Basophils Absolute 05/25/2024 0.04  0.00 - 0.10 x10*3/uL Final    Sedimentation Rate 05/25/2024 12  0 - 20 mm/h Final    C1Q Complement 05/25/2024 13.6  10.3 - 20.5 mg/dL Final    C3 Complement 05/25/2024 131  87 - 200 mg/dL Final    C4 Complement 05/25/2024 41  10 - 50 mg/dL Final    C-Reactive Protein 05/25/2024 <0.30  0.00 - 2.00 mg/dL Final    Glucose 05/25/2024 91  65 - 99 mg/dL Final    Sodium 05/25/2024 143  133 -  145 mmol/L Final    Potassium 05/25/2024 4.4  3.4 - 5.1 mmol/L Final    Chloride 05/25/2024 104  97 - 107 mmol/L Final    Bicarbonate 05/25/2024 26  24 - 31 mmol/L Final    Urea Nitrogen 05/25/2024 14  8 - 25 mg/dL Final    Creatinine 05/25/2024 0.60  0.40 - 1.60 mg/dL Final    eGFR 05/25/2024 >90  >60 mL/min/1.73m*2 Final    Calcium 05/25/2024 9.1  8.5 - 10.4 mg/dL Final    Albumin 05/25/2024 4.6  3.5 - 5.0 g/dL Final    Alkaline Phosphatase 05/25/2024 60  35 - 125 U/L Final    Total Protein 05/25/2024 7.0  5.9 - 7.9 g/dL Final    AST 05/25/2024 14  5 - 40 U/L Final    Bilirubin, Total 05/25/2024 0.3  0.1 - 1.2 mg/dL Final    ALT 05/25/2024 16  5 - 40 U/L Final    Anion Gap 05/25/2024 13  <=19 mmol/L Final    Interleukin 6 05/25/2024 <2.0  <=2.0 pg/mL Final    Total Protein 05/25/2024 7.0  6.4 - 8.2 g/dL Final    Albumin 05/25/2024 4.4  3.4 - 5.0 g/dL Final    Alpha 1 Globulin 05/25/2024 0.3  0.2 - 0.6 g/dL Final    Alpha 2 Globulin 05/25/2024 0.6  0.4 - 1.1 g/dL Final    Beta Globulin 05/25/2024 0.8  0.5 - 1.2 g/dL Final    Gamma 05/25/2024 0.9  0.5 - 1.4 g/dL Final    Protein Electrophoresis Comment 05/25/2024 Normal.   Final    Immunofixation Comment 05/25/2024 No monoclonal protein detected by immunofixation.   Final    Path Review - Serum Protein Electr* 05/25/2024 Reviewed and approved by LAZARUS PETTY on 5/30/24 at 11:43 PM.       Final    Path Review - Serum Immunofixation 05/25/2024 Reviewed and approved by LAZARUS PTETY on 5/30/24 at 11:43 PM.       Final    ADELINE 05/25/2024 Negative  Negative Final    Anti-SM 05/25/2024 <0.2  <1.0 AI Final    Anti-RNP 05/25/2024 <0.2  <1.0 AI Final    Anti-SM/RNP 05/25/2024 <0.2  <1.0 AI Final    Anti-SSA 05/25/2024 <0.2  <1.0 AI Final    Anti-SSB 05/25/2024 <0.2  <1.0 AI Final    Anti-SCL-70 05/25/2024 <0.2  <1.0 AI Final    Anti-MERLIN-1 IgG 05/25/2024 <0.2  <1.0 AI Final    Anti-Chromatin 05/25/2024 <0.2  <1.0 AI Final    Anti-Centromere 05/25/2024 <0.2  <1.0 AI Final     "ANTI-RIBOSOMAL P 05/25/2024 <0.2  <1.0 AI Final    Anti-DNA (DS) 05/25/2024 <1.0  <5.0 IU/mL Final   Hospital Outpatient Visit on 05/02/2024   Component Date Value Ref Range Status    POCT Glucose 05/02/2024 82  74 - 99 mg/dL Final    Case Report 05/02/2024    Final                    Value:Surgical Pathology                                Case: [id number]                                  Authorizing Provider:  Ken Kulkarni MD          Collected:           05/02/2024 0959              Ordering Location:     [facility]       Received:            05/02/2024 1121                                     Center                                                                       Pathologist:           Ramesh Padilla MD                                                           Specimen:    STOMACH ANTRUM BIOPSY, r/o H Pylori                                                        FINAL DIAGNOSIS 05/02/2024    Final                    Value:A. STOMACH ANTRUM BIOPSY:                           -- Gastric oxyntic-type mucosa with mild reactive epithelial change.                            -- No Helicobacter pylori organisms identified on routine stained slides.                                  05/02/2024    Final                    Value:By the signature on this report, the individual or group listed as making                           the Final Interpretation/Diagnosis certifies that they have reviewed this                           case.     Clinical History 05/02/2024    Final                    Value:Gastroesophageal reflux disease without esophagitis (K21.9)    Gross Description 05/02/2024    Final                    Value:A: Received in formalin labeled with the patient's name and hospital                           number and \"stomach antrum biopsy rule out H. pylori\", are 2 irregular and                           tan soft tissue fragments measuring 0.3 cm and 0.5 cm in greatest                           " dimensions.  The specimen is submitted in toto in 1 cassette.                          [facility]                                                    Gross dissection performed at:                          [facility]                          [address] [place] [address]                          Phone: [phone]  Fax: [phone]   Admission on 03/31/2024, Discharged on 03/31/2024   Component Date Value Ref Range Status    Color, Urine 03/31/2024 Light-Yellow  Light-Yellow, Yellow, Dark-Yellow Final    Appearance, Urine 03/31/2024 Clear  Clear Final    Specific Gravity, Urine 03/31/2024 1.016  1.005 - 1.035 Final    pH, Urine 03/31/2024 6.5  5.0, 5.5, 6.0, 6.5, 7.0, 7.5, 8.0 Final    Protein, Urine 03/31/2024 20 (TRACE)  NEGATIVE, 10 (TRACE), 20 (TRACE) mg/dL Final    Glucose, Urine 03/31/2024 Normal  Normal mg/dL Final    Blood, Urine 03/31/2024 NEGATIVE  NEGATIVE Final    Ketones, Urine 03/31/2024 NEGATIVE  NEGATIVE mg/dL Final    Bilirubin, Urine 03/31/2024 NEGATIVE  NEGATIVE Final    Urobilinogen, Urine 03/31/2024 Normal  Normal mg/dL Final    Nitrite, Urine 03/31/2024 NEGATIVE  NEGATIVE Final    Leukocyte Esterase, Urine 03/31/2024 NEGATIVE  NEGATIVE Final    Glucose 03/31/2024 117 (H)  65 - 99 mg/dL Final    Sodium 03/31/2024 137  133 - 145 mmol/L Final    Potassium 03/31/2024 5.5 (H)  3.4 - 5.1 mmol/L Final    Chloride 03/31/2024 102  97 - 107 mmol/L Final    Bicarbonate 03/31/2024 27  24 - 31 mmol/L Final    Urea Nitrogen 03/31/2024 12  8 - 25 mg/dL Final    Creatinine 03/31/2024 0.70  0.40 - 1.60 mg/dL Final    eGFR 03/31/2024 >90  >60 mL/min/1.73m*2 Final    Calcium 03/31/2024 9.2  8.5 - 10.4 mg/dL Final    Albumin 03/31/2024 4.5  3.5 - 5.0 g/dL Final    Alkaline Phosphatase 03/31/2024 68  35 - 125 U/L Final    Total Protein 03/31/2024 7.3  5.9 - 7.9 g/dL Final    AST 03/31/2024 16  5 - 40 U/L Final    Bilirubin, Total 03/31/2024 <0.2   0.1 - 1.2 mg/dL Final    ALT 03/31/2024 20  5 - 40 U/L Final    Anion Gap 03/31/2024 8  <=19 mmol/L Final    Lipase 03/31/2024 52  16 - 63 U/L Final    WBC 03/31/2024 7.4  4.4 - 11.3 x10*3/uL Final    nRBC 03/31/2024 0.0  0.0 - 0.0 /100 WBCs Final    RBC 03/31/2024 4.49  4.00 - 5.20 x10*6/uL Final    Hemoglobin 03/31/2024 12.9  12.0 - 16.0 g/dL Final    Hematocrit 03/31/2024 40.0  36.0 - 46.0 % Final    MCV 03/31/2024 89  80 - 100 fL Final    MCH 03/31/2024 28.7  26.0 - 34.0 pg Final    MCHC 03/31/2024 32.3  32.0 - 36.0 g/dL Final    RDW 03/31/2024 13.4  11.5 - 14.5 % Final    Platelets 03/31/2024 391  150 - 450 x10*3/uL Final    HCG, Urine 03/31/2024 NEGATIVE  NEGATIVE Final    WBC, Urine 03/31/2024 NONE  1-5, NONE /HPF Final    RBC, Urine 03/31/2024 1-2  NONE, 1-2, 3-5 /HPF Final    Mucus, Urine 03/31/2024 FEW  Reference range not established. /LPF Final    Ventricular Rate 03/31/2024 59  BPM Final    Atrial Rate 03/31/2024 59  BPM Final    NM Interval 03/31/2024 148  ms Final    QRS Duration 03/31/2024 82  ms Final    QT Interval 03/31/2024 410  ms Final    QTC Calculation(Bazett) 03/31/2024 405  ms Final    P Axis 03/31/2024 45  degrees Final    R Axis 03/31/2024 15  degrees Final    T Axis 03/31/2024 36  degrees Final    QRS Count 03/31/2024 9  beats Final    Q Onset 03/31/2024 220  ms Final    P Onset 03/31/2024 146  ms Final    P Offset 03/31/2024 195  ms Final    T Offset 03/31/2024 425  ms Final    QTC Fredericia 03/31/2024 407  ms Final   Lab on 03/30/2024   Component Date Value Ref Range Status    WBC 03/30/2024 6.6  4.4 - 11.3 x10*3/uL Final    nRBC 03/30/2024 0.0  0.0 - 0.0 /100 WBCs Final    RBC 03/30/2024 4.50  4.00 - 5.20 x10*6/uL Final    Hemoglobin 03/30/2024 12.8  12.0 - 16.0 g/dL Final    Hematocrit 03/30/2024 41.4  36.0 - 46.0 % Final    MCV 03/30/2024 92  80 - 100 fL Final    MCH 03/30/2024 28.4  26.0 - 34.0 pg Final    MCHC 03/30/2024 30.9 (L)  32.0 - 36.0 g/dL Final    RDW 03/30/2024 13.4   11.5 - 14.5 % Final    Platelets 03/30/2024 353  150 - 450 x10*3/uL Final    Neutrophils % 03/30/2024 46.5  40.0 - 80.0 % Final    Immature Granulocytes %, Automated 03/30/2024 0.3  0.0 - 0.9 % Final    Lymphocytes % 03/30/2024 38.4  13.0 - 44.0 % Final    Monocytes % 03/30/2024 8.7  2.0 - 10.0 % Final    Eosinophils % 03/30/2024 5.0  0.0 - 6.0 % Final    Basophils % 03/30/2024 1.1  0.0 - 2.0 % Final    Neutrophils Absolute 03/30/2024 3.09  1.20 - 7.70 x10*3/uL Final    Immature Granulocytes Absolute, Au* 03/30/2024 0.02  0.00 - 0.70 x10*3/uL Final    Lymphocytes Absolute 03/30/2024 2.55  1.20 - 4.80 x10*3/uL Final    Monocytes Absolute 03/30/2024 0.58  0.10 - 1.00 x10*3/uL Final    Eosinophils Absolute 03/30/2024 0.33  0.00 - 0.70 x10*3/uL Final    Basophils Absolute 03/30/2024 0.07  0.00 - 0.10 x10*3/uL Final    Glucose 03/30/2024 85  65 - 99 mg/dL Final    Sodium 03/30/2024 141  133 - 145 mmol/L Final    Potassium 03/30/2024 4.9  3.4 - 5.1 mmol/L Final    Chloride 03/30/2024 102  97 - 107 mmol/L Final    Bicarbonate 03/30/2024 27  24 - 31 mmol/L Final    Urea Nitrogen 03/30/2024 10  8 - 25 mg/dL Final    Creatinine 03/30/2024 0.70  0.40 - 1.60 mg/dL Final    eGFR 03/30/2024 >90  >60 mL/min/1.73m*2 Final    Calcium 03/30/2024 9.4  8.5 - 10.4 mg/dL Final    Anion Gap 03/30/2024 12  <=19 mmol/L Final   Admission on 03/13/2024, Discharged on 03/15/2024   Component Date Value Ref Range Status    WBC 03/13/2024 12.9 (H)  4.4 - 11.3 x10*3/uL Final    nRBC 03/13/2024 0.0  0.0 - 0.0 /100 WBCs Final    RBC 03/13/2024 3.94 (L)  4.00 - 5.20 x10*6/uL Final    Hemoglobin 03/13/2024 11.2 (L)  12.0 - 16.0 g/dL Final    Hematocrit 03/13/2024 35.0 (L)  36.0 - 46.0 % Final    MCV 03/13/2024 89  80 - 100 fL Final    MCH 03/13/2024 28.4  26.0 - 34.0 pg Final    MCHC 03/13/2024 32.0  32.0 - 36.0 g/dL Final    RDW 03/13/2024 11.6  11.5 - 14.5 % Final    Platelets 03/13/2024 480 (H)  150 - 450 x10*3/uL Final    Neutrophils % 03/13/2024  71.4  40.0 - 80.0 % Final    Immature Granulocytes %, Automated 03/13/2024 0.5  0.0 - 0.9 % Final    Lymphocytes % 03/13/2024 19.5  13.0 - 44.0 % Final    Monocytes % 03/13/2024 7.1  2.0 - 10.0 % Final    Eosinophils % 03/13/2024 1.1  0.0 - 6.0 % Final    Basophils % 03/13/2024 0.4  0.0 - 2.0 % Final    Neutrophils Absolute 03/13/2024 9.23 (H)  1.20 - 7.70 x10*3/uL Final    Immature Granulocytes Absolute, Au* 03/13/2024 0.06  0.00 - 0.70 x10*3/uL Final    Lymphocytes Absolute 03/13/2024 2.51  1.20 - 4.80 x10*3/uL Final    Monocytes Absolute 03/13/2024 0.91  0.10 - 1.00 x10*3/uL Final    Eosinophils Absolute 03/13/2024 0.14  0.00 - 0.70 x10*3/uL Final    Basophils Absolute 03/13/2024 0.05  0.00 - 0.10 x10*3/uL Final    WBC 03/14/2024 10.6  4.4 - 11.3 x10*3/uL Final    nRBC 03/14/2024 0.0  0.0 - 0.0 /100 WBCs Final    RBC 03/14/2024 3.81 (L)  4.00 - 5.20 x10*6/uL Final    Hemoglobin 03/14/2024 10.8 (L)  12.0 - 16.0 g/dL Final    Hematocrit 03/14/2024 33.6 (L)  36.0 - 46.0 % Final    MCV 03/14/2024 88  80 - 100 fL Final    MCH 03/14/2024 28.3  26.0 - 34.0 pg Final    MCHC 03/14/2024 32.1  32.0 - 36.0 g/dL Final    RDW 03/14/2024 11.6  11.5 - 14.5 % Final    Platelets 03/14/2024 408  150 - 450 x10*3/uL Final    Neutrophils % 03/14/2024 71.0  40.0 - 80.0 % Final    Immature Granulocytes %, Automated 03/14/2024 0.4  0.0 - 0.9 % Final    Lymphocytes % 03/14/2024 20.8  13.0 - 44.0 % Final    Monocytes % 03/14/2024 6.2  2.0 - 10.0 % Final    Eosinophils % 03/14/2024 1.1  0.0 - 6.0 % Final    Basophils % 03/14/2024 0.5  0.0 - 2.0 % Final    Neutrophils Absolute 03/14/2024 7.52  1.20 - 7.70 x10*3/uL Final    Immature Granulocytes Absolute, Au* 03/14/2024 0.04  0.00 - 0.70 x10*3/uL Final    Lymphocytes Absolute 03/14/2024 2.21  1.20 - 4.80 x10*3/uL Final    Monocytes Absolute 03/14/2024 0.66  0.10 - 1.00 x10*3/uL Final    Eosinophils Absolute 03/14/2024 0.12  0.00 - 0.70 x10*3/uL Final    Basophils Absolute 03/14/2024 0.05   0.00 - 0.10 x10*3/uL Final    Glucose 03/14/2024 89  65 - 99 mg/dL Final    Sodium 03/14/2024 139  133 - 145 mmol/L Final    Potassium 03/14/2024 3.9  3.4 - 5.1 mmol/L Final    Chloride 03/14/2024 104  97 - 107 mmol/L Final    Bicarbonate 03/14/2024 25  24 - 31 mmol/L Final    Urea Nitrogen 03/14/2024 6 (L)  8 - 25 mg/dL Final    Creatinine 03/14/2024 0.60  0.40 - 1.60 mg/dL Final    eGFR 03/14/2024 >90  >60 mL/min/1.73m*2 Final    Calcium 03/14/2024 8.6  8.5 - 10.4 mg/dL Final    Phosphorus 03/14/2024 3.4  2.5 - 4.5 mg/dL Final    Albumin 03/14/2024 3.6  3.5 - 5.0 g/dL Final    Anion Gap 03/14/2024 10  <=19 mmol/L Final    Hemoglobin 03/14/2024 10.9 (L)  12.0 - 16.0 g/dL Final    Hematocrit 03/14/2024 33.4 (L)  36.0 - 46.0 % Final    Extra Tube 03/14/2024 Hold for add-ons.   Final    Glucose 03/15/2024 107 (H)  65 - 99 mg/dL Final    Sodium 03/15/2024 141  133 - 145 mmol/L Final    Potassium 03/15/2024 4.2  3.4 - 5.1 mmol/L Final    Chloride 03/15/2024 105  97 - 107 mmol/L Final    Bicarbonate 03/15/2024 24  24 - 31 mmol/L Final    Urea Nitrogen 03/15/2024 5 (L)  8 - 25 mg/dL Final    Creatinine 03/15/2024 0.60  0.40 - 1.60 mg/dL Final    eGFR 03/15/2024 >90  >60 mL/min/1.73m*2 Final    Calcium 03/15/2024 8.5  8.5 - 10.4 mg/dL Final    Albumin 03/15/2024 3.1 (L)  3.5 - 5.0 g/dL Final    Alkaline Phosphatase 03/15/2024 61  35 - 125 U/L Final    Total Protein 03/15/2024 5.5 (L)  5.9 - 7.9 g/dL Final    AST 03/15/2024 23  5 - 40 U/L Final    Bilirubin, Total 03/15/2024 <0.2  0.1 - 1.2 mg/dL Final    ALT 03/15/2024 21  5 - 40 U/L Final    Anion Gap 03/15/2024 12  <=19 mmol/L Final    WBC 03/15/2024 11.9 (H)  4.4 - 11.3 x10*3/uL Final    nRBC 03/15/2024 0.0  0.0 - 0.0 /100 WBCs Final    RBC 03/15/2024 3.53 (L)  4.00 - 5.20 x10*6/uL Final    Hemoglobin 03/15/2024 9.9 (L)  12.0 - 16.0 g/dL Final    Hematocrit 03/15/2024 31.2 (L)  36.0 - 46.0 % Final    MCV 03/15/2024 88  80 - 100 fL Final    MCH 03/15/2024 28.0  26.0 -  34.0 pg Final    MCHC 03/15/2024 31.7 (L)  32.0 - 36.0 g/dL Final    RDW 03/15/2024 11.8  11.5 - 14.5 % Final    Platelets 03/15/2024 397  150 - 450 x10*3/uL Final    Neutrophils % 03/15/2024 79.0  40.0 - 80.0 % Final    Immature Granulocytes %, Automated 03/15/2024 0.3  0.0 - 0.9 % Final    Lymphocytes % 03/15/2024 15.4  13.0 - 44.0 % Final    Monocytes % 03/15/2024 5.1  2.0 - 10.0 % Final    Eosinophils % 03/15/2024 0.0  0.0 - 6.0 % Final    Basophils % 03/15/2024 0.2  0.0 - 2.0 % Final    Neutrophils Absolute 03/15/2024 9.42 (H)  1.20 - 7.70 x10*3/uL Final    Immature Granulocytes Absolute, Au* 03/15/2024 0.04  0.00 - 0.70 x10*3/uL Final    Lymphocytes Absolute 03/15/2024 1.84  1.20 - 4.80 x10*3/uL Final    Monocytes Absolute 03/15/2024 0.61  0.10 - 1.00 x10*3/uL Final    Eosinophils Absolute 03/15/2024 0.00  0.00 - 0.70 x10*3/uL Final    Basophils Absolute 03/15/2024 0.02  0.00 - 0.10 x10*3/uL Final   Admission on 03/13/2024, Discharged on 03/13/2024   Component Date Value Ref Range Status    WBC 03/13/2024 11.0  4.4 - 11.3 x10*3/uL Final    nRBC 03/13/2024 0.0  0.0 - 0.0 /100 WBCs Final    RBC 03/13/2024 4.28  4.00 - 5.20 x10*6/uL Final    Hemoglobin 03/13/2024 12.3  12.0 - 16.0 g/dL Final    Hematocrit 03/13/2024 37.7  36.0 - 46.0 % Final    MCV 03/13/2024 88  80 - 100 fL Final    MCH 03/13/2024 28.7  26.0 - 34.0 pg Final    MCHC 03/13/2024 32.6  32.0 - 36.0 g/dL Final    RDW 03/13/2024 11.7  11.5 - 14.5 % Final    Platelets 03/13/2024 517 (H)  150 - 450 x10*3/uL Final    Neutrophils % 03/13/2024 62.9  40.0 - 80.0 % Final    Immature Granulocytes %, Automated 03/13/2024 0.4  0.0 - 0.9 % Final    Lymphocytes % 03/13/2024 26.3  13.0 - 44.0 % Final    Monocytes % 03/13/2024 8.2  2.0 - 10.0 % Final    Eosinophils % 03/13/2024 1.7  0.0 - 6.0 % Final    Basophils % 03/13/2024 0.5  0.0 - 2.0 % Final    Neutrophils Absolute 03/13/2024 6.95  1.20 - 7.70 x10*3/uL Final    Immature Granulocytes Absolute, Au* 03/13/2024  0.04  0.00 - 0.70 x10*3/uL Final    Lymphocytes Absolute 03/13/2024 2.90  1.20 - 4.80 x10*3/uL Final    Monocytes Absolute 03/13/2024 0.90  0.10 - 1.00 x10*3/uL Final    Eosinophils Absolute 03/13/2024 0.19  0.00 - 0.70 x10*3/uL Final    Basophils Absolute 03/13/2024 0.06  0.00 - 0.10 x10*3/uL Final    Glucose 03/13/2024 93  65 - 99 mg/dL Final    Sodium 03/13/2024 139  133 - 145 mmol/L Final    Potassium 03/13/2024 4.1  3.4 - 5.1 mmol/L Final    Chloride 03/13/2024 103  97 - 107 mmol/L Final    Bicarbonate 03/13/2024 24  24 - 31 mmol/L Final    Urea Nitrogen 03/13/2024 10  8 - 25 mg/dL Final    Creatinine 03/13/2024 0.50  0.40 - 1.60 mg/dL Final    eGFR 03/13/2024 >90  >60 mL/min/1.73m*2 Final    Calcium 03/13/2024 8.9  8.5 - 10.4 mg/dL Final    Albumin 03/13/2024 3.7  3.5 - 5.0 g/dL Final    Alkaline Phosphatase 03/13/2024 81  35 - 125 U/L Final    Total Protein 03/13/2024 7.2  5.9 - 7.9 g/dL Final    AST 03/13/2024 12  5 - 40 U/L Final    Bilirubin, Total 03/13/2024 <0.2  0.1 - 1.2 mg/dL Final    ALT 03/13/2024 14  5 - 40 U/L Final    Anion Gap 03/13/2024 12  <=19 mmol/L Final    Color, Urine 03/13/2024 Light-Yellow  Light-Yellow, Yellow, Dark-Yellow Final    Appearance, Urine 03/13/2024 Clear  Clear Final    Specific Gravity, Urine 03/13/2024 1.025  1.005 - 1.035 Final    pH, Urine 03/13/2024 6.0  5.0, 5.5, 6.0, 6.5, 7.0, 7.5, 8.0 Final    Protein, Urine 03/13/2024 30 (1+) (A)  NEGATIVE, 10 (TRACE), 20 (TRACE) mg/dL Final    Glucose, Urine 03/13/2024 Normal  Normal mg/dL Final    Blood, Urine 03/13/2024 NEGATIVE  NEGATIVE Final    Ketones, Urine 03/13/2024 NEGATIVE  NEGATIVE mg/dL Final    Bilirubin, Urine 03/13/2024 NEGATIVE  NEGATIVE Final    Urobilinogen, Urine 03/13/2024 Normal  Normal mg/dL Final    Nitrite, Urine 03/13/2024 NEGATIVE  NEGATIVE Final    Leukocyte Esterase, Urine 03/13/2024 NEGATIVE  NEGATIVE Final    WBC, Urine 03/13/2024 1-5  1-5, NONE /HPF Final    RBC, Urine 03/13/2024 3-5  NONE, 1-2, 3-5  /HPF Final    Squamous Epithelial Cells, Urine 03/13/2024 1-9 (SPARSE)  Reference range not established. /HPF Final    Bacteria, Urine 03/13/2024 1+ (A)  NONE SEEN /HPF Final    Mucus, Urine 03/13/2024 2+  Reference range not established. /LPF Final   Office Visit on 03/01/2024   Component Date Value Ref Range Status    Rheumatoid Factor 03/01/2024 <10  0 - 15 IU/mL Final    Sedimentation Rate 03/01/2024 61 (H)  0 - 20 mm/h Final    C-Reactive Protein 03/01/2024 5.10 (H)  0.00 - 2.00 mg/dL Final    ADELINE 03/01/2024 Negative  Negative Final       No concerns with the bloodwork    Assessment/Plan   Diagnoses and all orders for this visit:  Other atopic dermatitis  -     Referral to Allergy  Acute idiopathic urticaria  -     Referral to Allergy      Fortunately,  the history did not identify any specific red flags of concern and no additional testing is needed at this time. Also there were no allergic triggers identified today associated with the allergic reaction.  No anaphylaxis occurred and so we did not need to prescribe an epinephrine auto-injector.   We did discuss that common triggers for hives include pain medications such as NSAID's and narcotics, alcohol, (but these are not clinically signficant) and some physical triggers like heat, pressure.       I would continue taking the non-sedating antihistamine like levocetirizine and prn hydroxyzine.  I would be happy to see this patient for follow-up as needed if the condition changes or worsens.    In the future, would consider testing for the venoms and possibly environmental due to the post nasal drainage (but you would have to stop antihistamines so we should wait for now).       Jj Gonzalez MD

## 2024-09-27 ENCOUNTER — OFFICE VISIT (OUTPATIENT)
Dept: PRIMARY CARE | Facility: CLINIC | Age: 39
End: 2024-09-27
Payer: COMMERCIAL

## 2024-09-27 VITALS
HEART RATE: 85 BPM | DIASTOLIC BLOOD PRESSURE: 84 MMHG | RESPIRATION RATE: 17 BRPM | WEIGHT: 171 LBS | OXYGEN SATURATION: 98 % | BODY MASS INDEX: 29.35 KG/M2 | SYSTOLIC BLOOD PRESSURE: 122 MMHG

## 2024-09-27 DIAGNOSIS — R09.A2 GLOBUS SENSATION: ICD-10-CM

## 2024-09-27 DIAGNOSIS — K21.9 LARYNGOPHARYNGEAL REFLUX: Primary | ICD-10-CM

## 2024-09-27 DIAGNOSIS — K13.70 ORAL LESION: ICD-10-CM

## 2024-09-27 DIAGNOSIS — R20.0 NUMBNESS IN BOTH LEGS: ICD-10-CM

## 2024-09-27 PROCEDURE — 3074F SYST BP LT 130 MM HG: CPT | Performed by: NURSE PRACTITIONER

## 2024-09-27 PROCEDURE — 3079F DIAST BP 80-89 MM HG: CPT | Performed by: NURSE PRACTITIONER

## 2024-09-27 PROCEDURE — 99214 OFFICE O/P EST MOD 30 MIN: CPT | Performed by: NURSE PRACTITIONER

## 2024-09-27 ASSESSMENT — ENCOUNTER SYMPTOMS
DEPRESSION: 0
OCCASIONAL FEELINGS OF UNSTEADINESS: 0
LOSS OF SENSATION IN FEET: 0

## 2024-09-27 ASSESSMENT — PAIN SCALES - GENERAL: PAINLEVEL: 0-NO PAIN

## 2024-09-27 NOTE — PROGRESS NOTES
Subjective   Patient ID: Dot Breen is a 38 y.o. female who presents for Follow-up (Patient here for follow from previous visit. Sx's are worsen 4/30/2024).    HPI issues with swallowing and choking on salvia and water and small food and coughing back up was treated for reflux, and ENT and gastric bypass and has swallow and negative result,  Dr Josep Marino,   Concern about muscle cramping and feet issues with numbness in fingers and toes, fall asleep easity, upset and tearful, few months word confusion and not speaking what not what she is thinking no current weight loss issues   Bump inside of lower lip need pay out of pocket   Seen also by rheumatology and no acute findings  Review of Systems    Objective   /84 (BP Location: Right arm, Patient Position: Sitting, BP Cuff Size: Adult)   Pulse 85   Resp 17   Wt 77.6 kg (171 lb)   SpO2 98%   BMI 29.35 kg/m²     Physical Exam  Constitutional:       General: She is not in acute distress.     Appearance: Normal appearance.   HENT:      Right Ear: Tympanic membrane normal.      Left Ear: Tympanic membrane normal.      Nose: Nose normal.      Mouth/Throat:      Mouth: Mucous membranes are moist.      Pharynx: Posterior oropharyngeal erythema (lesion on lower left lip 2 mm) present.   Cardiovascular:      Rate and Rhythm: Normal rate and regular rhythm.      Heart sounds: No murmur heard.  Pulmonary:      Breath sounds: Normal breath sounds. No wheezing.   Abdominal:      Palpations: Abdomen is soft.   Neurological:      Mental Status: She is alert.         Assessment/Plan   Problem List Items Addressed This Visit             ICD-10-CM    Globus sensation R09.A2    Relevant Orders    CT angio head and neck w and wo IV contrast    Laryngopharyngeal reflux - Primary K21.9    Relevant Orders    CT angio head and neck w and wo IV contrast     Other Visit Diagnoses         Codes    Oral lesion     K13.70    Relevant Orders    CT angio head and neck w and wo IV  contrast    Numbness in both legs     R20.0    Relevant Orders    Referral to Neurology        Discussed issues as possible pyschoreactive with chronic allergic rhinnitis, and reflux issues continue allergy meds, nasal spray, PPI,   Reassurance given needs to continue with ENT evaluation for ongoing swallowing issues

## 2024-09-30 ENCOUNTER — PATIENT MESSAGE (OUTPATIENT)
Dept: PRIMARY CARE | Facility: CLINIC | Age: 39
End: 2024-09-30
Payer: COMMERCIAL

## 2024-09-30 DIAGNOSIS — K13.70 ORAL LESION: ICD-10-CM

## 2024-09-30 DIAGNOSIS — R09.A2 GLOBUS SENSATION: Primary | ICD-10-CM

## 2024-10-02 ENCOUNTER — HOSPITAL ENCOUNTER (EMERGENCY)
Facility: HOSPITAL | Age: 39
Discharge: HOME | End: 2024-10-02
Payer: COMMERCIAL

## 2024-10-02 ENCOUNTER — APPOINTMENT (OUTPATIENT)
Dept: NEUROLOGY | Facility: CLINIC | Age: 39
End: 2024-10-02
Payer: COMMERCIAL

## 2024-10-02 ENCOUNTER — APPOINTMENT (OUTPATIENT)
Dept: RADIOLOGY | Facility: HOSPITAL | Age: 39
End: 2024-10-02
Payer: COMMERCIAL

## 2024-10-02 VITALS
OXYGEN SATURATION: 100 % | TEMPERATURE: 97.9 F | DIASTOLIC BLOOD PRESSURE: 82 MMHG | BODY MASS INDEX: 29.02 KG/M2 | HEIGHT: 64 IN | WEIGHT: 170 LBS | HEART RATE: 66 BPM | SYSTOLIC BLOOD PRESSURE: 135 MMHG | RESPIRATION RATE: 16 BRPM

## 2024-10-02 DIAGNOSIS — R10.84 GENERALIZED ABDOMINAL PAIN: Primary | ICD-10-CM

## 2024-10-02 LAB
ALBUMIN SERPL BCP-MCNC: 4.5 G/DL (ref 3.4–5)
ALP SERPL-CCNC: 46 U/L (ref 33–110)
ALT SERPL W P-5'-P-CCNC: 11 U/L (ref 7–45)
ANION GAP SERPL CALCULATED.3IONS-SCNC: 10 MMOL/L (ref 10–20)
APPEARANCE UR: ABNORMAL
AST SERPL W P-5'-P-CCNC: 12 U/L (ref 9–39)
BASOPHILS # BLD AUTO: 0.05 X10*3/UL (ref 0–0.1)
BASOPHILS NFR BLD AUTO: 0.5 %
BILIRUB SERPL-MCNC: 0.3 MG/DL (ref 0–1.2)
BILIRUB UR STRIP.AUTO-MCNC: NEGATIVE MG/DL
BUN SERPL-MCNC: 11 MG/DL (ref 6–23)
CALCIUM SERPL-MCNC: 9.2 MG/DL (ref 8.6–10.3)
CHLORIDE SERPL-SCNC: 105 MMOL/L (ref 98–107)
CO2 SERPL-SCNC: 27 MMOL/L (ref 21–32)
COLOR UR: ABNORMAL
CREAT SERPL-MCNC: 0.63 MG/DL (ref 0.5–1.05)
EGFRCR SERPLBLD CKD-EPI 2021: >90 ML/MIN/1.73M*2
EOSINOPHIL # BLD AUTO: 0.2 X10*3/UL (ref 0–0.7)
EOSINOPHIL NFR BLD AUTO: 2.1 %
ERYTHROCYTE [DISTWIDTH] IN BLOOD BY AUTOMATED COUNT: 11.9 % (ref 11.5–14.5)
GLUCOSE SERPL-MCNC: 90 MG/DL (ref 74–99)
GLUCOSE UR STRIP.AUTO-MCNC: NORMAL MG/DL
HCG UR QL IA.RAPID: NEGATIVE
HCT VFR BLD AUTO: 39.9 % (ref 36–46)
HGB BLD-MCNC: 13.5 G/DL (ref 12–16)
IMM GRANULOCYTES # BLD AUTO: 0.01 X10*3/UL (ref 0–0.7)
IMM GRANULOCYTES NFR BLD AUTO: 0.1 % (ref 0–0.9)
KETONES UR STRIP.AUTO-MCNC: NEGATIVE MG/DL
LEUKOCYTE ESTERASE UR QL STRIP.AUTO: NEGATIVE
LIPASE SERPL-CCNC: 48 U/L (ref 9–82)
LYMPHOCYTES # BLD AUTO: 3.45 X10*3/UL (ref 1.2–4.8)
LYMPHOCYTES NFR BLD AUTO: 36 %
MCH RBC QN AUTO: 30.4 PG (ref 26–34)
MCHC RBC AUTO-ENTMCNC: 33.8 G/DL (ref 32–36)
MCV RBC AUTO: 90 FL (ref 80–100)
MONOCYTES # BLD AUTO: 0.69 X10*3/UL (ref 0.1–1)
MONOCYTES NFR BLD AUTO: 7.2 %
NEUTROPHILS # BLD AUTO: 5.18 X10*3/UL (ref 1.2–7.7)
NEUTROPHILS NFR BLD AUTO: 54.1 %
NITRITE UR QL STRIP.AUTO: NEGATIVE
NRBC BLD-RTO: 0 /100 WBCS (ref 0–0)
PH UR STRIP.AUTO: 6 [PH]
PLATELET # BLD AUTO: 330 X10*3/UL (ref 150–450)
POTASSIUM SERPL-SCNC: 4 MMOL/L (ref 3.5–5.3)
PROT SERPL-MCNC: 7.2 G/DL (ref 6.4–8.2)
PROT UR STRIP.AUTO-MCNC: NEGATIVE MG/DL
RBC # BLD AUTO: 4.44 X10*6/UL (ref 4–5.2)
RBC # UR STRIP.AUTO: NEGATIVE /UL
SODIUM SERPL-SCNC: 138 MMOL/L (ref 136–145)
SP GR UR STRIP.AUTO: 1.01
UROBILINOGEN UR STRIP.AUTO-MCNC: NORMAL MG/DL
WBC # BLD AUTO: 9.6 X10*3/UL (ref 4.4–11.3)

## 2024-10-02 PROCEDURE — 81025 URINE PREGNANCY TEST: CPT | Performed by: NURSE PRACTITIONER

## 2024-10-02 PROCEDURE — 36415 COLL VENOUS BLD VENIPUNCTURE: CPT | Performed by: NURSE PRACTITIONER

## 2024-10-02 PROCEDURE — 96375 TX/PRO/DX INJ NEW DRUG ADDON: CPT

## 2024-10-02 PROCEDURE — 2500000004 HC RX 250 GENERAL PHARMACY W/ HCPCS (ALT 636 FOR OP/ED): Performed by: NURSE PRACTITIONER

## 2024-10-02 PROCEDURE — 99284 EMERGENCY DEPT VISIT MOD MDM: CPT

## 2024-10-02 PROCEDURE — 85025 COMPLETE CBC W/AUTO DIFF WBC: CPT | Performed by: NURSE PRACTITIONER

## 2024-10-02 PROCEDURE — 81003 URINALYSIS AUTO W/O SCOPE: CPT | Performed by: NURSE PRACTITIONER

## 2024-10-02 PROCEDURE — 84075 ASSAY ALKALINE PHOSPHATASE: CPT | Performed by: NURSE PRACTITIONER

## 2024-10-02 PROCEDURE — 83690 ASSAY OF LIPASE: CPT | Performed by: NURSE PRACTITIONER

## 2024-10-02 PROCEDURE — 96374 THER/PROPH/DIAG INJ IV PUSH: CPT

## 2024-10-02 PROCEDURE — 74177 CT ABD & PELVIS W/CONTRAST: CPT

## 2024-10-02 PROCEDURE — 2550000001 HC RX 255 CONTRASTS: Performed by: NURSE PRACTITIONER

## 2024-10-02 RX ORDER — FAMOTIDINE 10 MG/ML
20 INJECTION INTRAVENOUS ONCE
Status: COMPLETED | OUTPATIENT
Start: 2024-10-02 | End: 2024-10-02

## 2024-10-02 RX ORDER — ONDANSETRON HYDROCHLORIDE 2 MG/ML
4 INJECTION, SOLUTION INTRAVENOUS ONCE
Status: COMPLETED | OUTPATIENT
Start: 2024-10-02 | End: 2024-10-02

## 2024-10-02 RX ORDER — KETOROLAC TROMETHAMINE 30 MG/ML
15 INJECTION, SOLUTION INTRAMUSCULAR; INTRAVENOUS ONCE
Status: COMPLETED | OUTPATIENT
Start: 2024-10-02 | End: 2024-10-02

## 2024-10-02 ASSESSMENT — LIFESTYLE VARIABLES
HAVE YOU EVER FELT YOU SHOULD CUT DOWN ON YOUR DRINKING: NO
HAVE PEOPLE ANNOYED YOU BY CRITICIZING YOUR DRINKING: NO
EVER FELT BAD OR GUILTY ABOUT YOUR DRINKING: NO
TOTAL SCORE: 0
EVER HAD A DRINK FIRST THING IN THE MORNING TO STEADY YOUR NERVES TO GET RID OF A HANGOVER: NO

## 2024-10-02 ASSESSMENT — PAIN - FUNCTIONAL ASSESSMENT: PAIN_FUNCTIONAL_ASSESSMENT: 0-10

## 2024-10-02 ASSESSMENT — PAIN DESCRIPTION - PAIN TYPE: TYPE: ACUTE PAIN

## 2024-10-02 ASSESSMENT — PAIN SCALES - GENERAL
PAINLEVEL_OUTOF10: 7
PAINLEVEL_OUTOF10: 0 - NO PAIN

## 2024-10-02 ASSESSMENT — PAIN DESCRIPTION - LOCATION: LOCATION: ABDOMEN

## 2024-10-02 NOTE — PROGRESS NOTES
Subjective   Patient ID: Dot Breen is a 38 y.o. female who presents for No chief complaint on file..  HPI  PAIN IN LEFT SIDE   START WT:  203  IDEAL WT: 140 START EXCESS:63 HT: 64 IN  Review of Systems  Bariatric Surgery F/U:          Seen at ER after surgery? No.  Hospital admission? No.  Bariatric reoperation? No.  Bariatric intervention? No.  Was anticoagulation initiated for presumed/confirmed Vein Thrombosis/PE? No.  Was an incisional hernia noted on exam? No.  Sleep Apnea? No.  Still using C-PAP? No.  GERD req. meds? YES.  Hyperlipidemia? No.  Still taking Cholesterol med? .  Hypertension? YES.  Still taking HTN med? YES Number of Anti-hypertensive Medications: 2.  Diabetes? No.  Still taking DM med? No.  Current DM medication type: _____.         CONSTITUTIONAL:          Chills No.  Fatigue No.  Fever No.         CARDIOLOGY:          Negative for dizziness, chest pain, palpitations, shortness of breath.         RESPIRATORY:          Negative for chest congestion, cough, wheezing.         GASTROENTEROLOGY:          Food Intolerance No.  Acid reflux YES.  Abdominal pain YES.  Black stools No.  Constipation YES.  Diarrhea No. ADMITS TO CHANGE IN BOWELS Loss of appetite no.  Nausea No.  Vomiting No.         UROLOGY:          Negative for dysuria, urinary urgency, kidney stones.         PSYCHOLOGY:          Negative for depression, anxiety, high stress level.   Objective   Physical Exam    Assessment/Plan   {Assess/PlanSmartLinks:03178}         Radha Quiroga LPN 10/02/24 4:01 PM

## 2024-10-03 ENCOUNTER — APPOINTMENT (OUTPATIENT)
Dept: SURGERY | Facility: CLINIC | Age: 39
End: 2024-10-03
Payer: COMMERCIAL

## 2024-10-03 NOTE — ED PROVIDER NOTES
HPI   Chief Complaint   Patient presents with    Abdominal Pain     Patient states for the past week left sided abdominal pain, feels like her abdomen is on fire.  Denies any nausea or vomiting.       HPI  See my MDM      Patient History   Past Medical History:   Diagnosis Date    HTN (hypertension)     Other specified diabetes mellitus without complications (Multi)     Diabetes mellitus of other type without complication    Personal history of other diseases of the circulatory system     History of hypertension    Personal history of other mental and behavioral disorders 07/18/2017    History of anxiety disorder    Unspecified asthma, uncomplicated ([facility]) 01/04/2017    Asthmatic bronchitis     Past Surgical History:   Procedure Laterality Date    CHOLECYSTECTOMY  2023    HYSTERECTOMY  2023    OTHER SURGICAL HISTORY  06/09/2021    Gastric bypass surgery    OTHER SURGICAL HISTORY  03/2024    OTHER SURGICAL HISTORY      LAPAROSCOPIC LYSIS OF ADHESIONS/ ESOPHAGOGASTRODUODENOSCOPY  ([facility])     Family History   Problem Relation Name Age of Onset    Heart disease Mother  59    Other (CA of lung) Father      No Known Problems Brother      No Known Problems Daughter      No Known Problems Son       Social History     Tobacco Use    Smoking status: Former     Current packs/day: 0.00     Types: Cigarettes     Quit date: 2014     Years since quitting: 10.7     Passive exposure: Never    Smokeless tobacco: Never   Vaping Use    Vaping status: Never Used   Substance Use Topics    Alcohol use: Never    Drug use: Never       Physical Exam   ED Triage Vitals [10/02/24 2018]   Temperature Heart Rate Respirations BP   36.6 °C (97.9 °F) 66 16 135/82      Pulse Ox Temp Source Heart Rate Source Patient Position   100 % Temporal Monitor Sitting      BP Location FiO2 (%)     Left arm --       Physical Exam  CONSTITUTIONAL: Vital signs reviewed as charted, well-developed and in no distress  Eyes: Extraocular muscles are intact.  Pupils equal round and reactive to light. Conjunctiva are pink.    ENT: Mucous membranes are moist. Tongue in the midline. Pharynx was without erythema or exudates, uvula midline  LUNGS: Breath sounds equal and clear to auscultation. Good air exchange, no wheezes rales or retractions, pulse oximetry is charted.  HEART: Regular rate and rhythm without murmur thrill or rub, strong tones, auscultation is normal.  ABDOMEN: Tenderness in the left side of the abdomen no rebound or guarding noted.  Abdomen soft.  Neuro: The patient is awake, alert and oriented ×3. Moving all 4 extremities and answering questions appropriately.   MUSCULOSKELETAL: The calves are nontender to palpation. Full gross active range of motion.   PSYCH: Awake alert oriented, normal mood and affect.  Skin:  Dry, normal color, warm to the touch, no rash present.        ED Course & MDM   Diagnoses as of 10/02/24 2206   Generalized abdominal pain                 No data recorded     Vandana Coma Scale Score: 15 (10/02/24 2019 : Annika Cleaning RN)                           Medical Decision Making  History obtained from: patient    Vital signs, nursing notes, current medications, past medical history, Surgical history, allergies, social history, family History were reviewed.         HPI:  Patient is a 38-year-old female presenting emergency room today complaining of left-sided abdominal pain and having bowel movement 9 days and a burning sensation in the left side of her abdomen.  She has had a history of a perforated ulcer in the past.  She states she called her GI doctor today and they could not see her for couple weeks.      10 point ROS was reviewed and negative except Noted above in HPI.  DDX: as listed above          MDM Summary/considerations:  EMERGENCY DEPARTMENT COURSE and DIFFERENTIAL DIAGNOSIS/MDM:    The patient presented with a chief complaint of left-sided abdominal pain, constipation. The differential diagnosis associated with this  "patient's presentation includes ulcer, constipation, gastritis.     Vitals:    Vitals:    10/02/24 2018   BP: 135/82   BP Location: Left arm   Patient Position: Sitting   Pulse: 66   Resp: 16   Temp: 36.6 °C (97.9 °F)   TempSrc: Temporal   SpO2: 100%   Weight: 77.1 kg (170 lb)   Height: 1.626 m (5' 4\")       Diagnoses as of 10/02/24 2206   Generalized abdominal pain       History Limited by:    None    Independent history obtained from:    None    External records reviewed:    None    Diagnostics interpreted by me:    CT Scan(s) abdomen and pelvis    Discussions with other clinicians:    None    Chronic conditions impacting care:    None    Social determinants of health affecting care:    None    Diagnostic tests considered but not performed: none    ED Medications managed:    Medications   ketorolac (Toradol) injection 15 mg (15 mg intravenous Given 10/2/24 2041)   famotidine PF (Pepcid) injection 20 mg (20 mg intravenous Given 10/2/24 2040)   ondansetron (Zofran) injection 4 mg (4 mg intravenous Given 10/2/24 2041)   iohexol (OMNIPaque) 350 mg iodine/mL solution 75 mL (75 mL intravenous Given 10/2/24 2123)       Prescription drugs considered:    None    Screenings:          Labs Reviewed   URINALYSIS WITH REFLEX MICROSCOPIC - Abnormal       Result Value    Color, Urine Light-Yellow      Appearance, Urine Turbid (*)     Specific Gravity, Urine 1.015      pH, Urine 6.0      Protein, Urine NEGATIVE      Glucose, Urine Normal      Blood, Urine NEGATIVE      Ketones, Urine NEGATIVE      Bilirubin, Urine NEGATIVE      Urobilinogen, Urine Normal      Nitrite, Urine NEGATIVE      Leukocyte Esterase, Urine NEGATIVE     COMPREHENSIVE METABOLIC PANEL - Normal    Glucose 90      Sodium 138      Potassium 4.0      Chloride 105      Bicarbonate 27      Anion Gap 10      Urea Nitrogen 11      Creatinine 0.63      eGFR >90      Calcium 9.2      Albumin 4.5      Alkaline Phosphatase 46      Total Protein 7.2      AST 12      " Bilirubin, Total 0.3      ALT 11     LIPASE - Normal    Lipase 48      Narrative:     Venipuncture immediately after or during the administration of Metamizole may lead to falsely low results. Testing should be performed immediately prior to Metamizole dosing.   HCG, URINE, QUALITATIVE - Normal    HCG, Urine NEGATIVE     CBC WITH AUTO DIFFERENTIAL    WBC 9.6      nRBC 0.0      RBC 4.44      Hemoglobin 13.5      Hematocrit 39.9      MCV 90      MCH 30.4      MCHC 33.8      RDW 11.9      Platelets 330      Neutrophils % 54.1      Immature Granulocytes %, Automated 0.1      Lymphocytes % 36.0      Monocytes % 7.2      Eosinophils % 2.1      Basophils % 0.5      Neutrophils Absolute 5.18      Immature Granulocytes Absolute, Automated 0.01      Lymphocytes Absolute 3.45      Monocytes Absolute 0.69      Eosinophils Absolute 0.20      Basophils Absolute 0.05       CT abdomen pelvis w IV contrast   Final Result   1.  No evidence of acute abdominal pathology.             MACRO:   None        Signed by: Klever Aguilera 10/2/2024 9:49 PM   Dictation workstation:   [id number]        Medications   ketorolac (Toradol) injection 15 mg (15 mg intravenous Given 10/2/24 2041)   famotidine PF (Pepcid) injection 20 mg (20 mg intravenous Given 10/2/24 2040)   ondansetron (Zofran) injection 4 mg (4 mg intravenous Given 10/2/24 2041)   iohexol (OMNIPaque) 350 mg iodine/mL solution 75 mL (75 mL intravenous Given 10/2/24 2123)     New Prescriptions    No medications on file     I estimate there is a low risk for acute appendicitis, bowel extraction, cystitis, diverticulitis, incarcerated hernia, mesenteric ischemia, pancreatitis, or perforated bowel or ulcer, thus I considered the discharge disposition reasonable. There is no evidence of peritonitis, sepsis, or toxicity. I have discussed the diagnosis and risks, and we agreed with discharging home to follow-up with the primary care doctor. We also discussed returning to the emergency  department immediately if new or worsening symptoms occur. Symptoms of most concern that we discussed are bloody stool, fever, changing or worsening pain, or vomiting that necessitates immediate return.    Laboratory assessment showed no acute pathology, normal urinalysis.  CT scan of the abdomen pelvis showed no acute pathology.  Patient was discharged home stable condition states she has a bottle of mag citrate at home that she will start on tomorrow Doppler constipation.  She was discharged home stable condition will follow with her PCP for reevaluation will follow with her GI doctor as scheduled.    All of the patient's questions were answered to the best of my ability.  Patient states understanding that they have been screened for an emergency today and we have not found any etiology of symptoms that requires emergent treatment or admission to the hospital at this point. They understand that they have not had definitive care day and require follow-up for treatment of their condition. They also state understanding that they may have an emergent condition that may potentially have not of detected at this visit and they must return to the emergency department if they develop any worsening of symptoms or new complaints.      I have evaluated this patient, my supervising physician was available for consultation.            Critical Care:Not warranted at this time        This chart was completed using voice recognition transcription software. Please excuse any errors of transcription including grammatical, punctuation, syntax and spelling errors.  Please contact me with any questions regarding this chart.    Procedure  Procedures     MARLON Lund-CNP  10/02/24 [id number]

## 2024-10-14 ENCOUNTER — LAB (OUTPATIENT)
Dept: LAB | Facility: LAB | Age: 39
End: 2024-10-14
Payer: COMMERCIAL

## 2024-10-14 DIAGNOSIS — K13.70 ORAL LESION: ICD-10-CM

## 2024-10-14 DIAGNOSIS — R09.A2 GLOBUS SENSATION: ICD-10-CM

## 2024-10-14 LAB
ALBUMIN SERPL BCP-MCNC: 4.4 G/DL (ref 3.4–5)
ALP SERPL-CCNC: 41 U/L (ref 33–110)
ALT SERPL W P-5'-P-CCNC: 11 U/L (ref 7–45)
ANION GAP SERPL CALCULATED.3IONS-SCNC: 7 MMOL/L (ref 10–20)
AST SERPL W P-5'-P-CCNC: 14 U/L (ref 9–39)
BILIRUB SERPL-MCNC: 0.3 MG/DL (ref 0–1.2)
BUN SERPL-MCNC: 10 MG/DL (ref 6–23)
CALCIUM SERPL-MCNC: 9.5 MG/DL (ref 8.6–10.3)
CHLORIDE SERPL-SCNC: 108 MMOL/L (ref 98–107)
CO2 SERPL-SCNC: 32 MMOL/L (ref 21–32)
CREAT SERPL-MCNC: 0.65 MG/DL (ref 0.5–1.05)
EGFRCR SERPLBLD CKD-EPI 2021: >90 ML/MIN/1.73M*2
GLUCOSE SERPL-MCNC: 85 MG/DL (ref 74–99)
POTASSIUM SERPL-SCNC: 4.3 MMOL/L (ref 3.5–5.3)
PROT SERPL-MCNC: 6.5 G/DL (ref 6.4–8.2)
SODIUM SERPL-SCNC: 143 MMOL/L (ref 136–145)

## 2024-10-14 PROCEDURE — 36415 COLL VENOUS BLD VENIPUNCTURE: CPT

## 2024-10-14 PROCEDURE — 80053 COMPREHEN METABOLIC PANEL: CPT

## 2024-10-15 ENCOUNTER — HOSPITAL ENCOUNTER (OUTPATIENT)
Dept: RADIOLOGY | Facility: HOSPITAL | Age: 39
Discharge: HOME | End: 2024-10-15
Payer: COMMERCIAL

## 2024-10-15 DIAGNOSIS — R09.A2 GLOBUS SENSATION: ICD-10-CM

## 2024-10-15 DIAGNOSIS — K21.9 LARYNGOPHARYNGEAL REFLUX: ICD-10-CM

## 2024-10-15 DIAGNOSIS — K13.70 ORAL LESION: ICD-10-CM

## 2024-10-15 DIAGNOSIS — R09.A2 GLOBUS SENSATION: Primary | ICD-10-CM

## 2024-10-15 PROCEDURE — 2550000001 HC RX 255 CONTRASTS: Performed by: NURSE PRACTITIONER

## 2024-10-15 PROCEDURE — 70491 CT SOFT TISSUE NECK W/DYE: CPT | Performed by: RADIOLOGY

## 2024-10-15 PROCEDURE — 70491 CT SOFT TISSUE NECK W/DYE: CPT

## 2024-11-05 ENCOUNTER — OFFICE VISIT (OUTPATIENT)
Dept: OTOLARYNGOLOGY | Facility: CLINIC | Age: 39
End: 2024-11-05
Payer: COMMERCIAL

## 2024-11-05 VITALS — HEIGHT: 64 IN | TEMPERATURE: 96.9 F | WEIGHT: 170 LBS | BODY MASS INDEX: 29.02 KG/M2

## 2024-11-05 DIAGNOSIS — R13.10 DYSPHAGIA, UNSPECIFIED TYPE: ICD-10-CM

## 2024-11-05 DIAGNOSIS — K21.9 LARYNGOPHARYNGEAL REFLUX: Primary | ICD-10-CM

## 2024-11-05 DIAGNOSIS — R09.89 THROAT CLEARING: ICD-10-CM

## 2024-11-05 PROCEDURE — 99213 OFFICE O/P EST LOW 20 MIN: CPT | Performed by: OTOLARYNGOLOGY

## 2024-11-05 PROCEDURE — 3008F BODY MASS INDEX DOCD: CPT | Performed by: OTOLARYNGOLOGY

## 2024-11-05 PROCEDURE — 31575 DIAGNOSTIC LARYNGOSCOPY: CPT | Performed by: OTOLARYNGOLOGY

## 2024-11-05 PROCEDURE — 1036F TOBACCO NON-USER: CPT | Performed by: OTOLARYNGOLOGY

## 2024-11-05 RX ORDER — MAG/ALUMINUM/SOD BICARB/ALGINC 14.2-80MG
2 TABLET,CHEWABLE ORAL 3 TIMES DAILY
Qty: 180 TABLET | Refills: 11 | Status: SHIPPED | OUTPATIENT
Start: 2024-11-05 | End: 2025-11-05

## 2024-11-05 NOTE — PROGRESS NOTES
HPI  Dot Breen is a 38 y.o. female with dysphagia, throat clearing, drainage.  She has a history of reflux and is on omeprazole 40 mg daily.  She has been on it twice daily and her symptoms in the throat persist.  She had a CT scan of her neck which demonstrated some small thyroid nodules and was kindly referred.  She is not having trouble breathing or throat pain or hemoptysis      Past Medical History:   Diagnosis Date    HTN (hypertension)     Other specified diabetes mellitus without complications     Diabetes mellitus of other type without complication    Personal history of other diseases of the circulatory system     History of hypertension    Personal history of other mental and behavioral disorders 07/18/2017    History of anxiety disorder    Unspecified asthma, uncomplicated ([facility]) 01/04/2017    Asthmatic bronchitis            Medications:     Current Outpatient Medications:     acetaminophen (Tylenol) 500 mg tablet, Take 2 tablets (1,000 mg) by mouth every 6 hours if needed for mild pain (1 - 3)., Disp: , Rfl:     amLODIPine (Norvasc) 5 mg tablet, Take 1 tablet (5 mg) by mouth once daily., Disp: 90 tablet, Rfl: 3    bisoprolol (Zebeta) 5 mg tablet, Take 1 tablet (5 mg) by mouth once daily. Takes 1/2 daily, Disp: 90 tablet, Rfl: 3    ergocalciferol (DrisdoL) 1.25 MG (03738 UT) capsule, Take 1 capsule (1,250 mcg) by mouth 1 (one) time per week. Do not fill before July 28, 2024., Disp: 12 capsule, Rfl: 3    hydrOXYzine pamoate (Vistaril) 25 mg capsule, TAKE 1 CAPSULE(25 MG) BY MOUTH THREE TIMES DAILY FOR UP TO 10 DAYS AS NEEDED FOR ITCHING, Disp: 30 capsule, Rfl: 3    levocetirizine (Xyzal) 5 mg tablet, Take 1 tablet (5 mg) by mouth once daily at bedtime., Disp: , Rfl:     multivit-minerals/folic acid (WOMEN'S MULTIVITAMIN GUMMIES ORAL), Take by mouth., Disp: , Rfl:     omeprazole (PriLOSEC) 40 mg DR capsule, TAKE 1 CAPSULE(40 MG) BY MOUTH DAILY, Disp: 90 capsule, Rfl: 3    polyethylene glycol  "(Glycolax, Miralax) 17 gram packet, Take 17 g by mouth once daily., Disp: , Rfl:     triamcinolone (Kenalog) 0.1 % cream, Apply topically. (Patient not taking: Reported on 11/5/2024), Disp: , Rfl:      Allergies:  Allergies   Allergen Reactions    Vilazodone Rash    Trazodone Rash    Xifaxan [Rifaximin] Rash        Physical Exam:  Last Recorded Vitals  Temperature 36.1 °C (96.9 °F), height 1.626 m (5' 4\"), weight 77.1 kg (170 lb).  General:     General appearance: Well-developed, well-nourished in no acute distress.       Voice:  normal       Head/face: Normal appearance; nontender to palpation     Facial nerve function: Normal and symmetric bilaterally.    Oral/oropharynx:     Oral vestibule: Normal labial and gingival mucosa     Tongue/floor of mouth: Normal without lesion     Oropharynx: Clear.  No lesions present of the hard/soft palate, posterior pharynx    Neck:     Neck: Normal appearance, trachea midline     Salivary glands: Normal to palpation bilaterally     Lymph nodes: No cervical lymphadenopathy to palpation     Thyroid: No thyromegaly.  No palpable nodules     Range of motion: Normal    Neurological:     Cortical functions: Alert and oriented x3, appropriate affect       Larynx/hypopharynx:     Laryngeal findings: Mirror exam inadequate or limited secondary to enlarged base of tongue and/or excessive gagging.  Flexible laryngoscopy performed secondary to inadequate mirror examination.  Verbal informed consent the flexible laryngoscope was passed through the nasal cavity.  Normal epiglottis, aryepiglottic folds, arytenoids, false vocal cords, true vocal cords.  Normal vocal cord mobility bilaterally was identified.  No mucosal masses or lesions.    Nasopharynx:     Nasopharynx: Normal    Ear:     Ear canal: Normal bilaterally     Tympanic membrane: Intact and mobile bilaterally     Pinna: Normal bilaterally     Hearing:  Gross hearing assessment normal by voice    Nose:     Visualized using: Anterior " rhinoscopy     Nasopharynx: Inadequate mirror exam secondary to gag, anatomy.       Nasal dorsum: Nontraumatic midline appearance     Septum: Midline     Inferior turbinates: Normally sized     Mucosa: Bilateral, pink, normal appearing       ASSESSMENT/PLAN:  Her physical examination is without suspicious mass or lesion.  I do think reflux is probably playing some role although she did quite a bit of throat clearing in the office and there may be a neurogenic component too.  Recommend the addition of Gaviscon to her Prilosec as twice daily Prilosec did not seem to help.  I do not think that the findings on the CT scan are likely to be responsible for her throat symptoms.  She has had esophagram and EGD without finding.  I will see her back in about 6 weeks, sooner as needed        Jj Chacon MD

## 2024-11-12 ENCOUNTER — APPOINTMENT (OUTPATIENT)
Dept: OTOLARYNGOLOGY | Facility: CLINIC | Age: 39
End: 2024-11-12
Payer: COMMERCIAL

## 2024-11-15 ENCOUNTER — HOSPITAL ENCOUNTER (EMERGENCY)
Facility: HOSPITAL | Age: 39
Discharge: HOME | End: 2024-11-15
Payer: COMMERCIAL

## 2024-11-15 VITALS
OXYGEN SATURATION: 100 % | DIASTOLIC BLOOD PRESSURE: 78 MMHG | HEIGHT: 64 IN | WEIGHT: 170 LBS | SYSTOLIC BLOOD PRESSURE: 161 MMHG | TEMPERATURE: 98.1 F | BODY MASS INDEX: 29.02 KG/M2 | HEART RATE: 61 BPM | RESPIRATION RATE: 18 BRPM

## 2024-11-15 DIAGNOSIS — M54.42 CHRONIC BILATERAL LOW BACK PAIN WITH BILATERAL SCIATICA: Primary | ICD-10-CM

## 2024-11-15 DIAGNOSIS — M54.41 CHRONIC BILATERAL LOW BACK PAIN WITH BILATERAL SCIATICA: Primary | ICD-10-CM

## 2024-11-15 DIAGNOSIS — G89.29 CHRONIC BILATERAL LOW BACK PAIN WITH BILATERAL SCIATICA: Primary | ICD-10-CM

## 2024-11-15 PROCEDURE — 99283 EMERGENCY DEPT VISIT LOW MDM: CPT

## 2024-11-15 PROCEDURE — 96372 THER/PROPH/DIAG INJ SC/IM: CPT

## 2024-11-15 PROCEDURE — 2500000001 HC RX 250 WO HCPCS SELF ADMINISTERED DRUGS (ALT 637 FOR MEDICARE OP)

## 2024-11-15 PROCEDURE — 2500000004 HC RX 250 GENERAL PHARMACY W/ HCPCS (ALT 636 FOR OP/ED)

## 2024-11-15 RX ORDER — OXYCODONE AND ACETAMINOPHEN 5; 325 MG/1; MG/1
1 TABLET ORAL EVERY 6 HOURS PRN
Qty: 12 TABLET | Refills: 0 | Status: SHIPPED | OUTPATIENT
Start: 2024-11-15 | End: 2024-11-18

## 2024-11-15 RX ORDER — ORPHENADRINE CITRATE 30 MG/ML
60 INJECTION INTRAMUSCULAR; INTRAVENOUS ONCE
Status: COMPLETED | OUTPATIENT
Start: 2024-11-15 | End: 2024-11-15

## 2024-11-15 RX ORDER — OXYCODONE AND ACETAMINOPHEN 5; 325 MG/1; MG/1
2 TABLET ORAL ONCE
Status: COMPLETED | OUTPATIENT
Start: 2024-11-15 | End: 2024-11-15

## 2024-11-15 RX ORDER — CYCLOBENZAPRINE HCL 10 MG
10 TABLET ORAL 2 TIMES DAILY PRN
Qty: 20 TABLET | Refills: 0 | Status: SHIPPED | OUTPATIENT
Start: 2024-11-15 | End: 2024-11-25

## 2024-11-15 ASSESSMENT — PAIN SCALES - GENERAL: PAINLEVEL_OUTOF10: 8

## 2024-11-15 ASSESSMENT — PAIN DESCRIPTION - PAIN TYPE: TYPE: ACUTE PAIN

## 2024-11-15 ASSESSMENT — PAIN DESCRIPTION - LOCATION: LOCATION: BACK

## 2024-11-15 ASSESSMENT — PAIN DESCRIPTION - ORIENTATION: ORIENTATION: LOWER

## 2024-11-15 ASSESSMENT — PAIN - FUNCTIONAL ASSESSMENT: PAIN_FUNCTIONAL_ASSESSMENT: 0-10

## 2024-11-15 NOTE — Clinical Note
Dot Breen was seen and treated in our emergency department on 11/15/2024.  She may return to work on 11/20/2024.       If you have any questions or concerns, please don't hesitate to call.      Jama Torres PA-C

## 2024-11-15 NOTE — ED TRIAGE NOTES
"Pt to ED for lower back pain that started yesterday. Pt has had 2 episodes where she felt as though she \"blacked out\" due to the pain yesterday. Pt with dizziness with the pain today. No medications taken for the pain. Pt denies any urinary symptoms or constipation/diarrhea.  "

## 2024-11-16 NOTE — ED PROVIDER NOTES
"HPI   Chief Complaint   Patient presents with    Back Pain     Pt to ED for lower back pain that started yesterday. Pt has had 2 episodes where she felt as though she \"blacked out\" due to the pain yesterday. Pt with dizziness with the pain today. No medications taken for the pain. Pt denies any urinary symptoms or constipation/diarrhea.       HPI  Patient is a 38-year-old female who presents to ED for lower back pain that started yesterday.  Patient denies any injury or trauma.  She states she may have pulled a muscle.  She states that she felt like she was about to pass out due to the pain.  Denies taking any medications for the pain.  Denies any other acute complaints.  Denies any back pain red flags.      Patient History   Past Medical History:   Diagnosis Date    HTN (hypertension)     Other specified diabetes mellitus without complications     Diabetes mellitus of other type without complication    Personal history of other diseases of the circulatory system     History of hypertension    Personal history of other mental and behavioral disorders 07/18/2017    History of anxiety disorder    Unspecified asthma, uncomplicated ([facility]) 01/04/2017    Asthmatic bronchitis     Past Surgical History:   Procedure Laterality Date    CHOLECYSTECTOMY  2023    HYSTERECTOMY  2023    OTHER SURGICAL HISTORY  06/09/2021    Gastric bypass surgery    OTHER SURGICAL HISTORY  03/2024    OTHER SURGICAL HISTORY      LAPAROSCOPIC LYSIS OF ADHESIONS/ ESOPHAGOGASTRODUODENOSCOPY  ([facility])     Family History   Problem Relation Name Age of Onset    Heart disease Mother  59    Other (CA of lung) Father      No Known Problems Brother      No Known Problems Daughter      No Known Problems Son       Social History     Tobacco Use    Smoking status: Former     Current packs/day: 0.00     Types: Cigarettes     Quit date: 2014     Years since quitting: 10.8     Passive exposure: Never    Smokeless tobacco: Never   Vaping Use    Vaping status: " Never Used   Substance Use Topics    Alcohol use: Never    Drug use: Never       Physical Exam   ED Triage Vitals [11/15/24 1203]   Temperature Heart Rate Respirations BP   36.7 °C (98.1 °F) 61 18 161/78      Pulse Ox Temp Source Heart Rate Source Patient Position   100 % Oral -- Sitting      BP Location FiO2 (%)     Right arm --       Physical Exam  Vitals reviewed.   Constitutional:       General: She is not in acute distress.     Appearance: Normal appearance. She is not ill-appearing.   HENT:      Head: Normocephalic and atraumatic.   Eyes:      Extraocular Movements: Extraocular movements intact.   Cardiovascular:      Rate and Rhythm: Normal rate and regular rhythm.      Heart sounds: Normal heart sounds.   Pulmonary:      Effort: Pulmonary effort is normal.      Breath sounds: Normal breath sounds.   Abdominal:      General: Abdomen is flat.      Palpations: Abdomen is soft.      Tenderness: There is no abdominal tenderness.   Musculoskeletal:         General: Normal range of motion.      Cervical back: Normal range of motion and neck supple.   Skin:     General: Skin is warm and dry.   Neurological:      General: No focal deficit present.      Mental Status: She is alert and oriented to person, place, and time.      Sensory: No sensory deficit.      Motor: No weakness.      Coordination: Coordination normal.      Gait: Gait normal.   Psychiatric:         Mood and Affect: Mood normal.         Behavior: Behavior normal.           ED Course & MDM   Diagnoses as of 11/15/24 2211   Chronic bilateral low back pain with bilateral sciatica                 No data recorded     Vandana Coma Scale Score: 15 (11/15/24 1204 : Emily Goodwin RN)                           Medical Decision Making  Parts of this chart have been completed using voice recognition software. Please excuse any errors of transcription.  My thought process and reason for plan has been formulated from the time that I saw the patient until the time  "of disposition and is not specific to one specific moment during their visit and furthermore my MDM encompasses this entire chart and not only this text box.    HPI:   A medically appropriate HPI was obtained, outlined above.    Dot Breen is a  38 y.o. female    Chief Complaint   Patient presents with    Back Pain     Pt to ED for lower back pain that started yesterday. Pt has had 2 episodes where she felt as though she \"blacked out\" due to the pain yesterday. Pt with dizziness with the pain today. No medications taken for the pain. Pt denies any urinary symptoms or constipation/diarrhea.       Past Medical History:   Diagnosis Date    HTN (hypertension)     Other specified diabetes mellitus without complications     Diabetes mellitus of other type without complication    Personal history of other diseases of the circulatory system     History of hypertension    Personal history of other mental and behavioral disorders 07/18/2017    History of anxiety disorder    Unspecified asthma, uncomplicated ([facility]) 01/04/2017    Asthmatic bronchitis       Past Surgical History:   Procedure Laterality Date    CHOLECYSTECTOMY  2023    HYSTERECTOMY  2023    OTHER SURGICAL HISTORY  06/09/2021    Gastric bypass surgery    OTHER SURGICAL HISTORY  03/2024    OTHER SURGICAL HISTORY      LAPAROSCOPIC LYSIS OF ADHESIONS/ ESOPHAGOGASTRODUODENOSCOPY  ([facility])       Social History     Tobacco Use    Smoking status: Former     Current packs/day: 0.00     Types: Cigarettes     Quit date: 2014     Years since quitting: 10.8     Passive exposure: Never    Smokeless tobacco: Never   Vaping Use    Vaping status: Never Used   Substance Use Topics    Alcohol use: Never    Drug use: Never       Family History   Problem Relation Name Age of Onset    Heart disease Mother  59    Other (CA of lung) Father      No Known Problems Brother      No Known Problems Daughter      No Known Problems Son         Allergies   Allergen Reactions    " "Vilazodone Rash    Trazodone Rash    Xifaxan [Rifaximin] Rash       Current Outpatient Medications   Medication Instructions    acetaminophen (TYLENOL) 1,000 mg, Every 6 hours PRN    Al hyd-Mg tr-alg ac-sod bicarb (Gaviscon) 80-14.2 mg tablet,chewable 2 tablets, oral, 3 times daily    amLODIPine (NORVASC) 5 mg, oral, Daily    bisoprolol (ZEBETA) 5 mg, oral, Daily, Takes 1/2 daily    cyclobenzaprine (FLEXERIL) 10 mg, oral, 2 times daily PRN    ergocalciferol (DRISDOL) 1,250 mcg, oral, Once Weekly    hydrOXYzine pamoate (Vistaril) 25 mg capsule TAKE 1 CAPSULE(25 MG) BY MOUTH THREE TIMES DAILY FOR UP TO 10 DAYS AS NEEDED FOR ITCHING    levocetirizine (XYZAL) 5 mg, Nightly    multivit-minerals/folic acid (WOMEN'S MULTIVITAMIN GUMMIES ORAL) Take by mouth.    omeprazole (PRILOSEC) 40 mg, oral, Daily    oxyCODONE-acetaminophen (Percocet) 5-325 mg tablet 1 tablet, oral, Every 6 hours PRN    polyethylene glycol (GLYCOLAX, MIRALAX) 17 g, Daily    triamcinolone (Kenalog) 0.1 % cream Apply topically.       History obtained from:   Patient    Exam:   Patient Vitals for the past 24 hrs:   BP Temp Temp src Pulse Resp SpO2 Height Weight   11/15/24 1203 161/78 36.7 °C (98.1 °F) Oral 61 18 100 % 1.626 m (5' 4\") 77.1 kg (170 lb)       A medically appropriate exam performed, outlined above, given the known history and presentation.    EKG/Cardiac monitor:     Social Determinants of Health considered during this visit:     Medications given during visit:  Medications   orphenadrine (Norflex) injection 60 mg (60 mg intramuscular Given 11/15/24 1224)   oxyCODONE-acetaminophen (Percocet) 5-325 mg per tablet 2 tablet (2 tablets oral Given 11/15/24 1224)        Diagnostic/tests:  Labs Reviewed - No data to display     No orders to display        Differential:  As I have deemed necessary from their history, physical, and studies, I have considered the following diagnoses:     SPINAL CORD COMPRESSION  SPINAL EPIDURAL ABSCESS  METASTATIC " CANCER  VERTEBRAL COMPRESSION FRACTURE  RADICULOPATHY  SPINAL STENOSIS  OSTEOARTHRITIS  SCOLIOSIS  PIRIFORMIS SYNDROME  LUMBAR STRAIN  SCIATICA    No emergent MRI indicated  I completed a structured, evidence-based clinical evaluation to screen for acute non-traumatic spinal emergencies. The patient has a normal detailed neurologic exam and a low red flag score. The evidence indicates that the patient is very low risk for an acute spinal emergency and this is consistent with my clinical intuition. The risk of further workup is higher than the likelihood of the patient having a spinal epidural abscess or other dangerous emergency spinal condition. It is, therefore, in the patient´s best interest not to do additional emergent testing at this time. I have discussed with the patient my clinical impression and the result of an evidence-based clinical evaluation to screen for spinal epidural abscess and other spinal emergencies, as well as the risk of further testing and hospitalization. The evidence shows that the risk for an acute spinal emergency is less than 1%. Although the risk of an acute spinal emergency has not been completely eliminated, the risks of further testing likely exceed any potential benefit, and the patient agrees with not pursuing further emergent evaluation for causes of back pain at this time.    BACK PAIN RED FLAGS    Minor (1 Point Each)  Alcohol Abuse  Diabetes Mellitus  Renal Failure  Night Pain  3rd Visit in last 20 days    Major (3 Points Each)  IV Drug Use  Fever without focus  Recent/Current Systemic Infection  Immunosuppression  Recent Spinal Fracture/Procedure  Incontinence or Retention  Indwelling Urinary Catheter    Critical Care:  Not indicated    MDM Summary:  Patient feels much better after medications.  She is safe for discharge.    We have discussed the diagnosis and risks, and we agree with discharging home to follow-up with appropriate physician as directed. We also discussed  returning to the Emergency Department immediately if new or worsening symptoms occur. We have discussed the symptoms which are most concerning that necessitate immediate return. Pt symptoms have been well controlled here and the patient is safe for discharge with appropriate outpatient follow up. The patient has verbalized understanding to return to ER without delay for new or worsening pains or for any other symptoms or concerns. I utilized the discharge clinical management tool provided [employer] to help estimate risk of negative outcome for this patient.      Disposition:  ED Prescriptions       Medication Sig Dispense Start Date End Date Auth. Provider    cyclobenzaprine (Flexeril) 10 mg tablet Take 1 tablet (10 mg) by mouth 2 times a day as needed for muscle spasms for up to 10 days. 20 tablet 11/15/2024 11/25/2024 Jama Torres PA-C    oxyCODONE-acetaminophen (Percocet) 5-325 mg tablet Take 1 tablet by mouth every 6 hours if needed for severe pain (7 - 10) for up to 3 days. 12 tablet 11/15/2024 11/18/2024 Jama Torres PA-C              Procedure  Procedures     Jama Torres PA-C  11/15/24 3115

## 2025-01-02 ENCOUNTER — APPOINTMENT (OUTPATIENT)
Dept: NEUROLOGY | Facility: HOSPITAL | Age: 40
End: 2025-01-02
Payer: COMMERCIAL

## 2025-01-09 ENCOUNTER — OFFICE VISIT (OUTPATIENT)
Dept: PRIMARY CARE | Facility: CLINIC | Age: 40
End: 2025-01-09
Payer: COMMERCIAL

## 2025-01-09 ENCOUNTER — HOSPITAL ENCOUNTER (OUTPATIENT)
Dept: RADIOLOGY | Facility: CLINIC | Age: 40
Discharge: HOME | End: 2025-01-09
Payer: COMMERCIAL

## 2025-01-09 VITALS — OXYGEN SATURATION: 98 % | DIASTOLIC BLOOD PRESSURE: 64 MMHG | SYSTOLIC BLOOD PRESSURE: 112 MMHG | HEART RATE: 80 BPM

## 2025-01-09 DIAGNOSIS — E53.8 FOLATE DEFICIENCY: ICD-10-CM

## 2025-01-09 DIAGNOSIS — M54.30 SCIATIC NERVE PAIN, UNSPECIFIED LATERALITY: ICD-10-CM

## 2025-01-09 DIAGNOSIS — M79.605 PAIN IN BOTH LOWER EXTREMITIES: ICD-10-CM

## 2025-01-09 DIAGNOSIS — M79.10 MYALGIA: Primary | ICD-10-CM

## 2025-01-09 DIAGNOSIS — R25.2 MUSCLE CRAMP: ICD-10-CM

## 2025-01-09 DIAGNOSIS — E55.9 VITAMIN D DEFICIENCY: ICD-10-CM

## 2025-01-09 DIAGNOSIS — M25.50 ARTHRALGIA, UNSPECIFIED JOINT: ICD-10-CM

## 2025-01-09 DIAGNOSIS — T14.8XXA BRUISING: ICD-10-CM

## 2025-01-09 DIAGNOSIS — M79.604 PAIN IN BOTH LOWER EXTREMITIES: ICD-10-CM

## 2025-01-09 LAB
CRP SERPL-MCNC: <0.1 MG/DL
ERYTHROCYTE [SEDIMENTATION RATE] IN BLOOD BY WESTERGREN METHOD: 5 MM/H (ref 0–20)
IRON SATN MFR SERPL: 16 % (ref 25–45)
IRON SERPL-MCNC: 70 UG/DL (ref 35–150)
TIBC SERPL-MCNC: 427 UG/DL (ref 240–445)
UIBC SERPL-MCNC: 357 UG/DL (ref 110–370)

## 2025-01-09 PROCEDURE — 3078F DIAST BP <80 MM HG: CPT | Performed by: NURSE PRACTITIONER

## 2025-01-09 PROCEDURE — 82306 VITAMIN D 25 HYDROXY: CPT | Performed by: NURSE PRACTITIONER

## 2025-01-09 PROCEDURE — 85652 RBC SED RATE AUTOMATED: CPT | Performed by: NURSE PRACTITIONER

## 2025-01-09 PROCEDURE — 3074F SYST BP LT 130 MM HG: CPT | Performed by: NURSE PRACTITIONER

## 2025-01-09 PROCEDURE — 99213 OFFICE O/P EST LOW 20 MIN: CPT | Performed by: NURSE PRACTITIONER

## 2025-01-09 PROCEDURE — 72110 X-RAY EXAM L-2 SPINE 4/>VWS: CPT

## 2025-01-09 PROCEDURE — 86140 C-REACTIVE PROTEIN: CPT | Performed by: NURSE PRACTITIONER

## 2025-01-09 PROCEDURE — 83540 ASSAY OF IRON: CPT | Performed by: NURSE PRACTITIONER

## 2025-01-09 PROCEDURE — 82607 VITAMIN B-12: CPT | Performed by: NURSE PRACTITIONER

## 2025-01-09 RX ORDER — MAGNESIUM CARB/ALUMINUM HYDROX 105-160MG
TABLET,CHEWABLE ORAL
COMMUNITY
Start: 2024-11-05

## 2025-01-09 RX ORDER — GABAPENTIN 300 MG/1
300 CAPSULE ORAL 2 TIMES DAILY
Qty: 60 CAPSULE | Refills: 1 | Status: SHIPPED | OUTPATIENT
Start: 2025-01-09 | End: 2025-07-08

## 2025-01-09 ASSESSMENT — ENCOUNTER SYMPTOMS
DEPRESSION: 0
OCCASIONAL FEELINGS OF UNSTEADINESS: 0
LOSS OF SENSATION IN FEET: 0

## 2025-01-09 ASSESSMENT — PAIN SCALES - GENERAL: PAINLEVEL_OUTOF10: 4

## 2025-01-09 NOTE — PROGRESS NOTES
Subjective   Patient ID: Dot Breen is a 39 y.o. female who presents for Follow-up (Patient here for leg pain. We gave her referral to Neurology but it's booked too far).    HPI was seen previous rhematology for inflammation and lab previously drawn, was told she did not need to see them has iron defincy due to her gastric by pass 2022, taking VitD weekly   No swelling in legs, gets cramping in legs and feet and shin and leg pain, has not tried compression.   Has sciatic nerve pain, hip and low back pain was hospitalized one month ago flare with weather changes, working second job at [employer] difficulty walking 8 hours.     Review of Systems   Musculoskeletal:  Positive for back pain and myalgias.       Objective   There were no vitals taken for this visit.    Physical Exam  Constitutional:       General: She is not in acute distress.     Appearance: Normal appearance.   Cardiovascular:      Rate and Rhythm: Normal rate and regular rhythm.      Heart sounds: No murmur heard.  Pulmonary:      Effort: Pulmonary effort is normal.      Breath sounds: Normal breath sounds. No wheezing.   Musculoskeletal:      Cervical back: No edema, erythema or tenderness. Normal range of motion.      Thoracic back: No spasms, tenderness or bony tenderness. Normal range of motion.      Lumbar back: No spasms, tenderness or bony tenderness. Normal range of motion. Negative right straight leg raise test and negative left straight leg raise test.      Comments:      Skin:     General: Skin is warm and dry.   Neurological:      Mental Status: She is alert.      Cranial Nerves: Cranial nerves 2-12 are intact.      Sensory: Sensation is intact.      Motor: No weakness, tremor, atrophy or abnormal muscle tone.      Gait: Gait is intact. Gait normal.      Deep Tendon Reflexes:      Reflex Scores:       Brachioradialis reflexes are 2+ on the right side and 2+ on the left side.       Patellar reflexes are 2+ on the right side and 2+ on the  left side.       Achilles reflexes are 2+ on the right side and 2+ on the left side.        Assessment/Plan

## 2025-01-10 LAB
25(OH)D3 SERPL-MCNC: 73 NG/ML (ref 30–100)
VIT B12 SERPL-MCNC: 348 PG/ML (ref 211–911)

## 2025-01-12 ASSESSMENT — ENCOUNTER SYMPTOMS
MYALGIAS: 1
BACK PAIN: 1

## 2025-01-14 ENCOUNTER — TELEPHONE (OUTPATIENT)
Dept: PRIMARY CARE | Facility: CLINIC | Age: 40
End: 2025-01-14
Payer: COMMERCIAL

## 2025-01-14 DIAGNOSIS — M41.86 OTHER FORM OF SCOLIOSIS OF LUMBAR SPINE: ICD-10-CM

## 2025-01-14 NOTE — TELEPHONE ENCOUNTER
----- Message from Bethany Frias sent at 1/12/2025  2:39 PM EST -----  Mild scolosis and degerative changes L4-S1 recommend physical therapy

## 2025-01-16 ENCOUNTER — OFFICE VISIT (OUTPATIENT)
Dept: PAIN MEDICINE | Facility: CLINIC | Age: 40
End: 2025-01-16
Payer: COMMERCIAL

## 2025-01-16 VITALS — SYSTOLIC BLOOD PRESSURE: 110 MMHG | OXYGEN SATURATION: 98 % | HEART RATE: 67 BPM | DIASTOLIC BLOOD PRESSURE: 66 MMHG

## 2025-01-16 DIAGNOSIS — M54.16 LUMBAR RADICULOPATHY: Primary | ICD-10-CM

## 2025-01-16 DIAGNOSIS — M79.10 MYALGIA: ICD-10-CM

## 2025-01-16 DIAGNOSIS — M54.30 SCIATIC NERVE PAIN, UNSPECIFIED LATERALITY: ICD-10-CM

## 2025-01-16 DIAGNOSIS — M79.605 PAIN IN BOTH LOWER EXTREMITIES: ICD-10-CM

## 2025-01-16 DIAGNOSIS — M25.50 ARTHRALGIA, UNSPECIFIED JOINT: ICD-10-CM

## 2025-01-16 DIAGNOSIS — M79.604 PAIN IN BOTH LOWER EXTREMITIES: ICD-10-CM

## 2025-01-16 PROCEDURE — G2211 COMPLEX E/M VISIT ADD ON: HCPCS | Performed by: ANESTHESIOLOGY

## 2025-01-16 PROCEDURE — 3074F SYST BP LT 130 MM HG: CPT | Performed by: ANESTHESIOLOGY

## 2025-01-16 PROCEDURE — 3078F DIAST BP <80 MM HG: CPT | Performed by: ANESTHESIOLOGY

## 2025-01-16 PROCEDURE — 99204 OFFICE O/P NEW MOD 45 MIN: CPT | Performed by: ANESTHESIOLOGY

## 2025-01-16 PROCEDURE — 99214 OFFICE O/P EST MOD 30 MIN: CPT | Performed by: ANESTHESIOLOGY

## 2025-01-16 RX ORDER — TIZANIDINE 2 MG/1
2 TABLET ORAL 3 TIMES DAILY PRN
Qty: 90 TABLET | Refills: 0 | Status: SHIPPED | OUTPATIENT
Start: 2025-01-16 | End: 2025-02-15

## 2025-01-16 RX ORDER — PREGABALIN 50 MG/1
CAPSULE ORAL
Qty: 60 CAPSULE | Refills: 0 | Status: SHIPPED | OUTPATIENT
Start: 2025-01-16

## 2025-01-16 ASSESSMENT — ENCOUNTER SYMPTOMS
NUMBNESS: 1
CONSTITUTIONAL NEGATIVE: 1
LOSS OF SENSATION IN FEET: 0
HEMATOLOGIC/LYMPHATIC NEGATIVE: 1
DEPRESSION: 0
BACK PAIN: 1
RESPIRATORY NEGATIVE: 1
PSYCHIATRIC NEGATIVE: 1
OCCASIONAL FEELINGS OF UNSTEADINESS: 0
ENDOCRINE NEGATIVE: 1
CARDIOVASCULAR NEGATIVE: 1
EYES NEGATIVE: 1
GASTROINTESTINAL NEGATIVE: 1
WEAKNESS: 1

## 2025-01-16 ASSESSMENT — PAIN - FUNCTIONAL ASSESSMENT: PAIN_FUNCTIONAL_ASSESSMENT: 0-10

## 2025-01-16 ASSESSMENT — PAIN SCALES - GENERAL
PAINLEVEL_OUTOF10: 5
PAINLEVEL_OUTOF10: 5 - MODERATE PAIN

## 2025-01-16 ASSESSMENT — PAIN DESCRIPTION - DESCRIPTORS: DESCRIPTORS: ACHING;CRAMPING

## 2025-01-16 ASSESSMENT — PATIENT HEALTH QUESTIONNAIRE - PHQ9
2. FEELING DOWN, DEPRESSED OR HOPELESS: NOT AT ALL
1. LITTLE INTEREST OR PLEASURE IN DOING THINGS: NOT AT ALL
SUM OF ALL RESPONSES TO PHQ9 QUESTIONS 1 AND 2: 0

## 2025-01-16 ASSESSMENT — LIFESTYLE VARIABLES: TOTAL SCORE: 3

## 2025-01-16 NOTE — LETTER
January 17, 2025     LANDRY Rubio  [address]  [address]  [facility] [address]    Patient: Dot Breen   YOB: 1985   Date of Visit: 1/16/2025       Dear LANDRY Rubio:    Thank you for referring Dot Breen to me for evaluation. Below are my notes for this consultation.  If you have questions, please do not hesitate to call me. I look forward to following your patient along with you.       Sincerely,     José Luis Fierro MD      CC: No Recipients  ______________________________________________________________________________________    PAIN MANAGEMENT NEW PATIENT OFFICE NOTE    Date of Service: 1/16/2025    SUBJECTIVE    CHIEF COMPLAINT: LBP    HISTORY OF PRESENT ILLNESS    Dot Breen is a 39 y.o. female with PMH HTN, BPD, ADHD, PTSD, IBS, GERD/PUD, gastric bypass 2021, former smoker who presents as new patient referred by LANDRY Rubio with LB pain.    Pt describes LBP pain since 5 y, but has worsened in last 6 o without inciting trauma/incident. Pain radiates across to hips and laterally down BLE to lateral ankles assoc with numbness, weakness. Pain is worse with prolonged sitting, standing, walking, lifting, which leaves her restless. Pain is alleviated with warm bath. Pt has tried Tylenol, Icy-hot patches, gabapentin, rest, back brace without sustained relief. S/f PT. No NSAIDs, oral steroids 2/2 bypass    Pt denies new-onset bowel/bladder incontinence.  Pt denies recent infection, allergy to Latex/iodine/contrast. Patient is currently taking the following blood thinner(s): N/A    REVIEW OF SYSTEMS  Review of Systems   Constitutional: Negative.    HENT: Negative.     Eyes: Negative.    Respiratory: Negative.     Cardiovascular: Negative.    Gastrointestinal: Negative.    Endocrine: Negative.    Musculoskeletal:  Positive for back pain.   Skin: Negative.    Neurological:  Positive for weakness and numbness.   Hematological: Negative.     Psychiatric/Behavioral: Negative.         PAST MEDICAL HISTORY  Past Medical History:   Diagnosis Date   • HTN (hypertension)    • Other specified diabetes mellitus without complications     Diabetes mellitus of other type without complication   • Personal history of other diseases of the circulatory system     History of hypertension   • Personal history of other mental and behavioral disorders 07/18/2017    History of anxiety disorder   • Unspecified asthma, uncomplicated ([facility]) 01/04/2017    Asthmatic bronchitis     Past Surgical History:   Procedure Laterality Date   • CHOLECYSTECTOMY  2023   • HYSTERECTOMY  2023   • OTHER SURGICAL HISTORY  06/09/2021    Gastric bypass surgery   • OTHER SURGICAL HISTORY  03/2024   • OTHER SURGICAL HISTORY      LAPAROSCOPIC LYSIS OF ADHESIONS/ ESOPHAGOGASTRODUODENOSCOPY  ([facility])     Family History   Problem Relation Name Age of Onset   • Heart disease Mother  59   • Other (CA of lung) Father     • No Known Problems Brother     • No Known Problems Daughter     • No Known Problems Son         CURRENT MEDICATIONS  Current Outpatient Medications   Medication Sig Dispense Refill   • acetaminophen (Tylenol) 500 mg tablet Take 2 tablets (1,000 mg) by mouth every 6 hours if needed for mild pain (1 - 3).     • Al hyd-Mg tr-alg ac-sod bicarb (Gaviscon) 80-14.2 mg tablet,chewable Chew 2 tablets 3 times a day. 180 tablet 11   • amLODIPine (Norvasc) 5 mg tablet Take 1 tablet (5 mg) by mouth once daily. 90 tablet 3   • bisoprolol (Zebeta) 5 mg tablet Take 1 tablet (5 mg) by mouth once daily. Takes 1/2 daily 90 tablet 3   • ergocalciferol (DrisdoL) 1.25 MG (86374 UT) capsule Take 1 capsule (1,250 mcg) by mouth 1 (one) time per week. Do not fill before July 28, 2024. 12 capsule 3   • gabapentin (Neurontin) 300 mg capsule Take 1 capsule (300 mg) by mouth 2 times a day. 60 capsule 1   • Gaviscon Extra Strength 160-105 mg tablet,chewable CHEW 1 TABLET BY MOUTH THREE TIMES DAILY     •  hydrOXYzine pamoate (Vistaril) 25 mg capsule TAKE 1 CAPSULE(25 MG) BY MOUTH THREE TIMES DAILY FOR UP TO 10 DAYS AS NEEDED FOR ITCHING 30 capsule 3   • levocetirizine (Xyzal) 5 mg tablet Take 1 tablet (5 mg) by mouth once daily at bedtime.     • multivit-minerals/folic acid (WOMEN'S MULTIVITAMIN GUMMIES ORAL) Take by mouth.     • omeprazole (PriLOSEC) 40 mg DR capsule TAKE 1 CAPSULE(40 MG) BY MOUTH DAILY 90 capsule 3   • polyethylene glycol (Glycolax, Miralax) 17 gram packet Take 17 g by mouth once daily.     • triamcinolone (Kenalog) 0.1 % cream Apply topically.     • cyclobenzaprine (Flexeril) 10 mg tablet Take 1 tablet (10 mg) by mouth 2 times a day as needed for muscle spasms for up to 10 days. 20 tablet 0     No current facility-administered medications for this visit.       ALLERGIES AND DRUG REACTIONS  Allergies   Allergen Reactions   • Vilazodone Rash   • Trazodone Rash   • Xifaxan [Rifaximin] Rash          OBJECTIVE  Visit Vitals  /66   Pulse 67   SpO2 98%   OB Status Hysterectomy   Smoking Status Former       Last Recorded Pain Score (if available):           Pain Score:   5     Physical Exam  Vitals and nursing note reviewed.     General: Sitting in chair, NAD  Head: NCAT  Eyes: Sclera/conjunctiva clear, EOMI, PERRL  Nose/mouth: MMM  CV: Good distal pulses  Lungs: Good/equal chest excursion  Abdomen: Soft, ND  Ext: No cyanosis/edema  MSK: L-spine alignment: unremarkable, BL paraspinal m NTTP, L-spine ROM: extension/flexion lmtd by pain    Neuro: AAOx3   Dermatome sensation to light touch  LEFT L1 (lower pelvis/upper thigh): WNL    RIGHT L1: WNL      LEFT L2 (upper thigh): WNL       RIGHT: L2:WNL      LEFT L3 (medial knee): WNL       RIGHT L3: WNL      LEFT L4 (superior medial malleolus): WNL       RIGHT L4: WNL      LEFT L5 (dorsal foot): WNL       RIGHT L5: WNL      LEFT S1 (lateral foot): WNL     RIGHT S1: WNL      LEFT S2 (popliteal fossa): WNL    RIGHT S2: WNL        Motor strength  LEFT L2 (hip  flexion): 5/5   RIGHT L2: 5/5  LEFT L3 (knee extension): 5/5     RIGHT L3: 5/5  LEFT L4 (dorsiflexion): 5/5     RIGHT L4: 5/5  LEFT L5 (EHL extension): 5/5     RIGHT L5: 5/5  LEFT S1 (plantarflexion): 5/5     RIGHT S1: 5/5  LEFT S2 (knee flexion): 5/5      RIGHT S2: 5/5    Special testing  DTR unremarkable  Seated slump test +BLE  Clonus: neg BL  Babinski: neg BL    Psych: affect nl  Skin: no rash/lesions      REVIEW OF LABORATORY DATA  I have reviewed the following lab results:  WBC   Date Value Ref Range Status   10/02/2024 9.6 4.4 - 11.3 x10*3/uL Final     RBC   Date Value Ref Range Status   10/02/2024 4.44 4.00 - 5.20 x10*6/uL Final     Hemoglobin   Date Value Ref Range Status   10/02/2024 13.5 12.0 - 16.0 g/dL Final     Hematocrit   Date Value Ref Range Status   10/02/2024 39.9 36.0 - 46.0 % Final     MCV   Date Value Ref Range Status   10/02/2024 90 80 - 100 fL Final     MCH   Date Value Ref Range Status   10/02/2024 30.4 26.0 - 34.0 pg Final     MCHC   Date Value Ref Range Status   10/02/2024 33.8 32.0 - 36.0 g/dL Final     RDW   Date Value Ref Range Status   10/02/2024 11.9 11.5 - 14.5 % Final     Platelets   Date Value Ref Range Status   10/02/2024 330 150 - 450 x10*3/uL Final     MPV   Date Value Ref Range Status   04/11/2023 9.6 7.0 - 12.6 CU Final     Sodium   Date Value Ref Range Status   10/14/2024 143 136 - 145 mmol/L Final     Potassium   Date Value Ref Range Status   10/14/2024 4.3 3.5 - 5.3 mmol/L Final     Bicarbonate   Date Value Ref Range Status   10/14/2024 32 21 - 32 mmol/L Final     Urea Nitrogen   Date Value Ref Range Status   10/14/2024 10 6 - 23 mg/dL Final     Calcium   Date Value Ref Range Status   10/14/2024 9.5 8.6 - 10.3 mg/dL Final     Protime   Date Value Ref Range Status   08/06/2020 9.8 9.3 - 12.7 SEC Final     INR   Date Value Ref Range Status   08/06/2020 0.9 0.86 - 1.16 Final     Comment:     LESS THAN   INR Therapeutic Range: 2.0-3.5           REVIEW OF RADIOLOGY   I have  reviewed the following:  Radiology Studies           XR L-spine 1/9/25:  Mild lumbar degenerative change with a minimal scoliosis        ASSESSMENT & PLAN  Dot Breen is a 39 y.o. female with PMH HTN, BPD, ADHD, PTSD, IBS, GERD/PUD, gastric bypass 2021, former smoker who presents as new patient referred by MARLON Rubio-CNP with LB pain    1) LBP  -Since 2020 worsened since 7/2024 radiating down BLE subj numbness/weakness w/o obj deficit, +seated slump BL  -Refractive to yrs of conservative tx including Tylenol, Icy-hot patches, gabapentin, rest, back brace. No NSAIDs, oral steroids 2/2 bypass  -Referral to PT to maximize conservative tx  -In meantime, Lyrica 50 mg BID titration, tizanidine 2 mg TID PRN  -F/U 6-8 w          Today's visit involved establishment of care and a complete examination with review of records. In the context of the complexity of this patient's chronic pain diagnosis, long-term expectations and care planning discussed. Imaging studies ordered are placed do elucidate the patient's diagnosis, but also to evaluate the patient's candidacy for procedural and surgical interventions. The risks and benefits of these potential interventions are detailed as above.         José Luis Fierro MD  Anesthesiologist & Interventional Pain Physician   [facility]  O: [phone]  F: [phone]  1:17 PM  01/16/25

## 2025-01-16 NOTE — PROGRESS NOTES
PAIN MANAGEMENT NEW PATIENT OFFICE NOTE    Date of Service: 1/16/2025    SUBJECTIVE    CHIEF COMPLAINT: LBP    HISTORY OF PRESENT ILLNESS    Dot Breen is a 39 y.o. female with PMH HTN, BPD, ADHD, PTSD, IBS, GERD/PUD, gastric bypass 2021, former smoker who presents as new patient referred by LANDRY Rubio with LB pain.    Pt describes LBP pain since 5 y, but has worsened in last 6 o without inciting trauma/incident. Pain radiates across to hips and laterally down BLE to lateral ankles assoc with numbness, weakness. Pain is worse with prolonged sitting, standing, walking, lifting, which leaves her restless. Pain is alleviated with warm bath. Pt has tried Tylenol, Icy-hot patches, gabapentin, rest, back brace without sustained relief. S/f PT. No NSAIDs, oral steroids 2/2 bypass    Pt denies new-onset bowel/bladder incontinence.  Pt denies recent infection, allergy to Latex/iodine/contrast. Patient is currently taking the following blood thinner(s): N/A    REVIEW OF SYSTEMS  Review of Systems   Constitutional: Negative.    HENT: Negative.     Eyes: Negative.    Respiratory: Negative.     Cardiovascular: Negative.    Gastrointestinal: Negative.    Endocrine: Negative.    Musculoskeletal:  Positive for back pain.   Skin: Negative.    Neurological:  Positive for weakness and numbness.   Hematological: Negative.    Psychiatric/Behavioral: Negative.         PAST MEDICAL HISTORY  Past Medical History:   Diagnosis Date    HTN (hypertension)     Other specified diabetes mellitus without complications     Diabetes mellitus of other type without complication    Personal history of other diseases of the circulatory system     History of hypertension    Personal history of other mental and behavioral disorders 07/18/2017    History of anxiety disorder    Unspecified asthma, uncomplicated ([facility]) 01/04/2017    Asthmatic bronchitis     Past Surgical History:   Procedure Laterality Date    CHOLECYSTECTOMY  2023     HYSTERECTOMY  2023    OTHER SURGICAL HISTORY  06/09/2021    Gastric bypass surgery    OTHER SURGICAL HISTORY  03/2024    OTHER SURGICAL HISTORY      LAPAROSCOPIC LYSIS OF ADHESIONS/ ESOPHAGOGASTRODUODENOSCOPY  ([facility])     Family History   Problem Relation Name Age of Onset    Heart disease Mother  59    Other (CA of lung) Father      No Known Problems Brother      No Known Problems Daughter      No Known Problems Son         CURRENT MEDICATIONS  Current Outpatient Medications   Medication Sig Dispense Refill    acetaminophen (Tylenol) 500 mg tablet Take 2 tablets (1,000 mg) by mouth every 6 hours if needed for mild pain (1 - 3).      Al hyd-Mg tr-alg ac-sod bicarb (Gaviscon) 80-14.2 mg tablet,chewable Chew 2 tablets 3 times a day. 180 tablet 11    amLODIPine (Norvasc) 5 mg tablet Take 1 tablet (5 mg) by mouth once daily. 90 tablet 3    bisoprolol (Zebeta) 5 mg tablet Take 1 tablet (5 mg) by mouth once daily. Takes 1/2 daily 90 tablet 3    ergocalciferol (DrisdoL) 1.25 MG (50805 UT) capsule Take 1 capsule (1,250 mcg) by mouth 1 (one) time per week. Do not fill before July 28, 2024. 12 capsule 3    gabapentin (Neurontin) 300 mg capsule Take 1 capsule (300 mg) by mouth 2 times a day. 60 capsule 1    Gaviscon Extra Strength 160-105 mg tablet,chewable CHEW 1 TABLET BY MOUTH THREE TIMES DAILY      hydrOXYzine pamoate (Vistaril) 25 mg capsule TAKE 1 CAPSULE(25 MG) BY MOUTH THREE TIMES DAILY FOR UP TO 10 DAYS AS NEEDED FOR ITCHING 30 capsule 3    levocetirizine (Xyzal) 5 mg tablet Take 1 tablet (5 mg) by mouth once daily at bedtime.      multivit-minerals/folic acid (WOMEN'S MULTIVITAMIN GUMMIES ORAL) Take by mouth.      omeprazole (PriLOSEC) 40 mg DR capsule TAKE 1 CAPSULE(40 MG) BY MOUTH DAILY 90 capsule 3    polyethylene glycol (Glycolax, Miralax) 17 gram packet Take 17 g by mouth once daily.      triamcinolone (Kenalog) 0.1 % cream Apply topically.      cyclobenzaprine (Flexeril) 10 mg tablet Take 1 tablet (10 mg) by  mouth 2 times a day as needed for muscle spasms for up to 10 days. 20 tablet 0     No current facility-administered medications for this visit.       ALLERGIES AND DRUG REACTIONS  Allergies   Allergen Reactions    Vilazodone Rash    Trazodone Rash    Xifaxan [Rifaximin] Rash          OBJECTIVE  Visit Vitals  /66   Pulse 67   SpO2 98%   OB Status Hysterectomy   Smoking Status Former       Last Recorded Pain Score (if available):           Pain Score:   5     Physical Exam  Vitals and nursing note reviewed.     General: Sitting in chair, NAD  Head: NCAT  Eyes: Sclera/conjunctiva clear, EOMI, PERRL  Nose/mouth: MMM  CV: Good distal pulses  Lungs: Good/equal chest excursion  Abdomen: Soft, ND  Ext: No cyanosis/edema  MSK: L-spine alignment: unremarkable, BL paraspinal m NTTP, L-spine ROM: extension/flexion lmtd by pain    Neuro: AAOx3   Dermatome sensation to light touch  LEFT L1 (lower pelvis/upper thigh): WNL    RIGHT L1: WNL      LEFT L2 (upper thigh): WNL       RIGHT: L2:WNL      LEFT L3 (medial knee): WNL       RIGHT L3: WNL      LEFT L4 (superior medial malleolus): WNL       RIGHT L4: WNL      LEFT L5 (dorsal foot): WNL       RIGHT L5: WNL      LEFT S1 (lateral foot): WNL     RIGHT S1: WNL      LEFT S2 (popliteal fossa): WNL    RIGHT S2: WNL        Motor strength  LEFT L2 (hip flexion): 5/5   RIGHT L2: 5/5  LEFT L3 (knee extension): 5/5     RIGHT L3: 5/5  LEFT L4 (dorsiflexion): 5/5     RIGHT L4: 5/5  LEFT L5 (EHL extension): 5/5     RIGHT L5: 5/5  LEFT S1 (plantarflexion): 5/5     RIGHT S1: 5/5  LEFT S2 (knee flexion): 5/5      RIGHT S2: 5/5    Special testing  DTR unremarkable  Seated slump test +BLE  Clonus: neg BL  Babinski: neg BL    Psych: affect nl  Skin: no rash/lesions      REVIEW OF LABORATORY DATA  I have reviewed the following lab results:  WBC   Date Value Ref Range Status   10/02/2024 9.6 4.4 - 11.3 x10*3/uL Final     RBC   Date Value Ref Range Status   10/02/2024 4.44 4.00 - 5.20 x10*6/uL  Final     Hemoglobin   Date Value Ref Range Status   10/02/2024 13.5 12.0 - 16.0 g/dL Final     Hematocrit   Date Value Ref Range Status   10/02/2024 39.9 36.0 - 46.0 % Final     MCV   Date Value Ref Range Status   10/02/2024 90 80 - 100 fL Final     MCH   Date Value Ref Range Status   10/02/2024 30.4 26.0 - 34.0 pg Final     MCHC   Date Value Ref Range Status   10/02/2024 33.8 32.0 - 36.0 g/dL Final     RDW   Date Value Ref Range Status   10/02/2024 11.9 11.5 - 14.5 % Final     Platelets   Date Value Ref Range Status   10/02/2024 330 150 - 450 x10*3/uL Final     MPV   Date Value Ref Range Status   04/11/2023 9.6 7.0 - 12.6 CU Final     Sodium   Date Value Ref Range Status   10/14/2024 143 136 - 145 mmol/L Final     Potassium   Date Value Ref Range Status   10/14/2024 4.3 3.5 - 5.3 mmol/L Final     Bicarbonate   Date Value Ref Range Status   10/14/2024 32 21 - 32 mmol/L Final     Urea Nitrogen   Date Value Ref Range Status   10/14/2024 10 6 - 23 mg/dL Final     Calcium   Date Value Ref Range Status   10/14/2024 9.5 8.6 - 10.3 mg/dL Final     Protime   Date Value Ref Range Status   08/06/2020 9.8 9.3 - 12.7 SEC Final     INR   Date Value Ref Range Status   08/06/2020 0.9 0.86 - 1.16 Final     Comment:     LESS THAN   INR Therapeutic Range: 2.0-3.5           REVIEW OF RADIOLOGY   I have reviewed the following:  Radiology Studies           XR L-spine 1/9/25:  Mild lumbar degenerative change with a minimal scoliosis        ASSESSMENT & PLAN  Dot Breen is a 39 y.o. female with PMH HTN, BPD, ADHD, PTSD, IBS, GERD/PUD, gastric bypass 2021, former smoker who presents as new patient referred by MARLON Rubio-CNP with LB pain    1) LBP  -Since 2020 worsened since 7/2024 radiating down BLE subj numbness/weakness w/o obj deficit, +seated slump BL  -Refractive to yrs of conservative tx including Tylenol, Icy-hot patches, gabapentin, rest, back brace. No NSAIDs, oral steroids 2/2 bypass  -Referral to PT to maximize  conservative tx  -In meantime, Lyrica 50 mg BID titration, tizanidine 2 mg TID PRN  -F/U 6-8 w          Today's visit involved establishment of care and a complete examination with review of records. In the context of the complexity of this patient's chronic pain diagnosis, long-term expectations and care planning discussed. Imaging studies ordered are placed do elucidate the patient's diagnosis, but also to evaluate the patient's candidacy for procedural and surgical interventions. The risks and benefits of these potential interventions are detailed as above.         José Luis Fierro MD  Anesthesiologist & Interventional Pain Physician   [facility]  O: [phone]  F: [phone]  1:17 PM  01/16/25

## 2025-01-17 PROBLEM — M54.30 SCIATIC NERVE PAIN: Status: ACTIVE | Noted: 2025-01-17

## 2025-01-17 PROBLEM — M79.10 MYALGIA: Status: ACTIVE | Noted: 2025-01-17

## 2025-01-17 PROBLEM — M54.16 LUMBAR RADICULOPATHY: Status: ACTIVE | Noted: 2025-01-17

## 2025-01-17 PROBLEM — M79.604 PAIN IN BOTH LOWER EXTREMITIES: Status: ACTIVE | Noted: 2025-01-17

## 2025-01-17 PROBLEM — M79.605 PAIN IN BOTH LOWER EXTREMITIES: Status: ACTIVE | Noted: 2025-01-17

## 2025-01-21 ENCOUNTER — APPOINTMENT (OUTPATIENT)
Dept: NEUROLOGY | Facility: CLINIC | Age: 40
End: 2025-01-21
Payer: COMMERCIAL

## 2025-01-28 ENCOUNTER — EVALUATION (OUTPATIENT)
Dept: PHYSICAL THERAPY | Facility: CLINIC | Age: 40
End: 2025-01-28
Payer: COMMERCIAL

## 2025-01-28 DIAGNOSIS — G89.29 CHRONIC BILATERAL BACK PAIN: Primary | ICD-10-CM

## 2025-01-28 DIAGNOSIS — M41.86 OTHER FORM OF SCOLIOSIS OF LUMBAR SPINE: ICD-10-CM

## 2025-01-28 DIAGNOSIS — M54.9 CHRONIC BILATERAL BACK PAIN: Primary | ICD-10-CM

## 2025-01-28 DIAGNOSIS — R20.2 TINGLING OF BOTH FEET: ICD-10-CM

## 2025-01-28 PROCEDURE — 97110 THERAPEUTIC EXERCISES: CPT | Mod: GP | Performed by: PHYSICAL THERAPIST

## 2025-01-28 PROCEDURE — 97161 PT EVAL LOW COMPLEX 20 MIN: CPT | Mod: GP | Performed by: PHYSICAL THERAPIST

## 2025-01-28 ASSESSMENT — ENCOUNTER SYMPTOMS
DEPRESSION: 0
OCCASIONAL FEELINGS OF UNSTEADINESS: 0
LOSS OF SENSATION IN FEET: 1

## 2025-01-28 NOTE — PROGRESS NOTES
Physical Therapy    Physical Therapy Evaluation and Treatment    Patient Name: Dot Breen  MRN: [id number]  Encounter Date: 1/28/2025     Time Calculation  Start Time: 1430  Stop Time: 1513  Time Calculation (min): 43 min    Current Problem:   1. Chronic bilateral back pain  Follow Up In Physical Therapy      2. Other form of scoliosis of lumbar spine  Referral to Physical Therapy      3. Tingling of both feet  Follow Up In Physical Therapy          Relevant Past Medical History:  HTN,NAT,ADHD, PTSD, panic attacks,IBS, GERD, gastric bypass 2021/obesity 160 5 foot 4 now, memory impairment-word search,previous diabetes   Surgical History: Cholecystectomy,hysterectomy,bypass  Medications:  reviewed in EMR  Allergies:  Vilazodone,trazodone,Xifaxan    Precautions: none  STEADI Fall Risk: 3 (score of 4+ indicates fall risk)       SUBJECTIVE:   39 y.o. female c/o chronic LBP pain-onset-2020, worsened since 7/2024, radiating down B LE/ankles/ numbness/weakness-to lift. Denies loss bowel or bladder, no foot drop.      Imaging:   XR:  Mild lumbar degenerative changes diffusely, slightly more prominent  L4-S1.  Minimal scoliosis.  Incidental note: transitional L5 vertebral segment.    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,  desk  Hobbies: none  Home Living: kids, apartment, can perform stairs step over step  Current Level of Function: can't exercise, now difficulty walking   Prior Level of Function: wts, walk without pain    Patient/Family Goal: pt wants to get a MRI    Outcome Measures: EDISON 22 /50 points 44%  Other  Measures  Oswestry Disablity Index (EDISON): 22/50    OBJECTIVE:    Cognition: pt reports some word searching issues, good recall or hx and answers questions appropriately  Posture: no lateral shift, iliac crest  LT/RT SH higher   Gait: no foot drop   Functional Mobility: sit to stand   Palpation: non tender to L psp or SI jt, slight tenderness of calf mm not exquisite  Joint Mobility:      L AROM  Flex 25% loss   Ext 25% loss, LBP  Lat flex Lt-, RT full no sx  Rot lt full, no sx   Rot rt full, no sx    LE ROM  HIP   Full hip flexion,  abd, ER not painful     LE MMT:out of 5 scale   Psoas 5-, 5-  Quad 5, 5  Ham 5, 5  Hip abd 4, 4  Hip  add 5, 5   ER 4, 4   TA, EHL, FHL 5, 5 and pt can toe walk   Sensation: intact to LT B/L LE  SLR sign negative RT/LT  Treatments:  Therapeutic Exercise: (10 minutes)   Supine:  SKTC 10 sec x2 started to cause lateral hip pain  S/L clam x10 ea   Bridge trial slightly painful deferred   TVA x5/3 sec   90 90 hamstring stretch trial referred pain so deferred     Manual Therapy: ( minutes)    Gait Training: ( minutes)    Neuromuscular Re-education: ( minutes)    Therapeutic Activity: (5 minutes)  How to supine to sit and log roll  Avoid BLT   How to use pillow for bed postioning to decrease pain     OP Education: Pt educated re eval findings, rx POC/pt goals     HEP:Access Code: [email or web address]  URL: [email or web address]  Date: 01/28/2025  Prepared by: Jennifer Whaley    Exercises  - Supine Transversus Abdominis Bracing with Pelvic Floor Contraction  - 1-2 x daily - 7 x weekly - 1-2 sets - 10 reps - 3 hold  - Clamshell  - 1 x daily - 7 x weekly - 1-2 sets - 10 reps    Patient Education  - Office Posture  - Sleep Positions  - Log Roll    Response to treatment: no greater pain reported     ASSESSMENT:   Pt presents with:reports of LBP/LE pain-calf lower legs, tingling feet, pt is intact to LT of LE, mild hip weakness, increased sx with end range ANDRZEJ or EIS, hamstring tightness, core weakness,mild  loss of lumbar rom  Today pt had poor tolerance to low level michele, she might be a candidate for pool therapy to unload spine as well.  Pt works 2 jobs that make attending PT a challenge  Per XR report:Mild lumbar degenerative change,minimal scoliosis  Decreased capacity to prolong sit 60 min, stand , lift   EDISON 22/50 points indicating impaired function  Co morbidities numerous see PMHX  Pt could benefit from PT to address the above impairments and increase function.    Complexity of Evaluation: moderate  Based on the history including personal factors and/or comorbidities, examination of body systems including body structures and function, activity limitations, and/or participation restrictions, as well as clinical presentation, patient meets criteria for a moderate complexity evaluation.     PLAN:  Review standing calf stretch  Progress DLS and flexibility as pt tolerates   OP PT PLAN:  Treatment/Interventions: Aquatic Therapy , Education/Instruction , Manual Therapy  , Neuromuscular re-education , Self care/home management , Therapeutic activities , and Therapeutic exercise    PT Plan: Skilled PT   PT Frequency: 1 time per week pt requests less follow up with working 2 jobs, she wants 1 in 2 weeks and also is concerned with large copay  Duration:8 sessions  Insurance: 2025: [employer] - NO AUTH / $75 copay / $6250 OOP not met / 60V pt/ot/st/cognitive/pulm rehab - 0 used / REF# [id number] / ds 1/27/25.   Visits Approved: 60  Certification Period Start Date:   Certification Period End Date:   Rehab Potential: Fair chronic pain   Plan of Care Agreement: Patient         Goals:     STG 4  Pt will be compliant/ I with HEP  Pain reduction 25%, currently 3-6/10  Pt will demo correct body mechanics with supine to sit transfers   LTG 8  EDISON improve 10 points indicating improved function, currently 22/50  Pt will increase lumbar arom 25% to improve comfort with sit to stand   Pt will improve hip strength 1/2 to 1 MMT grade to improve  standing and walking tolerance

## 2025-02-12 ENCOUNTER — TREATMENT (OUTPATIENT)
Dept: PHYSICAL THERAPY | Facility: CLINIC | Age: 40
End: 2025-02-12
Payer: COMMERCIAL

## 2025-02-12 DIAGNOSIS — G89.29 CHRONIC BILATERAL BACK PAIN: ICD-10-CM

## 2025-02-12 DIAGNOSIS — M54.9 CHRONIC BILATERAL BACK PAIN: ICD-10-CM

## 2025-02-12 DIAGNOSIS — R20.2 TINGLING OF BOTH FEET: ICD-10-CM

## 2025-02-12 PROCEDURE — 97110 THERAPEUTIC EXERCISES: CPT | Mod: GP | Performed by: PHYSICAL THERAPIST

## 2025-02-12 NOTE — PROGRESS NOTES
Physical Therapy    Physical Therapy Treatment    Patient Name: Dot Breen  MRN: [id number]  Encounter Date: 2/12/2025     Time Calculation  Start Time: 1340  Stop Time: 1419  Time Calculation (min): 39 min    Visit #: 2  out of 8 POC  Insurance: 2025: [employer] - NO AUTH / $75 copay / $6250 OOP not met / 60V pt/ot/st/cognitive/pulm rehab - 0 used / REF# [id number] / ds 1/27/25.   Visits Approved: 60  Evaluation date: 1/28/25      Current Problem:   1. Chronic bilateral back pain  Follow Up In Physical Therapy      2. Tingling of both feet  Follow Up In Physical Therapy          SUBJECTIVE:   LBP 5/10   HEP going ok  Pt reports prolonged sit or stand walk can increase sx   Precautions: gastric bypass, HTN,NAT,ADHD, PTSD, panic attacks,IBS, GERD, gastric bypass 2021/obesity 160 5 foot 4 now, memory impairment-word search,previous diabetes        Pain:   Start of session: 5       OBJECTIVE:  NO LATEX allergies   Decreased lordosis     Treatments:  Therapeutic Exercise: (39 minutes)   Walk x 3 min    Hook:  -Transversus Abdominis Bracing 2x10   -side lying clam shell   -TVA hip adduction 2x10 ball   TVA hip abduction 2x10 L2 green  Trial of bridge caused LBP so deferred  Supine legs over bolster gluteal sets x10/6 sec   SAQ 2x10 ea leg   Piriformis stretch 3x20 sec ea leg   Fig 4 hip stretch 3x20 with gentle OP  Piriformis stretching cross body LT/RT     Manual Therapy: ( minutes)    Gait Training: ( minutes)    Neuromuscular Re-education: ( minutes)    Therapeutic Activity: ( minutes)     All as tolerated   HEP:Access Code: [email or web address]  URL: [email or web address]  Date: 02/12/2025  Prepared by: Jennifer Whaley    Exercises  - Clamshell  - 3 x weekly - 2 sets - 10 reps  - Supine Piriformis Stretch with Foot on Ground  - 1-2 x daily - 7 x weekly - 3 sets - 3 reps - 20 hold  - Supine Figure 4 Piriformis Stretch  - 1-2 x daily - 7 x weekly - 3 reps - 20 hold  - Supine Hip Adduction Isometric with Ball  - 3 x weekly - 2 sets -  10 reps  - Supine Hamstring Stretch  - 1-2 x daily - 7 x weekly - 3 reps - 10 hold  HEP:Access Code: [email or web address]  URL: [email or web address]  Date: 01/28/2025  Prepared by: Jennifer Whaley     Exercises  - Supine Transversus Abdominis Bracing with Pelvic Floor Contraction  - 1-2 x daily - 7 x weekly - 1-2 sets - 10 reps - 3 hold  - Clamshell  - 1 x daily - 7 x weekly - 1-2 sets - 10 reps     Patient Education  - Office Posture  - Sleep Positions  - Log Roll    ASSESSMENT:   Pt with core and hip weakness   Other than the trial of the bridge pt had good tolerance to the michele for core strengthening and hip mobility     Post session pain: no change reported      PLAN:  Pt want to follow up in 2 week, pt to call if having any problem with hep  OP PT PLAN:  Treatment/Interventions: Aquatic Therapy , Education/Instruction , Manual Therapy  , Neuromuscular re-education , Self care/home management , Therapeutic activities , and Therapeutic exercise    PT Plan: Skilled PT   PT Frequency: 1 time per week   Duration:8  Certification Period Start Date:   Certification Period End Date:   Visits Approved: 2025: [employer] - NO AUTH / $75 copay / $6250 OOP not met / 60V pt/ot/st/cognitive/pulm rehab - 0 used / REF# [id number] / ds 1/27/25.   Visits Approved: 60  Rehab Potential: Fair  Plan of Care Agreement: Patient         Goals:   STG 4  Pt will be compliant/ I with HEP  Pain reduction 25%, currently 3-6/10  Pt will demo correct body mechanics with supine to sit transfers   LTG 8  EDISON improve 10 points indicating improved function, currently 22/50  Pt will increase lumbar arom 25% to improve comfort with sit to stand   Pt will improve hip strength 1/2 to 1 MMT grade to improve standing and walking tolerance

## 2025-02-13 ENCOUNTER — HOSPITAL ENCOUNTER (EMERGENCY)
Facility: HOSPITAL | Age: 40
Discharge: HOME | End: 2025-02-14
Attending: EMERGENCY MEDICINE
Payer: COMMERCIAL

## 2025-02-13 ENCOUNTER — APPOINTMENT (OUTPATIENT)
Dept: RADIOLOGY | Facility: HOSPITAL | Age: 40
End: 2025-02-13
Payer: COMMERCIAL

## 2025-02-13 VITALS
HEART RATE: 82 BPM | DIASTOLIC BLOOD PRESSURE: 97 MMHG | BODY MASS INDEX: 29.23 KG/M2 | RESPIRATION RATE: 18 BRPM | OXYGEN SATURATION: 100 % | WEIGHT: 165 LBS | TEMPERATURE: 97.8 F | SYSTOLIC BLOOD PRESSURE: 154 MMHG | HEIGHT: 63 IN

## 2025-02-13 DIAGNOSIS — K62.5 RECTAL BLEEDING: Primary | ICD-10-CM

## 2025-02-13 LAB
ALBUMIN SERPL BCP-MCNC: 4.5 G/DL (ref 3.4–5)
ALP SERPL-CCNC: 49 U/L (ref 33–110)
ALT SERPL W P-5'-P-CCNC: 13 U/L (ref 7–45)
ANION GAP SERPL CALCULATED.3IONS-SCNC: 11 MMOL/L (ref 10–20)
APPEARANCE UR: CLEAR
AST SERPL W P-5'-P-CCNC: 12 U/L (ref 9–39)
B-HCG SERPL-ACNC: <2 MIU/ML
BACTERIA #/AREA URNS AUTO: ABNORMAL /HPF
BASOPHILS # BLD AUTO: 0.04 X10*3/UL (ref 0–0.1)
BASOPHILS NFR BLD AUTO: 0.4 %
BILIRUB SERPL-MCNC: 0.2 MG/DL (ref 0–1.2)
BILIRUB UR STRIP.AUTO-MCNC: NEGATIVE MG/DL
BUN SERPL-MCNC: 19 MG/DL (ref 6–23)
CALCIUM SERPL-MCNC: 9 MG/DL (ref 8.6–10.3)
CHLORIDE SERPL-SCNC: 104 MMOL/L (ref 98–107)
CO2 SERPL-SCNC: 28 MMOL/L (ref 21–32)
COLOR UR: NORMAL
CREAT SERPL-MCNC: 0.79 MG/DL (ref 0.5–1.05)
EGFRCR SERPLBLD CKD-EPI 2021: >90 ML/MIN/1.73M*2
EOSINOPHIL # BLD AUTO: 0.22 X10*3/UL (ref 0–0.7)
EOSINOPHIL NFR BLD AUTO: 2.4 %
ERYTHROCYTE [DISTWIDTH] IN BLOOD BY AUTOMATED COUNT: 12 % (ref 11.5–14.5)
GLUCOSE SERPL-MCNC: 118 MG/DL (ref 74–99)
GLUCOSE UR STRIP.AUTO-MCNC: NORMAL MG/DL
HCG UR QL IA.RAPID: NEGATIVE
HCT VFR BLD AUTO: 41.3 % (ref 36–46)
HGB BLD-MCNC: 14.2 G/DL (ref 12–16)
IMM GRANULOCYTES # BLD AUTO: 0.02 X10*3/UL (ref 0–0.7)
IMM GRANULOCYTES NFR BLD AUTO: 0.2 % (ref 0–0.9)
KETONES UR STRIP.AUTO-MCNC: NEGATIVE MG/DL
LACTATE SERPL-SCNC: 1.2 MMOL/L (ref 0.4–2)
LEUKOCYTE ESTERASE UR QL STRIP.AUTO: NEGATIVE
LIPASE SERPL-CCNC: 55 U/L (ref 9–82)
LYMPHOCYTES # BLD AUTO: 3.06 X10*3/UL (ref 1.2–4.8)
LYMPHOCYTES NFR BLD AUTO: 32.9 %
MCH RBC QN AUTO: 30.9 PG (ref 26–34)
MCHC RBC AUTO-ENTMCNC: 34.4 G/DL (ref 32–36)
MCV RBC AUTO: 90 FL (ref 80–100)
MONOCYTES # BLD AUTO: 0.7 X10*3/UL (ref 0.1–1)
MONOCYTES NFR BLD AUTO: 7.5 %
MUCOUS THREADS #/AREA URNS AUTO: ABNORMAL /LPF
NEUTROPHILS # BLD AUTO: 5.26 X10*3/UL (ref 1.2–7.7)
NEUTROPHILS NFR BLD AUTO: 56.6 %
NITRITE UR QL STRIP.AUTO: NEGATIVE
NRBC BLD-RTO: 0 /100 WBCS (ref 0–0)
PH UR STRIP.AUTO: 6.5 [PH]
PLATELET # BLD AUTO: 352 X10*3/UL (ref 150–450)
POTASSIUM SERPL-SCNC: 4.1 MMOL/L (ref 3.5–5.3)
PROT SERPL-MCNC: 7.5 G/DL (ref 6.4–8.2)
PROT UR STRIP.AUTO-MCNC: NORMAL MG/DL
RBC # BLD AUTO: 4.59 X10*6/UL (ref 4–5.2)
RBC # UR STRIP.AUTO: NEGATIVE MG/DL
RBC #/AREA URNS AUTO: ABNORMAL /HPF
SODIUM SERPL-SCNC: 139 MMOL/L (ref 136–145)
SP GR UR STRIP.AUTO: 1.03
SQUAMOUS #/AREA URNS AUTO: ABNORMAL /HPF
UROBILINOGEN UR STRIP.AUTO-MCNC: NORMAL MG/DL
WBC # BLD AUTO: 9.3 X10*3/UL (ref 4.4–11.3)
WBC #/AREA URNS AUTO: ABNORMAL /HPF

## 2025-02-13 PROCEDURE — 83690 ASSAY OF LIPASE: CPT

## 2025-02-13 PROCEDURE — 74177 CT ABD & PELVIS W/CONTRAST: CPT

## 2025-02-13 PROCEDURE — 99285 EMERGENCY DEPT VISIT HI MDM: CPT | Mod: 25 | Performed by: EMERGENCY MEDICINE

## 2025-02-13 PROCEDURE — 2550000001 HC RX 255 CONTRASTS

## 2025-02-13 PROCEDURE — 83605 ASSAY OF LACTIC ACID: CPT

## 2025-02-13 PROCEDURE — 84702 CHORIONIC GONADOTROPIN TEST: CPT

## 2025-02-13 PROCEDURE — 81001 URINALYSIS AUTO W/SCOPE: CPT

## 2025-02-13 PROCEDURE — 96374 THER/PROPH/DIAG INJ IV PUSH: CPT

## 2025-02-13 PROCEDURE — 96375 TX/PRO/DX INJ NEW DRUG ADDON: CPT

## 2025-02-13 PROCEDURE — 74177 CT ABD & PELVIS W/CONTRAST: CPT | Mod: FOREIGN READ | Performed by: RADIOLOGY

## 2025-02-13 PROCEDURE — 36415 COLL VENOUS BLD VENIPUNCTURE: CPT

## 2025-02-13 PROCEDURE — 80053 COMPREHEN METABOLIC PANEL: CPT

## 2025-02-13 PROCEDURE — 85025 COMPLETE CBC W/AUTO DIFF WBC: CPT

## 2025-02-13 PROCEDURE — 81025 URINE PREGNANCY TEST: CPT

## 2025-02-13 PROCEDURE — 2500000004 HC RX 250 GENERAL PHARMACY W/ HCPCS (ALT 636 FOR OP/ED)

## 2025-02-13 RX ORDER — KETOROLAC TROMETHAMINE 15 MG/ML
15 INJECTION, SOLUTION INTRAMUSCULAR; INTRAVENOUS ONCE
Status: COMPLETED | OUTPATIENT
Start: 2025-02-13 | End: 2025-02-13

## 2025-02-13 RX ORDER — ONDANSETRON HYDROCHLORIDE 2 MG/ML
4 INJECTION, SOLUTION INTRAVENOUS ONCE
Status: COMPLETED | OUTPATIENT
Start: 2025-02-13 | End: 2025-02-13

## 2025-02-13 RX ADMIN — KETOROLAC TROMETHAMINE 15 MG: 15 INJECTION, SOLUTION INTRAMUSCULAR; INTRAVENOUS at 20:55

## 2025-02-13 RX ADMIN — IOHEXOL 75 ML: 350 INJECTION, SOLUTION INTRAVENOUS at 21:57

## 2025-02-13 RX ADMIN — ONDANSETRON 4 MG: 2 INJECTION, SOLUTION INTRAMUSCULAR; INTRAVENOUS at 20:55

## 2025-02-13 ASSESSMENT — PAIN DESCRIPTION - PAIN TYPE: TYPE: ACUTE PAIN

## 2025-02-13 ASSESSMENT — LIFESTYLE VARIABLES
HAVE YOU EVER FELT YOU SHOULD CUT DOWN ON YOUR DRINKING: NO
TOTAL SCORE: 0
EVER FELT BAD OR GUILTY ABOUT YOUR DRINKING: NO
HAVE PEOPLE ANNOYED YOU BY CRITICIZING YOUR DRINKING: NO
EVER HAD A DRINK FIRST THING IN THE MORNING TO STEADY YOUR NERVES TO GET RID OF A HANGOVER: NO

## 2025-02-13 ASSESSMENT — PAIN - FUNCTIONAL ASSESSMENT: PAIN_FUNCTIONAL_ASSESSMENT: 0-10

## 2025-02-13 ASSESSMENT — PAIN SCALES - GENERAL: PAINLEVEL_OUTOF10: 7

## 2025-02-13 ASSESSMENT — PAIN DESCRIPTION - LOCATION: LOCATION: RECTUM

## 2025-02-14 ENCOUNTER — TELEPHONE (OUTPATIENT)
Dept: SURGERY | Facility: CLINIC | Age: 40
End: 2025-02-14
Payer: COMMERCIAL

## 2025-02-14 LAB — HOLD SPECIMEN: NORMAL

## 2025-02-14 NOTE — ED TRIAGE NOTES
TRIAGE NOTE   I saw the patient as the Clinician in Triage and performed a brief history and physical exam, established acuity, and ordered appropriate tests to develop basic plan of care. Patient will be seen by an [facility], resident and/or physician who will independently evaluate the patient. Please see subsequent provider notes for further details and disposition.     Brief HPI: In brief, Dot Breen is a 39 y.o. female that presents for lower abdominal pain, cramping, and rectal bleeding.  Patient states that over last 2 days she has been having intermittent lower abdominal cramping worse in the left lower quadrant.  She states that today when she went to urinate she wiped and noticed that she had bright red blood priming from her rectum.  She states that she has been constipated lately and has been straining, does not have any rectal pain.  She states later on when she had a bowel movement she did not notice any blood in the stool.  She is concerned she is been having lower abdominal cramping mixed with his rectal bleeding.  She notes a history of gastric ulcer that is perforated in the past and multiple abdominal surgeries.  She was generally rundown and fatigued.     Focused Physical exam:   Gen: Well nourished, well developed in no distress. Good historian and answers questions appropriately.    Resp: Breath sounds equal and clear bilaterally. No adventitious sounds. No increased effort of breathing.    CV: Heart S1, S2 regular rate and rhythm. No m/r/g. Distal pulses 3+ symmetric bilaterally. No lower extremity edema.    ABD/: Soft, tenderness palpation over lower abdomen and left lower quadrant. Bowel sounds equal in all four quadrants. No guarding or rebound tenderness. No distention, masses, or organomegaly.    MSK: ROM of all extremities. No joint effusions, clubbing, cyanosis, or edema.    Neuro/Psych: A&Ox3. No focal motor or sensory deficits. Appropriate mood and behavior.  Plan/MDM:   Labs and  imaging ordered for further evaluation patient symptoms.  Please see subsequent provider note for further details and disposition

## 2025-02-14 NOTE — DISCHARGE INSTRUCTIONS
Thank you for choosing [facility]. It was my pleasure to be involved in your care today.         As of today's visit, based on reasonable likelihood, that it is safe for you to be discharged back to your residence to follow-up as an outpatient for ongoing management of your medical problem. You should follow-up with any referrals / primary provider as soon as possible. The contacts (number, addresses) are listed below.         Important:  Even though we think it is safe for you to go home, there is always a small chance that we are missing something that could require hospitalization.  Therefore it is very important that if you get worse or develops any new symptoms that you return here as soon as possible to be re-evaluated.  This includes return of symptoms that have resolved such as fainting, chest pain, or symptoms that could be warning signs for stroke important:  Even though we think it is safe for you to go home, there is always a small chance that we are missing something that could require hospitalization.  Therefore it is very important that if you get worse or develops any new symptoms that you return here as soon as possible to be re-evaluated.  This includes return of symptoms that have resolved such as fainting, chest pain, or symptoms that could be warning signs for stroke         Make sure your pharmacy and primary doctor is aware of any new medications prescribed today.          It is your responsibility to contact as soon as possible, and follow through with, any referrals you were given today. We do recommend you inform them you are a [facility] follow-up patient, as often they can better accommodate your need to be seen, provided their schedules allow. We will, and have, made every effort to ensure you have access to adequate follow-up specialists available.          All problems may not be able to be fixed in one ER visit. This is why timely ongoing care is important, and this  is a responsibility you share in. Further, you are free to follow up with any provider you choose, and this is not limited to our suggestion.          If cultures were obtained today, you will be contacted should anything result that would require further treatment. Please contact the ED at the number provided with questions.          Having trouble affording medications? Try [email or web address]! (This is not a hospital endorsed website, merely a recommendation based on my own personal experiences with [employer])

## 2025-02-14 NOTE — ED PROVIDER NOTES
HPI   Chief Complaint   Patient presents with    Rectal Bleeding       HPI  39-year-old female presents with complaint rectal bleeding.  The patient went to the bathroom today she urinated she noticed a little blood when she wipes.  She has a history of some hemorrhoids.  She is also been constipated recently.  Complains of mild diffuse abdominal pain associated with it.  No nausea or vomiting.  She has been taking MiraLAX for constipation.  No other complaints.      Patient History   Past Medical History:   Diagnosis Date    HTN (hypertension)     Other specified diabetes mellitus without complications     Diabetes mellitus of other type without complication    Personal history of other diseases of the circulatory system     History of hypertension    Personal history of other mental and behavioral disorders 2017    History of anxiety disorder    Unspecified asthma, uncomplicated ([facility]) 2017    Asthmatic bronchitis     Past Surgical History:   Procedure Laterality Date    CHOLECYSTECTOMY      HYSTERECTOMY      OTHER SURGICAL HISTORY  2021    Gastric bypass surgery    OTHER SURGICAL HISTORY  2024    OTHER SURGICAL HISTORY      LAPAROSCOPIC LYSIS OF ADHESIONS/ ESOPHAGOGASTRODUODENOSCOPY  ([facility])     Family History   Problem Relation Name Age of Onset    Heart disease Mother  59    Other (CA of lung) Father      No Known Problems Brother      No Known Problems Daughter      No Known Problems Son       Social History     Tobacco Use    Smoking status: Former     Current packs/day: 0.00     Types: Cigarettes     Quit date:      Years since quittin.1     Passive exposure: Never    Smokeless tobacco: Never   Vaping Use    Vaping status: Never Used   Substance Use Topics    Alcohol use: Never    Drug use: Never       Physical Exam   ED Triage Vitals [25]   Temperature Heart Rate Respirations BP   36.6 °C (97.8 °F) 82 18 (!) 154/97      Pulse Ox Temp src Heart Rate  Source Patient Position   100 % -- -- --      BP Location FiO2 (%)     -- --       Physical Exam  General:  Awake, alert, no acute distress.  Head: Normocephalic, Atraumatic  Neck: Supple, trachea midline, no stridor  Skin: Warm and dry, no rashes   Lungs:  No acute respiratory distress, speaking in full sentences without difficulty  Abdomen: Nondistended  Neuro:  No gross focal neurologic deficits, NIH is 0  Musculoskeletal:  Full range of motion in all 4 extremities  Psychiatric:  Alert oriented x 3, Good insight into condition.    ED Course & MDM   Diagnoses as of 02/14/25 0006   Rectal bleeding                 No data recorded     Vandana Coma Scale Score: 15 (02/13/25 2032 : Ernestine Busch RN)                           Medical Decision Making  Patient's workup in the ED is unremarkable.  Her vital signs are stable.  Her hemoglobin is stable.  Discussed this with her at bedside.  Reassured her that she does not have a perforated ulcer like she has had in the past.  It could be related to her constipation or possibly internal hemorrhoids.  We discussed treatment.  She would prefer to just continue with the MiraLAX and follow-up with her doctor.  I advised her to return if condition should worsen.  Stable for discharge.    Procedure  Procedures     Scottie Arrington,   02/14/25 0008

## 2025-02-14 NOTE — TELEPHONE ENCOUNTER
patient called office reports that she was seen in [facility] yesterday. (See progress note in epic from ER Visit) Patient was told to follow up with her pcp while in the ER. Patient is requesting refill on previously ordered medication   Patient instructed to contact her pcp and see if they can help her with medication request.  Patient was asked if the ER had prescribed medication/ she said that they did not. Patient has had multiple surgeries with Dr. Kulkarni/ the last being in March 2024 lysis of adhesions.  She had bariatric surgery in 05/2021 rygb.

## 2025-02-14 NOTE — ED TRIAGE NOTES
Pt to ED for c/o rectal bleeding that began today. Pt states she went to the restroom and had blood on the toilet paper after wiping. Pt admits to constipation and some straining. Pt also states hx of ulcers. VSS.

## 2025-02-16 DIAGNOSIS — M54.16 LUMBAR RADICULOPATHY: ICD-10-CM

## 2025-02-18 RX ORDER — TIZANIDINE 2 MG/1
TABLET ORAL
Qty: 90 TABLET | Refills: 0 | Status: SHIPPED | OUTPATIENT
Start: 2025-02-18

## 2025-02-26 ENCOUNTER — APPOINTMENT (OUTPATIENT)
Dept: PHYSICAL THERAPY | Facility: CLINIC | Age: 40
End: 2025-02-26
Payer: COMMERCIAL

## 2025-02-28 DIAGNOSIS — I10 PRIMARY HYPERTENSION: ICD-10-CM

## 2025-03-04 RX ORDER — BISOPROLOL FUMARATE 5 MG/1
5 TABLET, FILM COATED ORAL DAILY
Qty: 90 TABLET | Refills: 3 | Status: SHIPPED | OUTPATIENT
Start: 2025-03-04

## 2025-03-05 ENCOUNTER — OFFICE VISIT (OUTPATIENT)
Dept: NEUROLOGY | Facility: HOSPITAL | Age: 40
End: 2025-03-05
Payer: COMMERCIAL

## 2025-03-05 VITALS — HEART RATE: 67 BPM | SYSTOLIC BLOOD PRESSURE: 115 MMHG | DIASTOLIC BLOOD PRESSURE: 73 MMHG

## 2025-03-05 DIAGNOSIS — R20.0 NUMBNESS IN BOTH LEGS: Primary | ICD-10-CM

## 2025-03-05 DIAGNOSIS — R29.2 HYPERREFLEXIA: ICD-10-CM

## 2025-03-05 DIAGNOSIS — F40.240 CLAUSTROPHOBIA: ICD-10-CM

## 2025-03-05 PROCEDURE — 3078F DIAST BP <80 MM HG: CPT

## 2025-03-05 PROCEDURE — 99215 OFFICE O/P EST HI 40 MIN: CPT | Mod: GC

## 2025-03-05 PROCEDURE — 3074F SYST BP LT 130 MM HG: CPT

## 2025-03-05 PROCEDURE — 99205 OFFICE O/P NEW HI 60 MIN: CPT

## 2025-03-05 RX ORDER — LORAZEPAM 1 MG/1
1 TABLET ORAL
Qty: 1 TABLET | Refills: 0 | Status: SHIPPED | OUTPATIENT
Start: 2025-03-05 | End: 2025-03-05

## 2025-03-05 NOTE — PATIENT INSTRUCTIONS
Ms. Breen,    You were seen for evaluation of leg cramps. It is unclear of the cause, but we are suspicious of either peripheral nerve disease or disease of the nerves in the spine that go to the legs. We would like to get some lab work and imaging of your brain and spine to look into this further. You can try magnesium for the cramps, 400mg at night. This can be obtained over the counter.    Please see us in 1 month after these tests.    If there are any issues or concerns, please call [phone] to reach our office.    It was a pleasure seeing you,  Dr. Lainez and Dr. Jacome with [facility]

## 2025-03-05 NOTE — PROGRESS NOTES
"Subjective     Dot Breen is a 39 y.o. year old female    HPI  Referred by PCP for leg pain causing difficulty with walking.    Describes cramping in her calves worse with exertion. Describes bilateral feet getting \"stuck\" with stabbing/burning pains, only one foot at a time. Lateral foot can go into extensor position with pains. Unresponsive to cyclobenzaprine and gabapentin 300 BID. Onset around 9-10 months ago. Episodes occur most days, increasing in frequency over time. Pain can be intense enough to cause fall. Has intermittent tingling in toes with pain, but no other paresthesias. Pain typically occurs at end of day when she lays down. Does not seem to change when she gets up and walks around. Can affect gait, but more so due to pain.    Also notes paraphasic errors, alternating words. Also describes mental fog. Has generalized itching responsive to steroid cream and OTC allergy medications. Notes long history of difficulty swallowing, no finding on GI evaluation. States she feels her eyes go \"numb\" for a few seconds. No other visual symptoms. Has long history of back pain. In last year, has had issue with constipation, which is atypical for her as she usually has IBS-D. Has also had stomach ulcers. Has urinary urge, but no incontinence.     She has generalized weakness, which she thinks is more due to deconditioning. She reports she has had evaluation by multiple specialists without clear diagnosis.    Social: ~ 1ppd for 20yes, quit 10 years ago. No alcohol, no recreational substance use. Works as a .    Family Hx: Father - lung cancer; Mother - heart disease; Uncle - blood cancer.    Review of Systems    Patient Active Problem List   Diagnosis    Gastroesophageal reflux disease without esophagitis    GI bleed    Pain of upper abdomen    Surgery follow-up examination    Attention deficit hyperactivity disorder (ADHD), combined type    Bipolar I disorder (Multi)    Posttraumatic stress " disorder    NAT (generalized anxiety disorder)    Insomnia related to another mental disorder    Fofana's esophageal ulceration    Biliary dyskinesia    Chronic pain    Chronic pain of right elbow    Inflammatory dermatosis    Diabetes mellitus without complication (Multi)    Diastolic hypertension    Dizziness and giddiness    Dysmenorrhea    Elevated TSH    Fatigue    Gastroenteritis    Globus sensation    Granuloma inguinale    Hyperlipidemia    Hyperparathyroidism (Multi)    Intestinal malabsorption    Irregular menses    Jaw pain    Jejunal ulcer    Laceration of finger    Laryngopharyngeal reflux    Lipoma of right lower extremity    Memory impairment    Menorrhagia    Metrorrhagia    Moderate episode of recurrent major depressive disorder    Muscle twitch    Constipation    Disorder of abdomen    Nausea    Obesity    Endometriosis    Obstructive sleep apnea    Otitis media    Palpitations    Panic attack    Panic disorder without agoraphobia    Abnormal taste in mouth    Paresthesia    Previous  delivery, antepartum condition or complication ([facility])    Pyrexia of unknown origin    Right elbow tendonitis    Strain of unspecified muscle, fascia and tendon at shoulder and upper arm level, right arm, initial encounter    Stress incontinence in female    Throat clearing    Vitamin D deficiency    Epigastric pain    Hip pain    Backache    Arthralgia    Back strain    Acute gastric ulcer without hemorrhage or perforation    Acute allergic rhinitis due to pollen    Acquired hypothyroidism    Abnormal vaginal bleeding    Vitamin B12 deficiency    Lumbar radiculopathy    Sciatic nerve pain    Pain in both lower extremities    Myalgia    Chronic bilateral back pain    Tingling of both feet     Past Medical History:   Diagnosis Date    HTN (hypertension)     Other specified diabetes mellitus without complications     Diabetes mellitus of other type without complication    Personal history of other diseases of  the circulatory system     History of hypertension    Personal history of other mental and behavioral disorders 2017    History of anxiety disorder    Unspecified asthma, uncomplicated ([facility]) 2017    Asthmatic bronchitis     Past Surgical History:   Procedure Laterality Date    CHOLECYSTECTOMY      HYSTERECTOMY      OTHER SURGICAL HISTORY  2021    Gastric bypass surgery    OTHER SURGICAL HISTORY  2024    OTHER SURGICAL HISTORY      LAPAROSCOPIC LYSIS OF ADHESIONS/ ESOPHAGOGASTRODUODENOSCOPY  ([facility])     Social History     Tobacco Use    Smoking status: Former     Current packs/day: 0.00     Types: Cigarettes     Quit date:      Years since quittin.1     Passive exposure: Never    Smokeless tobacco: Never   Substance Use Topics    Alcohol use: Never     family history includes CA of lung in her father; Heart disease (age of onset: 59) in her mother; No Known Problems in her brother, daughter, and son.    Current Outpatient Medications:     acetaminophen (Tylenol) 500 mg tablet, Take 2 tablets (1,000 mg) by mouth every 6 hours if needed for mild pain (1 - 3)., Disp: , Rfl:     Al hyd-Mg tr-alg ac-sod bicarb (Gaviscon) 80-14.2 mg tablet,chewable, Chew 2 tablets 3 times a day., Disp: 180 tablet, Rfl: 11    amLODIPine (Norvasc) 5 mg tablet, Take 1 tablet (5 mg) by mouth once daily., Disp: 90 tablet, Rfl: 3    bisoprolol (Zebeta) 5 mg tablet, TAKE 1 TABLET BY MOUTH ONCE DAILY, Disp: 90 tablet, Rfl: 3    cyclobenzaprine (Flexeril) 10 mg tablet, Take 1 tablet (10 mg) by mouth 2 times a day as needed for muscle spasms for up to 10 days., Disp: 20 tablet, Rfl: 0    ergocalciferol (DrisdoL) 1.25 MG (26796 UT) capsule, Take 1 capsule (1,250 mcg) by mouth 1 (one) time per week. Do not fill before 2024., Disp: 12 capsule, Rfl: 3    Gaviscon Extra Strength 160-105 mg tablet,chewable, CHEW 1 TABLET BY MOUTH THREE TIMES DAILY, Disp: , Rfl:     hydrOXYzine pamoate (Vistaril) 25 mg  capsule, TAKE 1 CAPSULE(25 MG) BY MOUTH THREE TIMES DAILY FOR UP TO 10 DAYS AS NEEDED FOR ITCHING, Disp: 30 capsule, Rfl: 3    levocetirizine (Xyzal) 5 mg tablet, Take 1 tablet (5 mg) by mouth once daily at bedtime., Disp: , Rfl:     multivit-minerals/folic acid (WOMEN'S MULTIVITAMIN GUMMIES ORAL), Take by mouth., Disp: , Rfl:     omeprazole (PriLOSEC) 40 mg DR capsule, TAKE 1 CAPSULE(40 MG) BY MOUTH DAILY, Disp: 90 capsule, Rfl: 3    polyethylene glycol (Glycolax, Miralax) 17 gram packet, Take 17 g by mouth once daily., Disp: , Rfl:     pregabalin (Lyrica) 50 mg capsule, Take 1 capsule nightly for 1 week then take 1 capsule twice a day thereafter, Disp: 60 capsule, Rfl: 0    tiZANidine (Zanaflex) 2 mg tablet, TAKE 1 TABLET(2 MG) BY MOUTH THREE TIMES DAILY AS NEEDED FOR MUSCLE SPASMS OR BACK PAIN, Disp: 90 tablet, Rfl: 0    triamcinolone (Kenalog) 0.1 % cream, Apply topically., Disp: , Rfl:   Allergies   Allergen Reactions    Vilazodone Rash    Trazodone Rash    Xifaxan [Rifaximin] Rash       Objective   Neurological Exam  Physical Exam  GENERAL APPEARANCE:  No distress, alert and cooperative.     MENTAL STATE:  Orientation was normal to time, place and person. Recent and remote memory was intact.  Attention span and concentration were normal. Language testing was normal for comprehension, repetition, expression, and naming. General fund of knowledge was intact.     CRANIAL NERVES:  Cranial nerves were normal.      CN 2- Visual fields full to confrontation.      CN 3, 4, 6-  Pupils round, 4 mm in diameter, equally reactive to light. No ptosis. EOMs normal alignment, full range of movement, no nystagmus     CN 5- Facial sensation intact bilaterally.     CN 7- Normal and symmetric facial strength. Nasolabial folds symmetric.     CN 8- Hearing intact to converation.      CN 9- Palate elevates symmetrically.      CN 11- Normal strength of shoulder shrug and neck turning      CN 12- Tongue midline, with normal bulk and  strength; no fasciculations.     MOTOR:  Motor exam was normal. Muscle bulk and tone were normal in both upper and lower extremities. Muscle strength was 5/5 in distal and proximal muscles in both upper and lower extremities. Mild bilateral postural tremor in hands.    REFLEXES:  Right/ Left:  Biceps 3+/3+, brachioradialis 3+/3+, triceps 3+/3+, patellar 3+/3+, ankle 3+/3+  Babinski: toes downgoing to plantar stimulation. No clonus. Positive Johnston on L, positive tromner bilaterally. Pectoral reflex present bilaterally. Suprapatellar and adductor reflexes present bilaterally.    SENSORY: Sensory exam was intact to light touch, sharp/dull, vibration and temperature in both UE and LE.     COORDINATION: In UE- finger-nose-finger was intact and in LE- heel-to-shin was intact without dysmetria or overshoot.      GAIT: Station was stable with a normal base and negative Romberg sign. Gait was stable with a normal arm swing and speed. No ataxia, shuffling, steppage or waddling was noted.    Lab Results   Component Value Date    OQIISIME13 348 01/09/2025     Vitamin D 25 - 73    Lab Results   Component Value Date    TSH 0.63 10/03/2023     Lab Results   Component Value Date    GLUCOSE 118 (H) 02/13/2025    CALCIUM 9.0 02/13/2025     02/13/2025    K 4.1 02/13/2025    CO2 28 02/13/2025     02/13/2025    BUN 19 02/13/2025    CREATININE 0.79 02/13/2025         Assessment/Plan   Ms. Dot Breen is a 40yo female with PMH dysphagia, hyperparathyroidism, hypothyroidism, gastric bypass c/b vitamin deficiencies, stomach ulcers, back pain, unspecified connective tissue disease, anxiety, and bipolar disease who presents for evaluation of 10 month history of lower extremity cramping. She has risk factors for peripheral and central cause. Gastric bypass increases her risk of vitamin deficiencies, B12 levels were borderline, and she has past history of prediabetes increasing her risk of peripheral neuropathy which could cause  cramps. Against this, her exam today showed no evidence of small fiber length dependent neuropathy. Myopathy is also considered as a peripheral cause. Will order lab work to investigate these. While uncommon, spasms can be presenting symptom of multiple sclerosis. No other episodes of focal neurologic deficits on history, but does have significant diffuse hyperreflexia on exam including several pathologic reflexes. While possible in otherwise normal individual, will evaluate for possible MS with MRI. Advised on OTC methods of managing cramp pains.    Plan:  - B6, MMA, vitamin E for neuropathy eval  - CK, LDH, aldolase, CRP for myopathy eval  - MRI Brain and C-spine w/wo contrast for MS eval    Ochoa Lainez MD  Neurology, PGY-3    Patient seen and discussed with attending physician Dr. Jacome.

## 2025-03-08 LAB
A-TOCOPHEROL VIT E SERPL-MCNC: NORMAL
ALDOLASE SERPL-CCNC: NORMAL
BETA+GAMMA TOCOPHEROL SERPL-MCNC: NORMAL MG/DL
CK SERPL-CCNC: 36 U/L (ref 20–239)
CRP SERPL-MCNC: NORMAL MG/L
LDH SERPL P TO L-CCNC: 134 U/L (ref 100–200)
METHYLMALONATE SERPL-SCNC: NORMAL
PYRIDOXAL PHOS SERPL-MCNC: NORMAL UG/L

## 2025-03-12 ENCOUNTER — APPOINTMENT (OUTPATIENT)
Dept: RADIOLOGY | Facility: HOSPITAL | Age: 40
End: 2025-03-12
Payer: COMMERCIAL

## 2025-03-12 LAB
A-TOCOPHEROL VIT E SERPL-MCNC: 14.1 MG/L (ref 5.7–19.9)
ALDOLASE SERPL-CCNC: 3.2 U/L
BETA+GAMMA TOCOPHEROL SERPL-MCNC: <1 MG/L
CK SERPL-CCNC: 36 U/L (ref 20–239)
CRP SERPL-MCNC: <3 MG/L
LDH SERPL P TO L-CCNC: 134 U/L (ref 100–200)
METHYLMALONATE SERPL-SCNC: 321 NMOL/L (ref 55–335)
PYRIDOXAL PHOS SERPL-MCNC: 25 NG/ML (ref 2.1–21.7)

## 2025-03-13 ENCOUNTER — APPOINTMENT (OUTPATIENT)
Dept: RADIOLOGY | Facility: HOSPITAL | Age: 40
End: 2025-03-13
Payer: COMMERCIAL

## 2025-03-14 ENCOUNTER — APPOINTMENT (OUTPATIENT)
Dept: RADIOLOGY | Facility: CLINIC | Age: 40
End: 2025-03-14
Payer: COMMERCIAL

## 2025-03-26 DIAGNOSIS — M54.16 LUMBAR RADICULOPATHY: ICD-10-CM

## 2025-03-27 RX ORDER — TIZANIDINE 2 MG/1
TABLET ORAL
Qty: 90 TABLET | Refills: 0 | Status: SHIPPED | OUTPATIENT
Start: 2025-03-27

## 2025-04-25 DIAGNOSIS — I10 PRIMARY HYPERTENSION: ICD-10-CM

## 2025-04-29 RX ORDER — AMLODIPINE BESYLATE 5 MG/1
5 TABLET ORAL DAILY
Qty: 90 TABLET | Refills: 3 | Status: SHIPPED | OUTPATIENT
Start: 2025-04-29 | End: 2025-05-02

## 2025-04-29 NOTE — TELEPHONE ENCOUNTER
Please send the attached prescription to the patient's pharmacy as soon as possible. Thank you!    Requested Prescriptions     Pending Prescriptions Disp Refills    amLODIPine (Norvasc) 5 mg tablet [Pharmacy Med Name: AMLODIPINE BESYLATE 5MG TABLETS] 90 tablet 3     Sig: TAKE 1 TABLET(5 MG) BY MOUTH DAILY

## 2025-05-01 DIAGNOSIS — I10 PRIMARY HYPERTENSION: ICD-10-CM

## 2025-05-02 RX ORDER — AMLODIPINE BESYLATE 5 MG/1
5 TABLET ORAL DAILY
Qty: 90 TABLET | Refills: 3 | Status: SHIPPED | OUTPATIENT
Start: 2025-05-02 | End: 2026-04-27

## 2025-06-03 ENCOUNTER — DOCUMENTATION (OUTPATIENT)
Dept: PHYSICAL THERAPY | Facility: CLINIC | Age: 40
End: 2025-06-03
Payer: COMMERCIAL

## 2025-06-03 DIAGNOSIS — M54.16 LUMBAR RADICULOPATHY: ICD-10-CM

## 2025-06-03 NOTE — PROGRESS NOTES
Physical Therapy    Discharge Summary    Name: Dot Breen  MRN: [id number]  : 1985  Date: 25    Discharge Summary: PT    Discharge Information: Date of discharge 6/3/25, Date of last visit 25, Date of evaluation 25, Number of attended visits 2, Referred by Rodriguez, and Referred for chronic LBP    Therapy Summary: pt was last  seen 25 and pt did not return, pt cancel  with Covid     Discharge Status: unable to assess goals as pt did not complete POC     Rehab Discharge Reason: Failed to schedule and/or keep follow-up appointment(s)

## 2025-06-04 RX ORDER — PREGABALIN 50 MG/1
50 CAPSULE ORAL 2 TIMES DAILY
Qty: 60 CAPSULE | Refills: 0 | Status: SHIPPED | OUTPATIENT
Start: 2025-06-04 | End: 2025-07-04

## 2025-06-08 ENCOUNTER — APPOINTMENT (OUTPATIENT)
Dept: RADIOLOGY | Facility: HOSPITAL | Age: 40
End: 2025-06-08
Payer: COMMERCIAL

## 2025-06-09 ENCOUNTER — APPOINTMENT (OUTPATIENT)
Dept: RADIOLOGY | Facility: HOSPITAL | Age: 40
End: 2025-06-09
Payer: COMMERCIAL

## 2025-06-11 ENCOUNTER — TELEPHONE (OUTPATIENT)
Dept: NEUROLOGY | Facility: CLINIC | Age: 40
End: 2025-06-11
Payer: COMMERCIAL

## 2025-06-11 ENCOUNTER — APPOINTMENT (OUTPATIENT)
Dept: NEUROLOGY | Facility: HOSPITAL | Age: 40
End: 2025-06-11
Payer: COMMERCIAL

## 2025-06-11 DIAGNOSIS — R10.10 PAIN OF UPPER ABDOMEN: ICD-10-CM

## 2025-06-11 DIAGNOSIS — K21.9 GASTROESOPHAGEAL REFLUX DISEASE WITHOUT ESOPHAGITIS: ICD-10-CM

## 2025-06-11 DIAGNOSIS — F40.240 CLAUSTROPHOBIA: ICD-10-CM

## 2025-06-11 RX ORDER — LORAZEPAM 1 MG/1
1 TABLET ORAL
Qty: 1 TABLET | Refills: 0 | Status: SHIPPED | OUTPATIENT
Start: 2025-06-11 | End: 2025-06-11

## 2025-06-11 NOTE — TELEPHONE ENCOUNTER
Dot Breen called. She is having her MRI on 06/14/2025 at [facility]. She misplaced the oral sedative you gave her in March 2025 for same. She would like you to send a new script for oral sedative prior to her MRI. Please send to [facility] (on file)

## 2025-06-11 NOTE — TELEPHONE ENCOUNTER
Alerted to patient having lost PRN lorazepam dose for mild sedation for MRI. Reviewed PDMP and did not find any concerning prescription activity. Called patient and informed her of the refill.    Ochoa Lainez MD  Neurology, PGY-3

## 2025-06-12 RX ORDER — OMEPRAZOLE 40 MG/1
40 CAPSULE, DELAYED RELEASE ORAL DAILY
Qty: 30 CAPSULE | Refills: 0 | Status: SHIPPED | OUTPATIENT
Start: 2025-06-12 | End: 2025-07-12

## 2025-06-14 ENCOUNTER — APPOINTMENT (OUTPATIENT)
Dept: RADIOLOGY | Facility: HOSPITAL | Age: 40
End: 2025-06-14
Payer: COMMERCIAL

## 2025-06-14 ENCOUNTER — HOSPITAL ENCOUNTER (OUTPATIENT)
Dept: RADIOLOGY | Facility: HOSPITAL | Age: 40
Discharge: HOME | End: 2025-06-14
Payer: COMMERCIAL

## 2025-06-14 DIAGNOSIS — R20.0 NUMBNESS IN BOTH LEGS: ICD-10-CM

## 2025-06-14 DIAGNOSIS — R29.2 HYPERREFLEXIA: ICD-10-CM

## 2025-06-14 PROCEDURE — 72156 MRI NECK SPINE W/O & W/DYE: CPT

## 2025-06-14 PROCEDURE — 70553 MRI BRAIN STEM W/O & W/DYE: CPT

## 2025-06-14 PROCEDURE — 2550000001 HC RX 255 CONTRASTS

## 2025-06-14 PROCEDURE — A9575 INJ GADOTERATE MEGLUMI 0.1ML: HCPCS

## 2025-06-14 PROCEDURE — 70553 MRI BRAIN STEM W/O & W/DYE: CPT | Performed by: RADIOLOGY

## 2025-06-14 PROCEDURE — 72156 MRI NECK SPINE W/O & W/DYE: CPT | Performed by: RADIOLOGY

## 2025-06-14 RX ORDER — GADOTERATE MEGLUMINE 376.9 MG/ML
0.2 INJECTION INTRAVENOUS
Status: COMPLETED | OUTPATIENT
Start: 2025-06-14 | End: 2025-06-14

## 2025-06-14 RX ADMIN — GADOTERATE MEGLUMINE 15 ML: 376.9 INJECTION INTRAVENOUS at 10:55

## 2025-06-16 ENCOUNTER — APPOINTMENT (OUTPATIENT)
Dept: RADIOLOGY | Facility: HOSPITAL | Age: 40
End: 2025-06-16
Payer: COMMERCIAL

## 2025-06-20 ENCOUNTER — TELEPHONE (OUTPATIENT)
Dept: NEUROLOGY | Facility: HOSPITAL | Age: 40
End: 2025-06-20
Payer: COMMERCIAL

## 2025-06-20 ENCOUNTER — APPOINTMENT (OUTPATIENT)
Dept: PAIN MEDICINE | Facility: CLINIC | Age: 40
End: 2025-06-20
Payer: COMMERCIAL

## 2025-06-20 NOTE — TELEPHONE ENCOUNTER
Result Communication    Resulted Orders   MR brain w and wo IV contrast    Narrative    Interpreted By:  Diana Meyer,   STUDY:  MR BRAIN W AND WO IV CONTRAST;  6/14/2025 11:48 am      INDICATION:  Signs/Symptoms:Evaluation with white matter lesions with concern for  demyelinating disease.      ,R20.0 Anesthesia of skin,R29.2 Abnormal reflex      COMPARISON:  None.      ACCESSION NUMBER(S):  [id number]      ORDERING CLINICIAN:  CLYDE WHITFIELD      TECHNIQUE:  Axial T2, FLAIR, DWI, gradient echo T2 and sagittal and coronal T1  weighted images of brain were acquired. Post contrast T1 weighted  images were acquired after administration of 15 ML Dotarem gadolinium  based intravenous contrast.      FINDINGS:  CSF Spaces: The ventricles, sulci and basal cisterns are within  normal limits. There is no extra-axial fluid collection.      Parenchyma: There is no diffusion restriction abnormality to suggest  acute infarct.  There is no significant focal parenchymal signal  abnormality.  There is no mass effect or midline shift. Cerebellar  tonsils are above the foramen magnum. Pituitary and sella are not  enlarged. There is no thinning of the corpus callosum. No abnormal  susceptibility artifact. No abnormal parenchymal enhancement.      Paranasal Sinuses and Mastoids: Visualized paranasal sinuses and  mastoid air cells are predominantly clear. Major intracranial flow  voids at the skull base are unremarkable.        Impression    No evidence of acute infarct, intracranial mass effect or midline  shift.      No significant focal parenchymal signal abnormality. No abnormal  parenchymal enhancement.      MACRO:  None      Signed by: Diana Meyer 6/16/2025 10:25 AM  Dictation workstation:   [id number]       3:10 PM  MRI Brain and C-spine reviewed, no white matter lesions identified concerning for multiple sclerosis. Lab work for neuropathy and myopathy unremarkable aside from mildly elevated B6, recommended patient limit  dietary supplementation of B6.    Results were successfully communicated with the patient and they acknowledged their understanding.    Patient has follow up appointment 7/28/25.    Ochoa Lainez MD  Neurology, PGY-3

## 2025-06-25 ENCOUNTER — APPOINTMENT (OUTPATIENT)
Dept: PAIN MEDICINE | Facility: CLINIC | Age: 40
End: 2025-06-25
Payer: COMMERCIAL

## 2025-06-25 VITALS — DIASTOLIC BLOOD PRESSURE: 60 MMHG | HEART RATE: 69 BPM | OXYGEN SATURATION: 98 % | SYSTOLIC BLOOD PRESSURE: 110 MMHG

## 2025-06-25 DIAGNOSIS — M54.41 CHRONIC BILATERAL LOW BACK PAIN WITH BILATERAL SCIATICA: ICD-10-CM

## 2025-06-25 DIAGNOSIS — G89.29 CHRONIC BILATERAL LOW BACK PAIN WITH BILATERAL SCIATICA: ICD-10-CM

## 2025-06-25 DIAGNOSIS — M54.42 CHRONIC BILATERAL LOW BACK PAIN WITH BILATERAL SCIATICA: ICD-10-CM

## 2025-06-25 DIAGNOSIS — M54.16 LUMBAR RADICULOPATHY: Primary | ICD-10-CM

## 2025-06-25 PROCEDURE — 99214 OFFICE O/P EST MOD 30 MIN: CPT | Performed by: ANESTHESIOLOGY

## 2025-06-25 PROCEDURE — 3078F DIAST BP <80 MM HG: CPT | Performed by: ANESTHESIOLOGY

## 2025-06-25 PROCEDURE — 3074F SYST BP LT 130 MM HG: CPT | Performed by: ANESTHESIOLOGY

## 2025-06-25 RX ORDER — DULOXETIN HYDROCHLORIDE 30 MG/1
CAPSULE, DELAYED RELEASE ORAL
Qty: 30 CAPSULE | Refills: 0 | Status: SHIPPED | OUTPATIENT
Start: 2025-06-25

## 2025-06-25 ASSESSMENT — ENCOUNTER SYMPTOMS
CONSTITUTIONAL NEGATIVE: 1
ENDOCRINE NEGATIVE: 1
RESPIRATORY NEGATIVE: 1
LOSS OF SENSATION IN FEET: 0
HEMATOLOGIC/LYMPHATIC NEGATIVE: 1
NUMBNESS: 1
BACK PAIN: 1
PSYCHIATRIC NEGATIVE: 1
DEPRESSION: 0
CARDIOVASCULAR NEGATIVE: 1
WEAKNESS: 1
OCCASIONAL FEELINGS OF UNSTEADINESS: 0
EYES NEGATIVE: 1
GASTROINTESTINAL NEGATIVE: 1

## 2025-06-25 ASSESSMENT — PAIN DESCRIPTION - DESCRIPTORS: DESCRIPTORS: SHARP

## 2025-06-25 ASSESSMENT — PAIN - FUNCTIONAL ASSESSMENT: PAIN_FUNCTIONAL_ASSESSMENT: 0-10

## 2025-06-25 ASSESSMENT — PAIN SCALES - GENERAL
PAINLEVEL_OUTOF10: 5
PAINLEVEL_OUTOF10: 5 - MODERATE PAIN

## 2025-06-25 ASSESSMENT — PATIENT HEALTH QUESTIONNAIRE - PHQ9
SUM OF ALL RESPONSES TO PHQ9 QUESTIONS 1 AND 2: 0
1. LITTLE INTEREST OR PLEASURE IN DOING THINGS: NOT AT ALL
2. FEELING DOWN, DEPRESSED OR HOPELESS: NOT AT ALL

## 2025-06-25 NOTE — PROGRESS NOTES
PAIN MANAGEMENT FOLLOW-UP OFFICE NOTE    Date of Service: 6/25/2025    SUBJECTIVE    CHIEF COMPLAINT: LBP    HISTORY OF PRESENT ILLNESS    Dot Breen is a 39 y.o. female with PMH HTN, BPD, ADHD, PTSD, IBS, GERD/PUD, gastric bypass 2021, former smoker who presents for follow-up    Since her last visit, loss of insurance interrupted her care. Has since done >6 w PT w/o sustained relief. Claims LBP has persisted with radiating pain down BLE worse with bending. Minimal relief with tizanidine. Notes Lyrica improved BL foot pain, but notes forgetfulness that is currently tolerable    Pt denies new-onset bowel/bladder incontinence.  Pt denies recent infection, allergy to Latex/iodine/contrast. Patient is currently taking the following blood thinner(s): N/A    REVIEW OF SYSTEMS  Review of Systems   Constitutional: Negative.    HENT: Negative.     Eyes: Negative.    Respiratory: Negative.     Cardiovascular: Negative.    Gastrointestinal: Negative.    Endocrine: Negative.    Musculoskeletal:  Positive for back pain.   Skin: Negative.    Neurological:  Positive for weakness and numbness.   Hematological: Negative.    Psychiatric/Behavioral: Negative.         PAST MEDICAL HISTORY  Past Medical History:   Diagnosis Date    HTN (hypertension)     Other specified diabetes mellitus without complications     Diabetes mellitus of other type without complication    Personal history of other diseases of the circulatory system     History of hypertension    Personal history of other mental and behavioral disorders 07/18/2017    History of anxiety disorder    Unspecified asthma, uncomplicated ([facility]) 01/04/2017    Asthmatic bronchitis     Past Surgical History:   Procedure Laterality Date    CHOLECYSTECTOMY  2023    HYSTERECTOMY  2023    OTHER SURGICAL HISTORY  06/09/2021    Gastric bypass surgery    OTHER SURGICAL HISTORY  03/2024    OTHER SURGICAL HISTORY      LAPAROSCOPIC LYSIS OF ADHESIONS/ ESOPHAGOGASTRODUODENOSCOPY   (PATY)     Family History   Problem Relation Name Age of Onset    Heart disease Mother  59    Other (CA of lung) Father      No Known Problems Brother      No Known Problems Daughter      No Known Problems Son         CURRENT MEDICATIONS  Current Outpatient Medications   Medication Sig Dispense Refill    acetaminophen (Tylenol) 500 mg tablet Take 2 tablets (1,000 mg) by mouth every 6 hours if needed for mild pain (1 - 3).      Al hyd-Mg tr-alg ac-sod bicarb (Gaviscon) 80-14.2 mg tablet,chewable Chew 2 tablets 3 times a day. 180 tablet 11    amLODIPine (Norvasc) 5 mg tablet Take 1 tablet (5 mg) by mouth once daily. 90 tablet 3    bisoprolol (Zebeta) 5 mg tablet TAKE 1 TABLET BY MOUTH ONCE DAILY 90 tablet 3    levocetirizine (Xyzal) 5 mg tablet Take 1 tablet (5 mg) by mouth once daily at bedtime.      multivit-minerals/folic acid (WOMEN'S MULTIVITAMIN GUMMIES ORAL) Take by mouth.      omeprazole (PriLOSEC) 40 mg DR capsule Take 1 capsule (40 mg) by mouth once daily. 30 capsule 0    polyethylene glycol (Glycolax, Miralax) 17 gram packet Take 17 g by mouth once daily.      pregabalin (Lyrica) 50 mg capsule Take 1 capsule (50 mg) by mouth 2 times a day. 60 capsule 0    cyclobenzaprine (Flexeril) 10 mg tablet Take 1 tablet (10 mg) by mouth 2 times a day as needed for muscle spasms for up to 10 days. 20 tablet 0    LORazepam (Ativan) 1 mg tablet Take 1 tablet (1 mg) by mouth 1 time if needed for anxiety for up to 1 dose. 1 tablet 0     No current facility-administered medications for this visit.       ALLERGIES AND DRUG REACTIONS  Allergies   Allergen Reactions    Vilazodone Rash    Trazodone Rash    Xifaxan [Rifaximin] Rash          OBJECTIVE  Visit Vitals  /60   Pulse 69   SpO2 98%   OB Status Hysterectomy   Smoking Status Former       Last Recorded Pain Score (if available):           Pain Score:   5     Physical Exam  Vitals and nursing note reviewed.       General: Sitting in chair, NAD  Head: NCAT  Eyes:  Sclera/conjunctiva clear, EOMI, PERRL  Nose/mouth: MMM  CV: Good distal pulses  Lungs: Good/equal chest excursion  Abdomen: Soft, ND  Ext: No cyanosis/edema  MSK: L-spine alignment: unremarkable, BL paraspinal m NTTP, L-spine ROM: extension/flexion lmtd by pain    Neuro: AAOx3   Dermatome sensation to light touch  LEFT L1 (lower pelvis/upper thigh): WNL    RIGHT L1: WNL      LEFT L2 (upper thigh): WNL       RIGHT: L2:WNL      LEFT L3 (medial knee): WNL       RIGHT L3: WNL      LEFT L4 (superior medial malleolus): WNL       RIGHT L4: WNL      LEFT L5 (dorsal foot): WNL       RIGHT L5: WNL      LEFT S1 (lateral foot): WNL     RIGHT S1: WNL      LEFT S2 (popliteal fossa): WNL    RIGHT S2: WNL        Motor strength  LEFT L2 (hip flexion): 5/5   RIGHT L2: 5/5  LEFT L3 (knee extension): 5/5     RIGHT L3: 5/5  LEFT L4 (dorsiflexion): 5/5     RIGHT L4: 5/5  LEFT L5 (EHL extension): 5/5     RIGHT L5: 5/5  LEFT S1 (plantarflexion): 5/5     RIGHT S1: 5/5  LEFT S2 (knee flexion): 5/5      RIGHT S2: 5/5    Special testing  DTR unremarkable  Seated slump test +BLE    Psych: affect nl  Skin: no rash/lesions      REVIEW OF LABORATORY DATA  I have reviewed the following lab results:  WBC   Date Value Ref Range Status   02/13/2025 9.3 4.4 - 11.3 x10*3/uL Final     RBC   Date Value Ref Range Status   02/13/2025 4.59 4.00 - 5.20 x10*6/uL Final     Hemoglobin   Date Value Ref Range Status   02/13/2025 14.2 12.0 - 16.0 g/dL Final     Hematocrit   Date Value Ref Range Status   02/13/2025 41.3 36.0 - 46.0 % Final     MCV   Date Value Ref Range Status   02/13/2025 90 80 - 100 fL Final     MCH   Date Value Ref Range Status   02/13/2025 30.9 26.0 - 34.0 pg Final     MCHC   Date Value Ref Range Status   02/13/2025 34.4 32.0 - 36.0 g/dL Final     RDW   Date Value Ref Range Status   02/13/2025 12.0 11.5 - 14.5 % Final     Platelets   Date Value Ref Range Status   02/13/2025 352 150 - 450 x10*3/uL Final     MPV   Date Value Ref Range Status    04/11/2023 9.6 7.0 - 12.6 CU Final     Sodium   Date Value Ref Range Status   02/13/2025 139 136 - 145 mmol/L Final     Potassium   Date Value Ref Range Status   02/13/2025 4.1 3.5 - 5.3 mmol/L Final     Bicarbonate   Date Value Ref Range Status   02/13/2025 28 21 - 32 mmol/L Final     Urea Nitrogen   Date Value Ref Range Status   02/13/2025 19 6 - 23 mg/dL Final     Calcium   Date Value Ref Range Status   02/13/2025 9.0 8.6 - 10.3 mg/dL Final     Protime   Date Value Ref Range Status   08/06/2020 9.8 9.3 - 12.7 SEC Final     INR   Date Value Ref Range Status   08/06/2020 0.9 0.86 - 1.16 Final     Comment:     LESS THAN   INR Therapeutic Range: 2.0-3.5           REVIEW OF RADIOLOGY   I have reviewed the following:  Radiology Studies           XR L-spine 1/9/25:  Mild lumbar degenerative change with a minimal scoliosis        ASSESSMENT & PLAN  Dot Breen is a 39 y.o. female with PMH HTN, BPD, ADHD, PTSD, IBS, GERD/PUD, gastric bypass 2021, former smoker who presents for follow-up    1) LBP  -Since 2020 worsened since 7/2024 radiating down BLE subj numbness/weakness w/o obj deficit, +seated slump BL  -Refractive to yrs of conservative tx including Tylenol, Icy-hot patches, gabapentin, rest, back brace, >6 w PT, tizanidine. No NSAIDs, oral steroids 2/2 bypass  -MRI L-spine to eval neuraxial dz  -Cont Lyrica 50 mg BID  -Duloxetine 60 mg every day titration  -F/U 4-6 w          Today's visit involved establishment of care and a complete examination with review of records. In the context of the complexity of this patient's chronic pain diagnosis, long-term expectations and care planning discussed. Imaging studies ordered are placed do elucidate the patient's diagnosis, but also to evaluate the patient's candidacy for procedural and surgical interventions. The risks and benefits of these potential interventions are detailed as above.         José Luis Fierro MD  Anesthesiologist & Interventional Pain Physician  [facility]  O: [phone]  F: [phone]  2:53 PM  06/25/25

## 2025-06-26 ENCOUNTER — TELEPHONE (OUTPATIENT)
Dept: CARDIOLOGY | Facility: CLINIC | Age: 40
End: 2025-06-26
Payer: COMMERCIAL

## 2025-06-26 NOTE — TELEPHONE ENCOUNTER
We need to reschedule your appointment on 07/22/25 with Dr Castaneda he will not be in the office in the morning that week. Please call [phone]. Thank you.

## 2025-06-30 ENCOUNTER — APPOINTMENT (OUTPATIENT)
Dept: RADIOLOGY | Facility: HOSPITAL | Age: 40
End: 2025-06-30
Payer: COMMERCIAL

## 2025-07-01 ENCOUNTER — PATIENT MESSAGE (OUTPATIENT)
Dept: PAIN MEDICINE | Facility: CLINIC | Age: 40
End: 2025-07-01
Payer: COMMERCIAL

## 2025-07-01 DIAGNOSIS — F40.240 CLAUSTROPHOBIA: ICD-10-CM

## 2025-07-01 DIAGNOSIS — M54.16 LUMBAR RADICULOPATHY: ICD-10-CM

## 2025-07-02 RX ORDER — PREGABALIN 50 MG/1
50 CAPSULE ORAL 2 TIMES DAILY
Qty: 60 CAPSULE | Refills: 0 | Status: SHIPPED | OUTPATIENT
Start: 2025-07-02 | End: 2025-08-01

## 2025-07-02 RX ORDER — LORAZEPAM 1 MG/1
1 TABLET ORAL
Qty: 1 TABLET | Refills: 0 | Status: SHIPPED | OUTPATIENT
Start: 2025-07-02 | End: 2025-07-02

## 2025-07-06 ENCOUNTER — HOSPITAL ENCOUNTER (OUTPATIENT)
Dept: RADIOLOGY | Facility: HOSPITAL | Age: 40
Discharge: HOME | End: 2025-07-06
Payer: COMMERCIAL

## 2025-07-06 DIAGNOSIS — M54.16 LUMBAR RADICULOPATHY: ICD-10-CM

## 2025-07-06 PROCEDURE — 72148 MRI LUMBAR SPINE W/O DYE: CPT | Performed by: RADIOLOGY

## 2025-07-06 PROCEDURE — 72148 MRI LUMBAR SPINE W/O DYE: CPT

## 2025-07-08 ENCOUNTER — OFFICE VISIT (OUTPATIENT)
Dept: PRIMARY CARE | Facility: CLINIC | Age: 40
End: 2025-07-08
Payer: COMMERCIAL

## 2025-07-08 VITALS
SYSTOLIC BLOOD PRESSURE: 118 MMHG | WEIGHT: 165 LBS | HEART RATE: 68 BPM | OXYGEN SATURATION: 99 % | DIASTOLIC BLOOD PRESSURE: 64 MMHG | BODY MASS INDEX: 29.23 KG/M2

## 2025-07-08 DIAGNOSIS — I10 PRIMARY HYPERTENSION: ICD-10-CM

## 2025-07-08 DIAGNOSIS — R53.83 OTHER FATIGUE: ICD-10-CM

## 2025-07-08 DIAGNOSIS — L65.9 HAIR LOSS: ICD-10-CM

## 2025-07-08 DIAGNOSIS — T14.8XXA BRUISING: ICD-10-CM

## 2025-07-08 DIAGNOSIS — Z00.00 ROUTINE MEDICAL EXAM: Primary | ICD-10-CM

## 2025-07-08 DIAGNOSIS — E53.8 B12 DEFICIENCY: ICD-10-CM

## 2025-07-08 DIAGNOSIS — E78.5 HYPERLIPIDEMIA, UNSPECIFIED HYPERLIPIDEMIA TYPE: ICD-10-CM

## 2025-07-08 DIAGNOSIS — D50.8 OTHER IRON DEFICIENCY ANEMIA: ICD-10-CM

## 2025-07-08 DIAGNOSIS — E03.8 TSH (THYROID-STIMULATING HORMONE DEFICIENCY): ICD-10-CM

## 2025-07-08 RX ORDER — CYANOCOBALAMIN 1000 UG/ML
1000 INJECTION, SOLUTION INTRAMUSCULAR; SUBCUTANEOUS
Status: SHIPPED | OUTPATIENT
Start: 2025-08-08 | End: 2025-11-06

## 2025-07-08 RX ORDER — CYANOCOBALAMIN 1000 UG/ML
1000 INJECTION, SOLUTION INTRAMUSCULAR; SUBCUTANEOUS ONCE
Status: COMPLETED | OUTPATIENT
Start: 2025-07-08 | End: 2025-07-08

## 2025-07-08 RX ADMIN — CYANOCOBALAMIN 1000 MCG: 1000 INJECTION, SOLUTION INTRAMUSCULAR; SUBCUTANEOUS at 08:40

## 2025-07-08 ASSESSMENT — ENCOUNTER SYMPTOMS
LOSS OF SENSATION IN FEET: 0
WEAKNESS: 1
MYALGIAS: 1
FATIGUE: 1
DEPRESSION: 0
OCCASIONAL FEELINGS OF UNSTEADINESS: 0
ABDOMINAL DISTENTION: 1

## 2025-07-08 ASSESSMENT — PAIN SCALES - GENERAL: PAINLEVEL_OUTOF10: 7

## 2025-07-08 NOTE — PROGRESS NOTES
Subjective   Patient ID: Dot Breen is a 39 y.o. female who presents for health concerns (Patient here to discuss health concerns. She's noticed hair/nails thining, and low energy. She's taking 3 naps a day).    HPI   Fatigue, hair loss , hair falling out , nail breakage, hormone imbalance using gummies for supplements due hx gastric bypass, hysterectomy no sleep apnea, having daytime sleepiness   Review of Systems   Constitutional:  Positive for fatigue.   Gastrointestinal:  Positive for abdominal distention.   Musculoskeletal:  Positive for myalgias.   Neurological:  Positive for weakness.       Objective   /64 (BP Location: Left arm, Patient Position: Sitting, BP Cuff Size: Adult)   Pulse 68   Wt 74.8 kg (165 lb)   SpO2 99%   BMI 29.23 kg/m²     Physical Exam  Constitutional:       General: She is not in acute distress.     Appearance: Normal appearance.   Cardiovascular:      Rate and Rhythm: Normal rate and regular rhythm.      Heart sounds: No murmur heard.  Pulmonary:      Breath sounds: Normal breath sounds. No wheezing.   Skin:     General: Skin is warm.   Neurological:      General: No focal deficit present.      Mental Status: She is alert.         Assessment/Plan   Problem List Items Addressed This Visit           ICD-10-CM    Fatigue R53.83    Relevant Orders    Cortisol    FSH & LH    Hyperlipidemia E78.5    Relevant Orders    CBC and Auto Differential     Other Visit Diagnoses         Codes      Hair loss    -  Primary L65.9    Relevant Orders    Cortisol    FSH & LH      Bruising     T14.8XXA    Relevant Orders    Ferritin      Routine medical exam     Z00.00    Relevant Orders    CBC and Auto Differential      Primary hypertension     I10    Relevant Orders    CBC and Auto Differential      Other iron deficiency anemia     D50.8    Relevant Orders    Iron and TIBC    Ferritin      B12 deficiency     E53.8    Relevant Medications    cyanocobalamin (Vitamin B-12) injection 1,000 mcg  (Completed)    Other Relevant Orders    Vitamin B12      TSH (thyroid-stimulating hormone deficiency)     E03.8

## 2025-07-09 LAB
BASOPHILS # BLD AUTO: 59 CELLS/UL (ref 0–200)
BASOPHILS NFR BLD AUTO: 1 %
CORTIS SERPL-MCNC: 12.8 MCG/DL
EOSINOPHIL # BLD AUTO: 183 CELLS/UL (ref 15–500)
EOSINOPHIL NFR BLD AUTO: 3.1 %
ERYTHROCYTE [DISTWIDTH] IN BLOOD BY AUTOMATED COUNT: 13 % (ref 11–15)
FERRITIN SERPL-MCNC: 10 NG/ML (ref 16–232)
FSH SERPL-ACNC: 5.5 MIU/ML
HCT VFR BLD AUTO: 41.1 % (ref 35–45)
HGB BLD-MCNC: 13.4 G/DL (ref 11.7–15.5)
IRON SATN MFR SERPL: 25 % (CALC) (ref 16–45)
IRON SERPL-MCNC: 99 MCG/DL (ref 40–190)
LH SERPL-ACNC: 6.6 MIU/ML
LYMPHOCYTES # BLD AUTO: 2142 CELLS/UL (ref 850–3900)
LYMPHOCYTES NFR BLD AUTO: 36.3 %
MCH RBC QN AUTO: 30.3 PG (ref 27–33)
MCHC RBC AUTO-ENTMCNC: 32.6 G/DL (ref 32–36)
MCV RBC AUTO: 93 FL (ref 80–100)
MONOCYTES # BLD AUTO: 596 CELLS/UL (ref 200–950)
MONOCYTES NFR BLD AUTO: 10.1 %
NEUTROPHILS # BLD AUTO: 2921 CELLS/UL (ref 1500–7800)
NEUTROPHILS NFR BLD AUTO: 49.5 %
PLATELET # BLD AUTO: 310 THOUSAND/UL (ref 140–400)
PMV BLD REES-ECKER: 10.2 FL (ref 7.5–12.5)
RBC # BLD AUTO: 4.42 MILLION/UL (ref 3.8–5.1)
TIBC SERPL-MCNC: 400 MCG/DL (CALC) (ref 250–450)
VIT B12 SERPL-MCNC: 296 PG/ML (ref 200–1100)
WBC # BLD AUTO: 5.9 THOUSAND/UL (ref 3.8–10.8)

## 2025-07-22 ENCOUNTER — APPOINTMENT (OUTPATIENT)
Dept: CARDIOLOGY | Facility: CLINIC | Age: 40
End: 2025-07-22
Payer: COMMERCIAL

## 2025-07-23 ENCOUNTER — APPOINTMENT (OUTPATIENT)
Dept: NEUROLOGY | Facility: HOSPITAL | Age: 40
End: 2025-07-23
Payer: COMMERCIAL

## 2025-07-28 ENCOUNTER — APPOINTMENT (OUTPATIENT)
Dept: PAIN MEDICINE | Facility: CLINIC | Age: 40
End: 2025-07-28
Payer: COMMERCIAL

## 2025-08-01 DIAGNOSIS — M54.16 LUMBAR RADICULOPATHY: ICD-10-CM

## 2025-08-01 RX ORDER — DULOXETIN HYDROCHLORIDE 30 MG/1
CAPSULE, DELAYED RELEASE ORAL
Qty: 30 CAPSULE | Refills: 0 | Status: CANCELLED | OUTPATIENT
Start: 2025-08-01

## 2025-08-07 DIAGNOSIS — M54.16 LUMBAR RADICULOPATHY: ICD-10-CM

## 2025-08-09 RX ORDER — PREGABALIN 50 MG/1
50 CAPSULE ORAL 2 TIMES DAILY
Qty: 60 CAPSULE | Refills: 0 | Status: SHIPPED | OUTPATIENT
Start: 2025-08-09

## 2025-08-20 ENCOUNTER — APPOINTMENT (OUTPATIENT)
Dept: PAIN MEDICINE | Facility: CLINIC | Age: 40
End: 2025-08-20
Payer: COMMERCIAL

## 2025-08-25 ENCOUNTER — APPOINTMENT (OUTPATIENT)
Facility: CLINIC | Age: 40
End: 2025-08-25
Payer: COMMERCIAL

## 2025-08-25 VITALS
WEIGHT: 170 LBS | HEART RATE: 76 BPM | DIASTOLIC BLOOD PRESSURE: 70 MMHG | OXYGEN SATURATION: 98 % | BODY MASS INDEX: 30.11 KG/M2 | SYSTOLIC BLOOD PRESSURE: 118 MMHG

## 2025-08-25 DIAGNOSIS — E78.2 MIXED HYPERLIPIDEMIA: ICD-10-CM

## 2025-08-25 DIAGNOSIS — R00.2 PALPITATIONS: Primary | ICD-10-CM

## 2025-08-25 DIAGNOSIS — I10 DIASTOLIC HYPERTENSION: ICD-10-CM

## 2025-08-25 PROCEDURE — 99212 OFFICE O/P EST SF 10 MIN: CPT

## 2025-08-25 PROCEDURE — 3078F DIAST BP <80 MM HG: CPT | Performed by: INTERNAL MEDICINE

## 2025-08-25 PROCEDURE — 99213 OFFICE O/P EST LOW 20 MIN: CPT | Performed by: INTERNAL MEDICINE

## 2025-08-25 PROCEDURE — 3074F SYST BP LT 130 MM HG: CPT | Performed by: INTERNAL MEDICINE

## 2025-08-25 ASSESSMENT — ENCOUNTER SYMPTOMS
OCCASIONAL FEELINGS OF UNSTEADINESS: 0
DEPRESSION: 0
LOSS OF SENSATION IN FEET: 1

## 2025-08-25 ASSESSMENT — PAIN SCALES - GENERAL: PAINLEVEL_OUTOF10: 0-NO PAIN

## 2025-08-25 ASSESSMENT — LIFESTYLE VARIABLES: TOTAL SCORE: 0

## 2025-09-26 ENCOUNTER — APPOINTMENT (OUTPATIENT)
Dept: PAIN MEDICINE | Facility: CLINIC | Age: 40
End: 2025-09-26
Payer: COMMERCIAL

## 2025-12-10 ENCOUNTER — APPOINTMENT (OUTPATIENT)
Dept: NEUROLOGY | Facility: HOSPITAL | Age: 40
End: 2025-12-10
Payer: COMMERCIAL

## (undated) DEVICE — SUTURE, VICRYL, 0, 27 IN, UR-6, VIOLET

## (undated) DEVICE — SOLUTION, IRRIGATION, X RX SODIUM CHL 0.9%, 1000ML BTL

## (undated) DEVICE — SPONGE, HEMOSTATIC, CELLULOSE, SURGICEL, 4 X 8 IN

## (undated) DEVICE — SLEEVE, KII, Z-THREAD, 5X100CM

## (undated) DEVICE — GLOVE, SURGICAL, PROTEXIS PI BLUE W/NEUTHERA, 6.5, PF, LF

## (undated) DEVICE — ENDO, TROCAR 12 X 100 BLADELESS, W / STABILITY SLEEVE

## (undated) DEVICE — Device

## (undated) DEVICE — TUBE SET, PNEUMOLAR HEATED, SMOKE EVACU, HIGH-FLOW

## (undated) DEVICE — ENDOPATH, XCEL, 5MM X 100MM, FOR BLADELESS TROCAR

## (undated) DEVICE — IRRIGATOR, WOUND, HYDRO SURG PLUS, W/O TIP, DISP

## (undated) DEVICE — SCISSORS, HOOK, 5MM X 31CM, DISP

## (undated) DEVICE — GLOVE, SURGICAL, PROTEXIS PI , 6.5, PF, LF

## (undated) DEVICE — WATER STERILE, FOR IRRIGATION, 1000ML, W/HANGER

## (undated) DEVICE — SHEARS, HARMONIC 5 X 36 CVD ACE+7

## (undated) DEVICE — GLOVE, SURGICAL, PROTEXIS PI , 7.0, PF, LF